# Patient Record
Sex: FEMALE | Race: WHITE | NOT HISPANIC OR LATINO | Employment: OTHER | URBAN - METROPOLITAN AREA
[De-identification: names, ages, dates, MRNs, and addresses within clinical notes are randomized per-mention and may not be internally consistent; named-entity substitution may affect disease eponyms.]

---

## 2023-03-26 ENCOUNTER — APPOINTMENT (OUTPATIENT)
Dept: GENERAL RADIOLOGY | Facility: HOSPITAL | Age: 68
End: 2023-03-26
Payer: MEDICARE

## 2023-03-26 ENCOUNTER — HOSPITAL ENCOUNTER (EMERGENCY)
Facility: HOSPITAL | Age: 68
Discharge: HOME | End: 2023-03-26
Admitting: EMERGENCY MEDICINE
Payer: MEDICARE

## 2023-03-26 VITALS
SYSTOLIC BLOOD PRESSURE: 146 MMHG | TEMPERATURE: 100.6 F | HEART RATE: 76 BPM | BODY MASS INDEX: 32.28 KG/M2 | OXYGEN SATURATION: 97 % | WEIGHT: 200.84 LBS | RESPIRATION RATE: 13 BRPM | DIASTOLIC BLOOD PRESSURE: 62 MMHG | HEIGHT: 66 IN

## 2023-03-26 DIAGNOSIS — M25.471 ANKLE SWELLING, RIGHT: Primary | ICD-10-CM

## 2023-03-26 DIAGNOSIS — M77.31 CALCANEAL SPUR OF FOOT, RIGHT: ICD-10-CM

## 2023-03-26 PROCEDURE — 99283 EMERGENCY DEPT VISIT LOW MDM: CPT

## 2023-03-26 PROCEDURE — 73630 X-RAY EXAM OF FOOT: CPT

## 2023-03-26 RX ORDER — IBUPROFEN 400 MG/1
800 TABLET ORAL ONCE
Status: COMPLETED | OUTPATIENT
Start: 2023-03-26 | End: 2023-03-26

## 2023-03-26 RX ADMIN — IBUPROFEN 800 MG: 400 TABLET ORAL at 17:12

## 2023-03-26 NOTE — ED PROVIDER NOTES
Time: 5:39 PM EDT  Date of encounter:  3/26/2023  Independent Historian/Clinical History and Information was obtained by:   Patient  Chief Complaint   Patient presents with   • Foot Swelling   • Foot Pain       History is limited by: N/A    History of Present Illness:  Patient is a 67 y.o. year old female who presents to the emergency department for evaluation of R foot pain, swelling since Tuesday.  Patient states she was moving and dropped a box on her right foot.  Patient states that she has a cold right now with symptoms of nasal congestion and loss of taste or smell.  She denies recent travel, anticoagulants, cough, shortness of breath or history of DVT.  Denies fever, chills, nausea or vomiting.    HPI    Patient Care Team  Primary Care Provider: Provider, No Known    Past Medical History:     No Known Allergies  Past Medical History:   Diagnosis Date   • Arthritis      Past Surgical History:   Procedure Laterality Date   • HIP BIPOLAR REPLACEMENT       History reviewed. No pertinent family history.    Home Medications:  Prior to Admission medications    Medication Sig Start Date End Date Taking? Authorizing Provider   aspirin 81 MG EC tablet Take 1 tablet by mouth Daily.    Emergency, Nurse Crispin RN   buPROPion XL (WELLBUTRIN XL) 150 MG 24 hr tablet Take 2 tablets by mouth Daily.    Emergency, Nurse Crispin RN   buPROPion XL (WELLBUTRIN XL) 150 MG 24 hr tablet Take 1 tablet by mouth Daily.    Emergency, Nurse Crispin RN   FLUoxetine (PROzac) 20 MG capsule Take 1 capsule by mouth Daily.    Emergency, Nurse MIGUEL Roa        Social History:   Social History     Tobacco Use   • Smoking status: Never     Passive exposure: Never   • Smokeless tobacco: Never   Vaping Use   • Vaping Use: Never used   Substance Use Topics   • Alcohol use: Not Currently   • Drug use: Never         Review of Systems:  Review of Systems   Constitutional: Negative.    HENT: Negative.    Eyes: Negative.    Respiratory: Negative.   "  Cardiovascular: Negative.    Gastrointestinal: Negative.    Endocrine: Negative.    Genitourinary: Negative.    Musculoskeletal: Positive for arthralgias and joint swelling.   Skin: Negative.  Negative for color change and rash.   Allergic/Immunologic: Negative.    Neurological: Negative.    Hematological: Negative.    Psychiatric/Behavioral: Negative.         Physical Exam:  /62 (BP Location: Left arm, Patient Position: Sitting)   Pulse 76   Temp (!) 100.6 °F (38.1 °C) (Oral)   Resp 13   Ht 167.6 cm (66\")   Wt 91.1 kg (200 lb 13.4 oz)   SpO2 97%   BMI 32.42 kg/m²     Physical Exam  Vitals and nursing note reviewed.   Constitutional:       General: She is not in acute distress.     Appearance: Normal appearance. She is normal weight. She is not ill-appearing, toxic-appearing or diaphoretic.   HENT:      Head: Normocephalic and atraumatic.      Nose: Nose normal.      Mouth/Throat:      Mouth: Mucous membranes are moist.   Eyes:      Extraocular Movements: Extraocular movements intact.      Pupils: Pupils are equal, round, and reactive to light.   Cardiovascular:      Rate and Rhythm: Normal rate and regular rhythm.      Heart sounds: Normal heart sounds.   Pulmonary:      Effort: Pulmonary effort is normal.      Breath sounds: Normal breath sounds.   Musculoskeletal:         General: Swelling, tenderness and signs of injury present.      Cervical back: Normal range of motion and neck supple.      Right foot: Decreased range of motion. Normal capillary refill. Swelling and tenderness present. Normal pulse.        Feet:       Comments: Homans sign negative    Skin:     General: Skin is warm and dry.      Capillary Refill: Capillary refill takes 2 to 3 seconds.      Findings: No bruising or erythema.   Neurological:      General: No focal deficit present.      Mental Status: She is alert and oriented to person, place, and time.   Psychiatric:         Mood and Affect: Mood normal.         Behavior: " Behavior normal.                  Procedures:  Procedures      Medical Decision Making:      Comorbidities that affect care:    None    External Notes reviewed:    None      The following orders were placed and all results were independently analyzed by me:  Orders Placed This Encounter   Procedures   • [facility] 08.  CAM Boot   • XR Foot 3+ View Right   • Ace wrap to R foot.  [facility]       Medications Given in the Emergency Department:  Medications   ibuprofen (ADVIL,MOTRIN) tablet 800 mg (800 mg Oral Given 3/26/23 1712)        ED Course:    The patient was initially evaluated in the triage area where orders were placed. The patient was later dispositioned by Jerica Huerta PA-C.      The patient was advised to stay for completion of workup which includes but is not limited to communication of labs and radiological results, reassessment and plan. The patient was advised that leaving prior to disposition by a provider could result in critical findings that are not communicated to the patient.     ED Course as of 03/26/23 1825   Sun Mar 26, 2023   1657 Dropped a box on R foot Tuesday. No bloodthinners, no recent travel, denies SOB/cough [AJ]   1742 Patient dec;line swabs for covid or flu and states she's feeling better  [AJ]   1746 There are degenerative changes involving the head of the 1st metatarsal.  There is no acute   fracture.  There is a plantar calcaneal spur.  There is some spurring along the dorsum of the   midfoot.    [AJ]      ED Course User Index  [AJ] Jerica Huerta PA-C       Labs:    Lab Results (last 24 hours)     ** No results found for the last 24 hours. **           Imaging:    XR Foot 3+ View Right    Result Date: 3/26/2023  PROCEDURE: XR FOOT 3+ VW RIGHT  COMPARISON: None  INDICATIONS: DROPPED BOX OF BOOKS ON RIGHT FOOT Wednesday COMPLAINS OF PAIN  FINDINGS:  There are degenerative changes involving the head of the 1st metatarsal.  There is no acute  fracture.  There is a plantar calcaneal spur.  There is some spurring along the dorsum of the midfoot.        1. Evidence for underlying degenerative change. 2. Plantar calcaneal spur.      ANGELO HERNANDEZ MD       Electronically Signed and Approved By: ANGELO HERNANDEZ MD on 3/26/2023 at 17:18                 Differential Diagnosis and Discussion:      Extremity Pain: Differential diagnosis includes but is not limited to soft tissue sprain, tendonitis, tendon injury, dislocation, fracture, deep vein thrombosis, arterial insufficiency, osteoarthritis, bursitis, and ligamentous damage.  Joint Pain: Differential diagnosis includes but is not limited to polyarticular arthritis, gout, tendinitis, hemarthrosis, septic arthritis, rheumatoid arthritis, bursitis, degenerative joint disease, joint effusion, autoimmune disorder, trauma, and occult neoplasm.    All X-rays were independently reviewed by me.    MDM     Patient Care Considerations:    DUPLEX ULTRASOUND: I considered ordering a duplex ultrasound, however the patient is low risk for dvt and has no signs of arterial occlusion.      Consultants/Shared Management Plan:    None    Social Determinants of Health:    Patient is independent, reliable, and has access to care.       Disposition and Care Coordination:    Discharged: The patient is suitable and stable for discharge with no need for consideration of observation or admission.      I have explained the patient´s condition, diagnoses and treatment plan based on the information available to me at this time. I have answered questions and addressed any concerns. The patient has a good  understanding of the patient´s diagnosis, condition, and treatment plan as can be expected at this point. The vital signs have been stable. The patient´s condition is stable and appropriate for discharge from the emergency department.      The patient will pursue further outpatient evaluation with the primary care physician or other  designated or consulting physician as outlined in the discharge instructions. They are agreeable to this plan of care and follow-up instructions have been explained in detail. The patient has received these instructions in written format and have expressed an understanding of the discharge instructions. The patient is aware that any significant change in condition or worsening of symptoms should prompt an immediate return to this or the closest emergency department or call to 911.  I have explained discharge medications and the need for follow up with the patient/caretakers. This was also printed in the discharge instructions. Patient was discharged with the following medications and follow up:      Medication List      No changes were made to your prescriptions during this visit.      Provider, No Known  [facility] [id number]  [phone]    In 1 week  If symptoms worsen       Final diagnoses:   Ankle swelling, right (lateral malleolus)   Calcaneal spur of foot, right        ED Disposition     ED Disposition   Discharge    Condition   Stable    Comment   --             This medical record created using voice recognition software.           Jerica Huerta PA-C  03/26/23 2120

## 2023-03-26 NOTE — DISCHARGE INSTRUCTIONS
Please wear Ace wrap, ice the right ankle and top of the foot for 15 to 20 minutes multiple times throughout the day and elevate  Please follow up with your PCP  If any worsening swelling, redness or worsening pain please return to the ED

## 2023-05-04 ENCOUNTER — OFFICE VISIT (OUTPATIENT)
Dept: FAMILY MEDICINE CLINIC | Facility: CLINIC | Age: 68
End: 2023-05-04
Payer: MEDICARE

## 2023-05-04 VITALS
DIASTOLIC BLOOD PRESSURE: 68 MMHG | WEIGHT: 203.5 LBS | HEIGHT: 66 IN | SYSTOLIC BLOOD PRESSURE: 139 MMHG | BODY MASS INDEX: 32.71 KG/M2 | TEMPERATURE: 98.4 F | HEART RATE: 97 BPM | OXYGEN SATURATION: 97 %

## 2023-05-04 DIAGNOSIS — S46.012A TRAUMATIC TEAR OF LEFT ROTATOR CUFF, UNSPECIFIED TEAR EXTENT, INITIAL ENCOUNTER: ICD-10-CM

## 2023-05-04 DIAGNOSIS — I25.10 CORONARY ARTERY DISEASE INVOLVING NATIVE CORONARY ARTERY OF NATIVE HEART WITHOUT ANGINA PECTORIS: Primary | Chronic | ICD-10-CM

## 2023-05-04 DIAGNOSIS — M10.9 GOUT, UNSPECIFIED CAUSE, UNSPECIFIED CHRONICITY, UNSPECIFIED SITE: ICD-10-CM

## 2023-05-04 DIAGNOSIS — F33.1 MODERATE EPISODE OF RECURRENT MAJOR DEPRESSIVE DISORDER: Chronic | ICD-10-CM

## 2023-05-04 DIAGNOSIS — Z12.11 ENCOUNTER FOR SCREENING FOR MALIGNANT NEOPLASM OF COLON: ICD-10-CM

## 2023-05-04 DIAGNOSIS — I10 PRIMARY HYPERTENSION: Chronic | ICD-10-CM

## 2023-05-04 DIAGNOSIS — E66.09 CLASS 1 OBESITY DUE TO EXCESS CALORIES WITH SERIOUS COMORBIDITY AND BODY MASS INDEX (BMI) OF 32.0 TO 32.9 IN ADULT: Chronic | ICD-10-CM

## 2023-05-04 DIAGNOSIS — E55.9 VITAMIN D DEFICIENCY: Chronic | ICD-10-CM

## 2023-05-04 DIAGNOSIS — Z00.00 ANNUAL PHYSICAL EXAM: ICD-10-CM

## 2023-05-04 DIAGNOSIS — Z78.0 POSTMENOPAUSE: ICD-10-CM

## 2023-05-04 DIAGNOSIS — E78.5 HYPERLIPIDEMIA, UNSPECIFIED HYPERLIPIDEMIA TYPE: ICD-10-CM

## 2023-05-04 DIAGNOSIS — Z12.31 ENCOUNTER FOR SCREENING MAMMOGRAM FOR MALIGNANT NEOPLASM OF BREAST: ICD-10-CM

## 2023-05-04 PROBLEM — E66.811 CLASS 1 OBESITY DUE TO EXCESS CALORIES WITH SERIOUS COMORBIDITY AND BODY MASS INDEX (BMI) OF 32.0 TO 32.9 IN ADULT: Chronic | Status: ACTIVE | Noted: 2023-05-04

## 2023-05-04 RX ORDER — METOPROLOL TARTRATE 50 MG/1
TABLET, FILM COATED ORAL
COMMUNITY
Start: 2023-04-13

## 2023-05-04 RX ORDER — IBUPROFEN 400 MG/1
400 TABLET ORAL EVERY 6 HOURS PRN
COMMUNITY

## 2023-05-04 RX ORDER — FLUOXETINE HYDROCHLORIDE 40 MG/1
CAPSULE ORAL
COMMUNITY
Start: 2023-04-23

## 2023-05-04 NOTE — ASSESSMENT & PLAN NOTE
Patient's (Body mass index is 32.85 kg/m².) indicates that they are obese (BMI >30) with health conditions that include hypertension . Weight is newly identified. BMI  is above average; BMI management plan is completed. We discussed low calorie, low carb based diet program, portion control and increasing exercise.

## 2023-05-04 NOTE — PROGRESS NOTES
"Chief Complaint  Hypertension, Shoulder Pain (Left ), and referral for cardiologist     Subjective        Elysia Babcock presents to [facility]  History of Present Illness  She is here today to establish relations and new patient.  She is  and lives with her son and daughter-in-law.  She spent quite a bit of her adult life in [place].  She was a  and she is now retired.  She has obesity, coronary artery disease status post stent x1 in 2017, major depression, hypertension, hyperlipidemia and vitamin D deficiency. Her dad had cad.      She is wanting to lose wt and has lost 50 lbs.     The patient has no other complaints today and denies chest pain, shortness of breath, weakness, numbness, nausea, vomiting, diarrhea, dizziness or syncopal event.        Objective   Vital Signs:  /68 (Patient Position: Sitting)   Pulse 97   Temp 98.4 °F (36.9 °C)   Ht 167.6 cm (66\")   Wt 92.3 kg (203 lb 8 oz)   SpO2 97%   BMI 32.85 kg/m²   Estimated body mass index is 32.85 kg/m² as calculated from the following:    Height as of this encounter: 167.6 cm (66\").    Weight as of this encounter: 92.3 kg (203 lb 8 oz).             Physical Exam  Vitals reviewed.   Constitutional:       Appearance: Normal appearance. She is well-developed. She is obese.   HENT:      Head: Normocephalic and atraumatic.      Right Ear: External ear normal.      Left Ear: External ear normal.      Mouth/Throat:      Pharynx: No oropharyngeal exudate.   Eyes:      Conjunctiva/sclera: Conjunctivae normal.      Pupils: Pupils are equal, round, and reactive to light.   Neck:      Vascular: No carotid bruit.   Cardiovascular:      Rate and Rhythm: Normal rate and regular rhythm.      Heart sounds: No murmur heard.    No friction rub. No gallop.   Pulmonary:      Effort: Pulmonary effort is normal.      Breath sounds: Normal breath sounds. No wheezing or rhonchi.   Abdominal:      General: There is no " distension.   Skin:     General: Skin is warm and dry.   Neurological:      Mental Status: She is alert and oriented to person, place, and time.      Cranial Nerves: No cranial nerve deficit.      Motor: No weakness.   Psychiatric:         Mood and Affect: Mood and affect normal.         Behavior: Behavior normal.         Thought Content: Thought content normal.         Judgment: Judgment normal.        Result Review :                                 Assessment and Plan   Diagnoses and all orders for this visit:    1. Coronary artery disease involving native coronary artery of native heart without angina pectoris (Primary)  Comments:  She was given an order for a lipid level and a cardilogy referral.   Orders:  -     Ambulatory Referral to Cardiology    2. Encounter for screening mammogram for malignant neoplasm of breast  -     Mammo Screening Digital Tomosynthesis Bilateral With CAD; Future    3. Postmenopause  -     DEXA Bone Density Axial; Future    4. Encounter for screening for malignant neoplasm of colon  -     Ambulatory Referral For Screening Colonoscopy    5. Class 1 obesity due to excess calories with serious comorbidity and body mass index (BMI) of 32.0 to 32.9 in adult  Assessment & Plan:  Patient's (Body mass index is 32.85 kg/m².) indicates that they are obese (BMI >30) with health conditions that include hypertension . Weight is newly identified. BMI  is above average; BMI management plan is completed. We discussed low calorie, low carb based diet program, portion control and increasing exercise.       6. Vitamin D deficiency  -     Vitamin D 25 hydroxy; Future    7. Moderate episode of recurrent major depressive disorder  Assessment & Plan:  Patient's depression is recurrent and is moderate without psychosis. Their depression is currently active and the condition is improving with treatment. This will be reassessed at the next regular appointment. F/U as described:patient will continue current  medication therapy.      8. Primary hypertension  Assessment & Plan:  Hypertension is improving with treatment.  Continue current treatment regimen.  Dietary sodium restriction.  Weight loss.  Blood pressure will be reassessed at the next regular appointment.    Orders:  -     Ambulatory Referral to Cardiology    9. Annual physical exam  -     Comprehensive Metabolic Panel; Future  -     CBC & Differential; Future  -     TSH+Free T4; Future    10. Hyperlipidemia, unspecified hyperlipidemia type  -     Lipid Panel; Future  -     Ambulatory Referral to Cardiology    11. Gout, unspecified cause, unspecified chronicity, unspecified site  -     Uric acid; Future    12. Traumatic tear of left rotator cuff, unspecified tear extent, initial encounter  -     Ambulatory Referral to Orthopedic Surgery           Follow Up   Return in about 6 months (around 11/4/2023).  Patient was given instructions and counseling regarding her condition or for health maintenance advice. Please see specific information pulled into the AVS if appropriate.

## 2023-05-10 ENCOUNTER — OFFICE VISIT (OUTPATIENT)
Dept: ORTHOPEDIC SURGERY | Facility: CLINIC | Age: 68
End: 2023-05-10
Payer: MEDICARE

## 2023-05-10 VITALS — WEIGHT: 208 LBS | BODY MASS INDEX: 33.43 KG/M2 | HEART RATE: 93 BPM | HEIGHT: 66 IN | OXYGEN SATURATION: 95 %

## 2023-05-10 DIAGNOSIS — M25.512 LEFT SHOULDER PAIN, UNSPECIFIED CHRONICITY: Primary | ICD-10-CM

## 2023-05-10 DIAGNOSIS — M12.812 ROTATOR CUFF ARTHROPATHY, LEFT: ICD-10-CM

## 2023-05-10 RX ORDER — LIDOCAINE HYDROCHLORIDE 10 MG/ML
5 INJECTION, SOLUTION EPIDURAL; INFILTRATION; INTRACAUDAL; PERINEURAL
Status: COMPLETED | OUTPATIENT
Start: 2023-05-10 | End: 2023-05-10

## 2023-05-10 RX ORDER — TRIAMCINOLONE ACETONIDE 40 MG/ML
40 INJECTION, SUSPENSION INTRA-ARTICULAR; INTRAMUSCULAR
Status: COMPLETED | OUTPATIENT
Start: 2023-05-10 | End: 2023-05-10

## 2023-05-10 RX ADMIN — TRIAMCINOLONE ACETONIDE 40 MG: 40 INJECTION, SUSPENSION INTRA-ARTICULAR; INTRAMUSCULAR at 09:44

## 2023-05-10 RX ADMIN — LIDOCAINE HYDROCHLORIDE 5 ML: 10 INJECTION, SOLUTION EPIDURAL; INFILTRATION; INTRACAUDAL; PERINEURAL at 09:44

## 2023-05-10 NOTE — PROGRESS NOTES
"Chief Complaint  Initial Evaluation and Pain of the Left Shoulder    Subjective          Elysia Babcock presents to [facility] for   History of Present Illness    The patient presents here today for evaluation of the left shoulder. She reports left shoulder pain that comes and goes. She has had these symptoms for several years and was told 2 years ago she had a torn rotator cuff. She has had a previous surgery to her shoulder 20 years ago. She has no other complaints.     No Known Allergies     Social History     Socioeconomic History   • Marital status:    Tobacco Use   • Smoking status: Never     Passive exposure: Never   • Smokeless tobacco: Never   • Tobacco comments:     No second hand smoke exposure    Vaping Use   • Vaping Use: Never used   Substance and Sexual Activity   • Alcohol use: Yes     Alcohol/week: 3.0 standard drinks     Types: 3 Glasses of wine per week   • Drug use: Never   • Sexual activity: Not Currently     Partners: Male        I reviewed the patient's chief complaint, history of present illness, review of systems, past medical history, surgical history, family history, social history, medications, and allergy list.     REVIEW OF SYSTEMS    Constitutional: Denies fevers, chills, weight loss  Cardiovascular: Denies chest pain, shortness of breath  Skin: Denies rashes, acute skin changes  Neurologic: Denies headache, loss of consciousness  MSK: Left shoulder pain      Objective   Vital Signs:   Pulse 93   Ht 167.6 cm (66\")   Wt 94.3 kg (208 lb)   SpO2 95%   BMI 33.57 kg/m²     Body mass index is 33.57 kg/m².    Physical Exam    General: Alert. No acute distress.   Left shoulder- Sensation to light touch median, radial, ulnar nerve. Positive AIN, PIN, ulnar nerve motor function. Positive pulses. Tender to the biceps. Forward elevation 180. External Rotation 45. Internal rotation mid lumbar with pain. 3/5 supraspinatus strength with pain. 4/5 infraspinatus, " 5/5 subscapularis. Neurovascularly intact. Positive impingement signs. Pain with speeds.     Large Joint Arthrocentesis  Date/Time: 5/10/2023 9:44 AM  Consent given by: patient  Site marked: site marked  Timeout: Immediately prior to procedure a time out was called to verify the correct patient, procedure, equipment, support staff and site/side marked as required   Supporting Documentation  Indications: pain   Procedure Details  Location: shoulder (left) -   Needle gauge: 21g.  Medications administered: 5 mL lidocaine PF 1% 1 %; 40 mg triamcinolone acetonide 40 MG/ML  Patient tolerance: patient tolerated the procedure well with no immediate complications          Imaging Results (Most Recent)     Procedure Component Value Units Date/Time    XR Shoulder 2+ View Left [[id number]] Resulted: 05/10/23 1057     Updated: 05/10/23 1058    Narrative:      Indications: Left shoulder pain    Views: AP, Grashey, Scap Y, axillary left shoulder    Findings: Glenohumeral joint reduced.  The humeral head appears slightly   high riding relative to the glenoid.  Mild glenohumeral arthritic changes   are seen.  Mild to moderate AC joint arthrosis.  No fractures.    Comparative Data: No comparative data available                     Assessment and Plan        XR Shoulder 2+ View Left    Result Date: 5/10/2023  Narrative: Indications: Left shoulder pain Views: AP, Grashey, Scap Y, axillary left shoulder Findings: Glenohumeral joint reduced.  The humeral head appears slightly high riding relative to the glenoid.  Mild glenohumeral arthritic changes are seen.  Mild to moderate AC joint arthrosis.  No fractures. Comparative Data: No comparative data available        Diagnoses and all orders for this visit:    1. Left shoulder pain, unspecified chronicity (Primary)  -     XR Shoulder 2+ View Left    2. Rotator cuff arthropathy, left    Other orders  -     Large Joint Arthrocentesis        Discussed the treatment plan with the patient.  I  reviewed the x-rays that were obtained today with the patient. Plan for conservative treatment at this time. Home exercises given today. Discussed the risks and benefits of a left shoulder steroid injection. The patient expressed understanding and wished to proceed. She tolerated the injection well.     Call or return if worsening symptoms.    Scribed for Tadeo Tsai MD by Amelia Valenzuela  05/10/2023   09:16 EDT         Follow Up       6 weeks    Patient was given instructions and counseling regarding her condition or for health maintenance advice. Please see specific information pulled into the AVS if appropriate.       I have personally performed the services described in this document as scribed by the above individual and it is both accurate and complete.     Tadeo Tsai MD  05/10/23  16:00 EDT

## 2023-05-30 ENCOUNTER — PATIENT ROUNDING (BHMG ONLY) (OUTPATIENT)
Dept: FAMILY MEDICINE CLINIC | Facility: CLINIC | Age: 68
End: 2023-05-30

## 2023-05-30 NOTE — PROGRESS NOTES
A My-Chart message has been sent to the patient for PATIENT ROUNDING with [facility].

## 2023-06-05 ENCOUNTER — LAB (OUTPATIENT)
Dept: LAB | Facility: HOSPITAL | Age: 68
End: 2023-06-05
Payer: MEDICARE

## 2023-06-05 DIAGNOSIS — E78.5 HYPERLIPIDEMIA, UNSPECIFIED HYPERLIPIDEMIA TYPE: ICD-10-CM

## 2023-06-05 DIAGNOSIS — Z00.00 ANNUAL PHYSICAL EXAM: ICD-10-CM

## 2023-06-05 DIAGNOSIS — M10.9 GOUT, UNSPECIFIED CAUSE, UNSPECIFIED CHRONICITY, UNSPECIFIED SITE: ICD-10-CM

## 2023-06-05 DIAGNOSIS — E55.9 VITAMIN D DEFICIENCY: Chronic | ICD-10-CM

## 2023-06-05 LAB
25(OH)D3 SERPL-MCNC: 70.3 NG/ML (ref 30–100)
ALBUMIN SERPL-MCNC: 4.4 G/DL (ref 3.5–5.2)
ALBUMIN/GLOB SERPL: 1.9 G/DL
ALP SERPL-CCNC: 61 U/L (ref 39–117)
ALT SERPL W P-5'-P-CCNC: 18 U/L (ref 1–33)
ANION GAP SERPL CALCULATED.3IONS-SCNC: 7 MMOL/L (ref 5–15)
AST SERPL-CCNC: 22 U/L (ref 1–32)
BASOPHILS # BLD AUTO: 0.03 10*3/MM3 (ref 0–0.2)
BASOPHILS NFR BLD AUTO: 0.4 % (ref 0–1.5)
BILIRUB SERPL-MCNC: 0.6 MG/DL (ref 0–1.2)
BUN SERPL-MCNC: 22 MG/DL (ref 8–23)
BUN/CREAT SERPL: 24.7 (ref 7–25)
CALCIUM SPEC-SCNC: 9.4 MG/DL (ref 8.6–10.5)
CHLORIDE SERPL-SCNC: 104 MMOL/L (ref 98–107)
CHOLEST SERPL-MCNC: 230 MG/DL (ref 0–200)
CO2 SERPL-SCNC: 28 MMOL/L (ref 22–29)
CREAT SERPL-MCNC: 0.89 MG/DL (ref 0.57–1)
DEPRECATED RDW RBC AUTO: 45.2 FL (ref 37–54)
EGFRCR SERPLBLD CKD-EPI 2021: 70.7 ML/MIN/1.73
EOSINOPHIL # BLD AUTO: 0.14 10*3/MM3 (ref 0–0.4)
EOSINOPHIL NFR BLD AUTO: 1.9 % (ref 0.3–6.2)
ERYTHROCYTE [DISTWIDTH] IN BLOOD BY AUTOMATED COUNT: 13.5 % (ref 12.3–15.4)
GLOBULIN UR ELPH-MCNC: 2.3 GM/DL
GLUCOSE SERPL-MCNC: 95 MG/DL (ref 65–99)
HCT VFR BLD AUTO: 41.6 % (ref 34–46.6)
HDLC SERPL-MCNC: 69 MG/DL (ref 40–60)
HGB BLD-MCNC: 14 G/DL (ref 12–15.9)
IMM GRANULOCYTES # BLD AUTO: 0.02 10*3/MM3 (ref 0–0.05)
IMM GRANULOCYTES NFR BLD AUTO: 0.3 % (ref 0–0.5)
LDLC SERPL CALC-MCNC: 150 MG/DL (ref 0–100)
LDLC/HDLC SERPL: 2.15 {RATIO}
LYMPHOCYTES # BLD AUTO: 1.54 10*3/MM3 (ref 0.7–3.1)
LYMPHOCYTES NFR BLD AUTO: 20.4 % (ref 19.6–45.3)
MCH RBC QN AUTO: 31.4 PG (ref 26.6–33)
MCHC RBC AUTO-ENTMCNC: 33.7 G/DL (ref 31.5–35.7)
MCV RBC AUTO: 93.3 FL (ref 79–97)
MONOCYTES # BLD AUTO: 0.87 10*3/MM3 (ref 0.1–0.9)
MONOCYTES NFR BLD AUTO: 11.5 % (ref 5–12)
NEUTROPHILS NFR BLD AUTO: 4.95 10*3/MM3 (ref 1.7–7)
NEUTROPHILS NFR BLD AUTO: 65.5 % (ref 42.7–76)
NRBC BLD AUTO-RTO: 0 /100 WBC (ref 0–0.2)
PLATELET # BLD AUTO: 156 10*3/MM3 (ref 140–450)
PMV BLD AUTO: 12.1 FL (ref 6–12)
POTASSIUM SERPL-SCNC: 4.7 MMOL/L (ref 3.5–5.2)
PROT SERPL-MCNC: 6.7 G/DL (ref 6–8.5)
RBC # BLD AUTO: 4.46 10*6/MM3 (ref 3.77–5.28)
SODIUM SERPL-SCNC: 139 MMOL/L (ref 136–145)
T4 FREE SERPL-MCNC: 1.18 NG/DL (ref 0.93–1.7)
TRIGL SERPL-MCNC: 62 MG/DL (ref 0–150)
TSH SERPL DL<=0.05 MIU/L-ACNC: 2.25 UIU/ML (ref 0.27–4.2)
URATE SERPL-MCNC: 6.1 MG/DL (ref 2.4–5.7)
VLDLC SERPL-MCNC: 11 MG/DL (ref 5–40)
WBC NRBC COR # BLD: 7.55 10*3/MM3 (ref 3.4–10.8)

## 2023-06-05 PROCEDURE — 36415 COLL VENOUS BLD VENIPUNCTURE: CPT

## 2023-06-05 PROCEDURE — 84550 ASSAY OF BLOOD/URIC ACID: CPT

## 2023-06-05 PROCEDURE — 84439 ASSAY OF FREE THYROXINE: CPT

## 2023-06-05 PROCEDURE — 80053 COMPREHEN METABOLIC PANEL: CPT

## 2023-06-05 PROCEDURE — 85025 COMPLETE CBC W/AUTO DIFF WBC: CPT

## 2023-06-05 PROCEDURE — 82306 VITAMIN D 25 HYDROXY: CPT

## 2023-06-05 PROCEDURE — 80061 LIPID PANEL: CPT

## 2023-06-05 PROCEDURE — 84443 ASSAY THYROID STIM HORMONE: CPT

## 2023-08-07 ENCOUNTER — HOSPITAL ENCOUNTER (OUTPATIENT)
Dept: BONE DENSITY | Facility: HOSPITAL | Age: 68
Discharge: HOME | End: 2023-08-07
Payer: MEDICARE

## 2023-08-07 ENCOUNTER — HOSPITAL ENCOUNTER (OUTPATIENT)
Dept: MAMMOGRAPHY | Facility: HOSPITAL | Age: 68
Discharge: HOME | End: 2023-08-07
Payer: MEDICARE

## 2023-08-07 DIAGNOSIS — Z12.31 ENCOUNTER FOR SCREENING MAMMOGRAM FOR MALIGNANT NEOPLASM OF BREAST: ICD-10-CM

## 2023-08-07 DIAGNOSIS — Z78.0 POSTMENOPAUSE: ICD-10-CM

## 2023-08-07 PROCEDURE — 77080 DXA BONE DENSITY AXIAL: CPT

## 2023-08-07 PROCEDURE — 77063 BREAST TOMOSYNTHESIS BI: CPT

## 2023-08-07 PROCEDURE — 77067 SCR MAMMO BI INCL CAD: CPT

## 2023-08-20 RX ORDER — ALENDRONATE SODIUM 70 MG/1
70 TABLET ORAL
Qty: 12 TABLET | Refills: 1 | Status: SHIPPED | OUTPATIENT
Start: 2023-08-20

## 2023-08-27 PROBLEM — E78.2 MIXED HYPERLIPIDEMIA: Status: ACTIVE | Noted: 2023-05-04

## 2023-08-27 NOTE — PROGRESS NOTES
CARDIOLOGY INITIAL CONSULT       Chief Complaint  Establish Care, Shortness of Breath, and Coronary Artery Disease    Reason for consultation  Coronary artery disease, hypertension, hyperlipidemia    Subjective            Elysia Babcock presents to [facility] CARDIOLOGY  History of Present Illness      Ms Babcock is here to establish cardiac care.  She moved from [place] last year.  She has coronary artery disease and underwent angioplasty and stent placement to LAD artery in 2015.  She had repeat cardiac catheterization done in 2019 which showed patent stents.  She is also being monitored for hypertrophic cardiomyopathy with possible S.A.M. and the minimal gradient in the LV outflow tract.    Today patient denies any new complaints.  She is feeling short of breath for the past few days which is mild.  She had no recent episodes of chest pain, palpitations, dizziness or syncopal episodes.  She ran out of her prescription for simvastatin, hence not taking any statins for the past 2 months.  He was previously following up with Dr. Mark Pereyra at [place].      Past History:    Coronary artery disease : Status post angioplasty and stent placement to mid LAD artery (2.75x26 resolute) on 6/26/2015 at [facility] [place] [facility] in [place].  LCx and RCA had no significant lesions.  Repeat cardiac cath done on 12/3/2019 showed patent stent.  There is 50% stenosis of the ostium of the first diagonal branch.  SPECT study done on 5/17/2021 did not show any ischemia    Hypertrophic cardiomyopathy.  Possible S.A.M. and subaortic obstruction with elevated velocity LVOT per echocardiogram done on 7/21/2021.  12 mm mean gradient    Chronic diastolic heart failure    Essential hypertension  Next hyperlipidemia    Medical History:  Past Medical History:   Diagnosis Date    Allergic     Arthritis     Coronary artery disease 2019    Depression 1990    Heart murmur     Hyperlipidemia     Hypertension     Obesity      Visual impairment        Surgical History: has a past surgical history that includes Hip Bipolar; Cholecystectomy; Coronary stent placement;  section; Joint replacement (); Tonsillectomy (); and Colonoscopy.     Family History: family history includes Asthma in her mother; Depression in her mother; Heart disease in her father; Hyperlipidemia in her mother.     Social History: reports that she has never smoked. She has never been exposed to tobacco smoke. She has never used smokeless tobacco. She reports current alcohol use of about 3.0 standard drinks per week. She reports that she does not use drugs.    Allergies: Patient has no known allergies.    Current Outpatient Medications on File Prior to Visit   Medication Sig    alendronate (Fosamax) 70 MG tablet Take 1 tablet by mouth Every 7 (Seven) Days.    aspirin 81 MG EC tablet Take 1 tablet by mouth Daily.    B Complex Vitamins (VITAMIN B COMPLEX PO) Take  by mouth.    FLUoxetine (PROzac) 40 MG capsule     ibuprofen (ADVIL,MOTRIN) 400 MG tablet Take 1 tablet by mouth Every 6 (Six) Hours As Needed for Mild Pain.    vitamin D3 125 MCG (5000 UT) capsule capsule Take 1 capsule by mouth Daily.    metoprolol tartrate (LOPRESSOR) 50 MG tablet Take 1 tablet by mouth 2 (Two) Times a Day.    buPROPion XL (WELLBUTRIN XL) 150 MG 24 hr tablet Take 2 tablets by mouth Daily. (Patient not taking: Reported on 2023)         Review of Systems   Constitutional:  Negative for fatigue, unexpected weight gain and unexpected weight loss.   Eyes:  Negative for double vision.   Respiratory:  Positive for shortness of breath. Negative for cough and wheezing.    Cardiovascular:  Negative for chest pain, palpitations and leg swelling.   Gastrointestinal:  Negative for abdominal pain, nausea and vomiting.   Endocrine: Negative for cold intolerance, heat intolerance, polydipsia and polyuria.   Musculoskeletal:  Negative for arthralgias and back pain.   Skin:  Negative for  "color change.   Neurological:  Negative for dizziness, syncope, weakness and headache.   Hematological:  Does not bruise/bleed easily.      Objective     /65 (BP Location: Right arm, Patient Position: Sitting, Cuff Size: Large Adult)   Pulse 65   Ht 167.6 cm (66\")   Wt 91.3 kg (201 lb 3.2 oz)   BMI 32.47 kg/mý       Physical Exam  Constitutional:       General: She is awake. She is not in acute distress.     Appearance: Normal appearance.   Eyes:      Extraocular Movements: Extraocular movements intact.      Pupils: Pupils are equal, round, and reactive to light.   Neck:      Thyroid: No thyromegaly.      Vascular: No carotid bruit or JVD.   Cardiovascular:      Rate and Rhythm: Normal rate and regular rhythm.      Chest Wall: PMI is not displaced.      Heart sounds: Normal heart sounds, S1 normal and S2 normal. No murmur heard.    No friction rub. No gallop. No S3 or S4 sounds.      Comments: 3/6 systolic murmur heard at the left upper sternal border.  Pulmonary:      Effort: Pulmonary effort is normal. No respiratory distress.      Breath sounds: Normal breath sounds. No wheezing, rhonchi or rales.   Abdominal:      General: Bowel sounds are normal.      Palpations: Abdomen is soft.      Tenderness: There is no abdominal tenderness.   Musculoskeletal:      Cervical back: Neck supple.      Right lower leg: No edema.      Left lower leg: No edema.   Skin:     Nails: There is no clubbing.   Neurological:      General: No focal deficit present.      Mental Status: She is alert and oriented to person, place, and time.         Result Review :     The following data was reviewed by: Matthew Vazquez MD on 08/28/2023:    CMP          6/5/2023    09:35   CMP   Glucose 95    BUN 22    Creatinine 0.89    EGFR 70.7    Sodium 139    Potassium 4.7    Chloride 104    Calcium 9.4    Total Protein 6.7    Albumin 4.4    Globulin 2.3    Total Bilirubin 0.6    Alkaline Phosphatase 61    AST (SGOT) 22    ALT (SGPT) 18  "   Albumin/Globulin Ratio 1.9    BUN/Creatinine Ratio 24.7    Anion Gap 7.0      CBC          6/5/2023    09:35   CBC   WBC 7.55    RBC 4.46    Hemoglobin 14.0    Hematocrit 41.6    MCV 93.3    MCH 31.4    MCHC 33.7    RDW 13.5    Platelets 156      TSH          6/5/2023    09:35   TSH   TSH 2.250      Lipid Panel          6/5/2023    09:35   Lipid Panel   Total Cholesterol 230    Triglycerides 62    HDL Cholesterol 69    VLDL Cholesterol 11    LDL Cholesterol  150    LDL/HDL Ratio 2.15         Data reviewed: Cardiology studies              ECG 12 Lead    Date/Time: 8/28/2023 4:32 PM  Performed by: Matthew Vazquez MD  Authorized by: Matthew Vazquez MD   Comparison: compared with previous ECG from 12/23/2019  Similar to previous ECG  Rhythm: sinus rhythm  Rate: normal  QRS axis: normal  Other findings: non-specific ST-T wave changes and left ventricular hypertrophy       Echocardiogram done on 7/21/2021 showed    Mild LVH, ejection fraction 55 to 60%.  Mildly enlarged left atrium.  Elevated velocity of LVOT and aorta due to subaortic obstruction.  Possible CARLITOS.  Grade 1 diastolic dysfunction, trivial pericardial effusion       Assessment and Plan        Diagnoses and all orders for this visit:    1. Coronary artery disease involving native coronary artery of native heart without angina pectoris (Primary)  Assessment & Plan:  She is currently stable with no chest pain.  She does report minimal shortness of breath.  We will do an echocardiogram to reevaluate LV function and valvular abnormalities.  Continue aspirin and beta-blockers.  Will reinitiate statins.    Orders:  -     Adult Transthoracic Echo Complete W/ Cont if Necessary Per Protocol; Future  -     ECG 12 Lead    2. Mixed hyperlipidemia  Assessment & Plan:  Labs done in June showed LDL of 150 which is significantly above goal.  She is not on a statin at this time, ran out of prescription for simvastatin 2 months back.  We will initiate atorvastatin 20 mg  nightly.  Repeat lipid panel in 3 months and adjust the dose if needed.    Orders:  -     atorvastatin (LIPITOR) 20 MG tablet; Take 1 tablet by mouth Daily.  Dispense: 90 tablet; Refill: 3  -     Comprehensive Metabolic Panel; Future  -     Lipid Panel; Future    3. Primary hypertension  Assessment & Plan:  Blood pressure is reasonably well controlled in the office today.  We will continue metoprolol tartrate 50 mg twice daily.  Her medication list also includes losartan, however not sure whether she is still taking it or not.  Recent labs showed stable control lites and renal function.    Orders:  -     metoprolol tartrate (LOPRESSOR) 50 MG tablet; Take 1 tablet by mouth 2 (Two) Times a Day.  Dispense: 180 tablet; Refill: 1    4. Hypertrophic cardiomyopathy  Assessment & Plan:  Documented multiple echocardiograms done at previous cardiologist office.  Loud systolic murmur audible.  Echocardiogram will be repeated to evaluate the LVOT gradient.  Continue metoprolol for now.            I spent 37 minutes caring for Elysia on this date of service. This time includes time spent by me in the following activities:reviewing tests, obtaining and/or reviewing a separately obtained history, performing a medically appropriate examination and/or evaluation , ordering medications, tests, or procedures, and documenting information in the medical record    Follow Up     Return in about 6 months (around 2/28/2024) for Next scheduled follow up.    Patient was given instructions and counseling regarding her condition or for health maintenance advice. Please see specific information pulled into the AVS if appropriate.

## 2023-08-27 NOTE — ASSESSMENT & PLAN NOTE
Labs done in June showed LDL of 150 which is significantly above goal.  She is not on a statin at this time, ran out of prescription for simvastatin 2 months back.  We will initiate atorvastatin 20 mg nightly.  Repeat lipid panel in 3 months and adjust the dose if needed.

## 2023-08-28 ENCOUNTER — OFFICE VISIT (OUTPATIENT)
Dept: CARDIOLOGY | Facility: CLINIC | Age: 68
End: 2023-08-28
Payer: MEDICARE

## 2023-08-28 VITALS
WEIGHT: 201.2 LBS | BODY MASS INDEX: 32.33 KG/M2 | HEART RATE: 65 BPM | HEIGHT: 66 IN | DIASTOLIC BLOOD PRESSURE: 65 MMHG | SYSTOLIC BLOOD PRESSURE: 132 MMHG

## 2023-08-28 DIAGNOSIS — E78.2 MIXED HYPERLIPIDEMIA: ICD-10-CM

## 2023-08-28 DIAGNOSIS — I42.2 HYPERTROPHIC CARDIOMYOPATHY: ICD-10-CM

## 2023-08-28 DIAGNOSIS — I25.10 CORONARY ARTERY DISEASE INVOLVING NATIVE CORONARY ARTERY OF NATIVE HEART WITHOUT ANGINA PECTORIS: Primary | Chronic | ICD-10-CM

## 2023-08-28 DIAGNOSIS — I10 PRIMARY HYPERTENSION: Chronic | ICD-10-CM

## 2023-08-28 PROCEDURE — 1160F RVW MEDS BY RX/DR IN RCRD: CPT | Performed by: INTERNAL MEDICINE

## 2023-08-28 PROCEDURE — 3075F SYST BP GE 130 - 139MM HG: CPT | Performed by: INTERNAL MEDICINE

## 2023-08-28 PROCEDURE — 3078F DIAST BP <80 MM HG: CPT | Performed by: INTERNAL MEDICINE

## 2023-08-28 PROCEDURE — 1159F MED LIST DOCD IN RCRD: CPT | Performed by: INTERNAL MEDICINE

## 2023-08-28 PROCEDURE — 93000 ELECTROCARDIOGRAM COMPLETE: CPT | Performed by: INTERNAL MEDICINE

## 2023-08-28 PROCEDURE — 99204 OFFICE O/P NEW MOD 45 MIN: CPT | Performed by: INTERNAL MEDICINE

## 2023-08-28 RX ORDER — METOPROLOL TARTRATE 50 MG/1
50 TABLET, FILM COATED ORAL 2 TIMES DAILY
Qty: 180 TABLET | Refills: 1 | Status: SHIPPED | OUTPATIENT
Start: 2023-08-28

## 2023-08-28 RX ORDER — ATORVASTATIN CALCIUM 20 MG/1
20 TABLET, FILM COATED ORAL DAILY
Qty: 90 TABLET | Refills: 3 | Status: SHIPPED | OUTPATIENT
Start: 2023-08-28

## 2023-08-28 NOTE — ASSESSMENT & PLAN NOTE
She is currently stable with no chest pain.  She does report minimal shortness of breath.  We will do an echocardiogram to reevaluate LV function and valvular abnormalities.  Continue aspirin and beta-blockers.  Will reinitiate statins.

## 2023-08-28 NOTE — ASSESSMENT & PLAN NOTE
Documented multiple echocardiograms done at previous cardiologist office.  Loud systolic murmur audible.  Echocardiogram will be repeated to evaluate the LVOT gradient.  Continue metoprolol for now.

## 2023-08-28 NOTE — ASSESSMENT & PLAN NOTE
Blood pressure is reasonably well controlled in the office today.  We will continue metoprolol tartrate 50 mg twice daily.  Her medication list also includes losartan, however not sure whether she is still taking it or not.  Recent labs showed stable control lites and renal function.

## 2023-09-14 ENCOUNTER — OFFICE VISIT (OUTPATIENT)
Dept: FAMILY MEDICINE CLINIC | Facility: CLINIC | Age: 68
End: 2023-09-14
Payer: MEDICARE

## 2023-09-14 VITALS
DIASTOLIC BLOOD PRESSURE: 73 MMHG | BODY MASS INDEX: 31.31 KG/M2 | WEIGHT: 194.8 LBS | RESPIRATION RATE: 22 BRPM | HEIGHT: 66 IN | HEART RATE: 96 BPM | TEMPERATURE: 98.4 F | SYSTOLIC BLOOD PRESSURE: 118 MMHG | OXYGEN SATURATION: 96 %

## 2023-09-14 DIAGNOSIS — R05.1 ACUTE COUGH: ICD-10-CM

## 2023-09-14 DIAGNOSIS — F33.1 MODERATE EPISODE OF RECURRENT MAJOR DEPRESSIVE DISORDER: Chronic | ICD-10-CM

## 2023-09-14 DIAGNOSIS — J18.1 LOBAR PNEUMONIA: Primary | ICD-10-CM

## 2023-09-14 DIAGNOSIS — E66.09 CLASS 1 OBESITY DUE TO EXCESS CALORIES WITH SERIOUS COMORBIDITY AND BODY MASS INDEX (BMI) OF 31.0 TO 31.9 IN ADULT: ICD-10-CM

## 2023-09-14 DIAGNOSIS — I10 PRIMARY HYPERTENSION: Chronic | ICD-10-CM

## 2023-09-14 PROBLEM — E66.811 CLASS 1 OBESITY DUE TO EXCESS CALORIES WITH SERIOUS COMORBIDITY AND BODY MASS INDEX (BMI) OF 32.0 TO 32.9 IN ADULT: Chronic | Status: RESOLVED | Noted: 2023-05-04 | Resolved: 2023-09-14

## 2023-09-14 PROBLEM — E66.811 CLASS 1 OBESITY DUE TO EXCESS CALORIES WITH SERIOUS COMORBIDITY AND BODY MASS INDEX (BMI) OF 31.0 TO 31.9 IN ADULT: Status: ACTIVE | Noted: 2023-05-04

## 2023-09-14 PROCEDURE — 3078F DIAST BP <80 MM HG: CPT | Performed by: FAMILY MEDICINE

## 2023-09-14 PROCEDURE — 99214 OFFICE O/P EST MOD 30 MIN: CPT | Performed by: FAMILY MEDICINE

## 2023-09-14 PROCEDURE — 3074F SYST BP LT 130 MM HG: CPT | Performed by: FAMILY MEDICINE

## 2023-09-14 RX ORDER — PROMETHAZINE HYDROCHLORIDE AND CODEINE PHOSPHATE 6.25; 1 MG/5ML; MG/5ML
5 SOLUTION ORAL EVERY 4 HOURS PRN
Qty: 180 ML | Refills: 0 | Status: SHIPPED | OUTPATIENT
Start: 2023-09-14 | End: 2023-09-14 | Stop reason: SDUPTHER

## 2023-09-14 RX ORDER — BUPROPION HYDROCHLORIDE 450 MG/1
450 TABLET, FILM COATED, EXTENDED RELEASE ORAL DAILY
Qty: 30 TABLET | Refills: 5 | Status: SHIPPED | OUTPATIENT
Start: 2023-09-14

## 2023-09-14 RX ORDER — PROMETHAZINE HYDROCHLORIDE AND CODEINE PHOSPHATE 6.25; 1 MG/5ML; MG/5ML
5 SOLUTION ORAL EVERY 4 HOURS PRN
Qty: 180 ML | Refills: 0 | Status: SHIPPED | OUTPATIENT
Start: 2023-09-14

## 2023-09-14 NOTE — PROGRESS NOTES
"Chief Complaint  Follow-up (09/06/23 had upper right lobe pneumonia.)    Subjective        Elysia Babcock presents to [facility]  History of Present Illness  She is here today for a follow-up for a recent diagnosis of pneumonia.  She is  and lives with her son and daughter-in-law.  She spent quite a bit of her adult life in [place].  She was a  and she is now retired.  She has obesity, coronary artery disease status post stent x1 in 2017, major depression, hypertension, hyperlipidemia and vitamin D deficiency. Her dad had cad.       She is was in the acute care since her last visit and diagnosed with pneumonia. She is still having a cough. She is mildly SOB but denies fevers.      The patient has no other complaints today and denies chest pain, weakness, numbness, nausea, vomiting, diarrhea, dizziness or syncopal event.      Objective   Vital Signs:  /73 (BP Location: Left arm, Patient Position: Sitting, Cuff Size: Adult)   Pulse 96   Temp 98.4 °F (36.9 °C) (Tympanic)   Resp 22   Ht 167.6 cm (65.98\")   Wt 88.4 kg (194 lb 12.8 oz)   SpO2 96%   BMI 31.46 kg/m²   Estimated body mass index is 31.46 kg/m² as calculated from the following:    Height as of this encounter: 167.6 cm (65.98\").    Weight as of this encounter: 88.4 kg (194 lb 12.8 oz).               Physical Exam  Vitals reviewed.   Constitutional:       Appearance: Normal appearance. She is well-developed.   HENT:      Head: Normocephalic and atraumatic.      Right Ear: External ear normal.      Left Ear: External ear normal.      Mouth/Throat:      Pharynx: No oropharyngeal exudate.   Eyes:      Conjunctiva/sclera: Conjunctivae normal.      Pupils: Pupils are equal, round, and reactive to light.   Neck:      Vascular: No carotid bruit.   Cardiovascular:      Rate and Rhythm: Normal rate and regular rhythm.      Heart sounds: No murmur heard.    No friction rub. No gallop.   Pulmonary:      " Effort: Pulmonary effort is normal.      Breath sounds: Normal breath sounds. No wheezing or rhonchi.   Abdominal:      General: There is no distension.   Skin:     General: Skin is warm and dry.   Neurological:      Mental Status: She is alert and oriented to person, place, and time.      Cranial Nerves: No cranial nerve deficit.      Motor: No weakness.   Psychiatric:         Mood and Affect: Mood and affect normal.         Behavior: Behavior normal.         Thought Content: Thought content normal.         Judgment: Judgment normal.      Result Review :    CMP          6/5/2023    09:35   CMP   Glucose 95    BUN 22    Creatinine 0.89    EGFR 70.7    Sodium 139    Potassium 4.7    Chloride 104    Calcium 9.4    Total Protein 6.7    Albumin 4.4    Globulin 2.3    Total Bilirubin 0.6    Alkaline Phosphatase 61    AST (SGOT) 22    ALT (SGPT) 18    Albumin/Globulin Ratio 1.9    BUN/Creatinine Ratio 24.7    Anion Gap 7.0      CBC          6/5/2023    09:35   CBC   WBC 7.55    RBC 4.46    Hemoglobin 14.0    Hematocrit 41.6    MCV 93.3    MCH 31.4    MCHC 33.7    RDW 13.5    Platelets 156      Lipid Panel          6/5/2023    09:35   Lipid Panel   Total Cholesterol 230    Triglycerides 62    HDL Cholesterol 69    VLDL Cholesterol 11    LDL Cholesterol  150    LDL/HDL Ratio 2.15      TSH          6/5/2023    09:35   TSH   TSH 2.250                   Assessment and Plan   Diagnoses and all orders for this visit:    1. Lobar pneumonia (Primary)  -     promethazine-codeine (PHENERGAN with CODEINE) 6.25-10 MG/5ML solution; Take 5 mL by mouth Every 4 (Four) Hours As Needed for Cough.  Dispense: 180 mL; Refill: 0    2. Primary hypertension  Assessment & Plan:  Hypertension is improving with treatment.  Continue current treatment regimen.  Dietary sodium restriction.  Weight loss.  Blood pressure will be reassessed at the next regular appointment.      3. Class 1 obesity due to excess calories with serious comorbidity and body  mass index (BMI) of 31.0 to 31.9 in adult  Assessment & Plan:  Patient's (Body mass index is 31.46 kg/m².) indicates that they are obese (BMI >30) with health conditions that include hypertension and dyslipidemias . Weight is improving with lifestyle modifications. BMI  is above average; BMI management plan is completed. We discussed low calorie, low carb based diet program, portion control, and increasing exercise.       4. Moderate episode of recurrent major depressive disorder  Assessment & Plan:  Patient's depression is recurrent and is moderate without psychosis. Their depression is currently active and the condition is improving with treatment. This will be reassessed at the next regular appointment. F/U as described:patient will continue current medication therapy.    -     buPROPion XL (FORFIVO XL) 450 MG 24 hr tablet; Take 1 tablet by mouth Daily.  Dispense: 30 tablet; Refill: 5             Follow Up   Return if symptoms worsen or fail to improve.  Patient was given instructions and counseling regarding her condition or for health maintenance advice. Please see specific information pulled into the AVS if appropriate.

## 2023-09-14 NOTE — ASSESSMENT & PLAN NOTE
Patient's (Body mass index is 31.46 kg/m².) indicates that they are obese (BMI >30) with health conditions that include hypertension and dyslipidemias . Weight is improving with lifestyle modifications. BMI  is above average; BMI management plan is completed. We discussed low calorie, low carb based diet program, portion control, and increasing exercise.

## 2023-09-15 ENCOUNTER — TELEPHONE (OUTPATIENT)
Dept: FAMILY MEDICINE CLINIC | Facility: CLINIC | Age: 68
End: 2023-09-15
Payer: MEDICARE

## 2023-09-15 NOTE — TELEPHONE ENCOUNTER
[facility] no longer carries promethazine w codeine syrup.  Do you want to send something else for patient?

## 2023-10-13 ENCOUNTER — TRANSCRIBE ORDERS (OUTPATIENT)
Dept: GENERAL RADIOLOGY | Facility: HOSPITAL | Age: 68
End: 2023-10-13
Payer: MEDICARE

## 2023-10-13 ENCOUNTER — LAB (OUTPATIENT)
Dept: LAB | Facility: HOSPITAL | Age: 68
End: 2023-10-13
Payer: MEDICARE

## 2023-10-13 ENCOUNTER — OFFICE VISIT (OUTPATIENT)
Dept: FAMILY MEDICINE CLINIC | Facility: CLINIC | Age: 68
End: 2023-10-13
Payer: MEDICARE

## 2023-10-13 ENCOUNTER — HOSPITAL ENCOUNTER (OUTPATIENT)
Dept: GENERAL RADIOLOGY | Facility: HOSPITAL | Age: 68
Discharge: HOME | End: 2023-10-13
Payer: MEDICARE

## 2023-10-13 VITALS
HEIGHT: 66 IN | DIASTOLIC BLOOD PRESSURE: 60 MMHG | SYSTOLIC BLOOD PRESSURE: 137 MMHG | HEART RATE: 55 BPM | WEIGHT: 208.5 LBS | TEMPERATURE: 97.5 F | BODY MASS INDEX: 33.51 KG/M2 | OXYGEN SATURATION: 93 %

## 2023-10-13 DIAGNOSIS — I50.20 SYSTOLIC CONGESTIVE HEART FAILURE, UNSPECIFIED HF CHRONICITY: ICD-10-CM

## 2023-10-13 DIAGNOSIS — T80.90XA INJECTION SITE REACTION, INITIAL ENCOUNTER: ICD-10-CM

## 2023-10-13 DIAGNOSIS — R06.02 SHORTNESS OF BREATH: ICD-10-CM

## 2023-10-13 DIAGNOSIS — R05.1 ACUTE COUGH: Primary | ICD-10-CM

## 2023-10-13 DIAGNOSIS — R60.9 FLUID RETENTION: ICD-10-CM

## 2023-10-13 DIAGNOSIS — Z86.79 HISTORY OF HEART FAILURE: ICD-10-CM

## 2023-10-13 DIAGNOSIS — R05.1 ACUTE COUGH: ICD-10-CM

## 2023-10-13 LAB
BASOPHILS # BLD AUTO: 0.03 10*3/MM3 (ref 0–0.2)
BASOPHILS NFR BLD AUTO: 0.4 % (ref 0–1.5)
DEPRECATED RDW RBC AUTO: 45.9 FL (ref 37–54)
EOSINOPHIL # BLD AUTO: 0.29 10*3/MM3 (ref 0–0.4)
EOSINOPHIL NFR BLD AUTO: 4.2 % (ref 0.3–6.2)
ERYTHROCYTE [DISTWIDTH] IN BLOOD BY AUTOMATED COUNT: 13.6 % (ref 12.3–15.4)
HCT VFR BLD AUTO: 34.9 % (ref 34–46.6)
HGB BLD-MCNC: 11.8 G/DL (ref 12–15.9)
IMM GRANULOCYTES # BLD AUTO: 0.02 10*3/MM3 (ref 0–0.05)
IMM GRANULOCYTES NFR BLD AUTO: 0.3 % (ref 0–0.5)
LYMPHOCYTES # BLD AUTO: 1.36 10*3/MM3 (ref 0.7–3.1)
LYMPHOCYTES NFR BLD AUTO: 19.7 % (ref 19.6–45.3)
MCH RBC QN AUTO: 31.8 PG (ref 26.6–33)
MCHC RBC AUTO-ENTMCNC: 33.8 G/DL (ref 31.5–35.7)
MCV RBC AUTO: 94.1 FL (ref 79–97)
MONOCYTES # BLD AUTO: 0.71 10*3/MM3 (ref 0.1–0.9)
MONOCYTES NFR BLD AUTO: 10.3 % (ref 5–12)
NEUTROPHILS NFR BLD AUTO: 4.5 10*3/MM3 (ref 1.7–7)
NEUTROPHILS NFR BLD AUTO: 65.1 % (ref 42.7–76)
NRBC BLD AUTO-RTO: 0 /100 WBC (ref 0–0.2)
NT-PROBNP SERPL-MCNC: 4840 PG/ML (ref 0–900)
PLATELET # BLD AUTO: 117 10*3/MM3 (ref 140–450)
PMV BLD AUTO: 11.8 FL (ref 6–12)
POTASSIUM SERPL-SCNC: 4.1 MMOL/L (ref 3.5–5.2)
RBC # BLD AUTO: 3.71 10*6/MM3 (ref 3.77–5.28)
WBC NRBC COR # BLD: 6.91 10*3/MM3 (ref 3.4–10.8)

## 2023-10-13 PROCEDURE — 36415 COLL VENOUS BLD VENIPUNCTURE: CPT

## 2023-10-13 PROCEDURE — 85025 COMPLETE CBC W/AUTO DIFF WBC: CPT

## 2023-10-13 PROCEDURE — 83880 ASSAY OF NATRIURETIC PEPTIDE: CPT

## 2023-10-13 PROCEDURE — 84132 ASSAY OF SERUM POTASSIUM: CPT

## 2023-10-13 PROCEDURE — 71046 X-RAY EXAM CHEST 2 VIEWS: CPT

## 2023-10-13 RX ORDER — FUROSEMIDE 40 MG/1
40 TABLET ORAL DAILY
Qty: 7 TABLET | Refills: 0 | Status: SHIPPED | OUTPATIENT
Start: 2023-10-13 | End: 2023-10-20

## 2023-10-13 RX ORDER — DEXTROMETHORPHAN HYDROBROMIDE AND PROMETHAZINE HYDROCHLORIDE 15; 6.25 MG/5ML; MG/5ML
5 SYRUP ORAL NIGHTLY PRN
Qty: 120 ML | Refills: 0 | Status: SHIPPED | OUTPATIENT
Start: 2023-10-13

## 2023-10-13 NOTE — PROGRESS NOTES
"Chief Complaint  Shortness of Breath (Having shortness of breath, has had congestive heart failure and was given Furosemide to get fluid off by her cardiologist in [place]. Since she was here on September 14th she has gained 14 pounds. She wasn't able to get nebulizer medication  she was prescribed due to not being covered by insurance. ) and Injections (She got injections on Monday and in right arm she got Flu and Covid, arm is red and warm to the touch. Left arm she got pneumonia and RSV had a red place that is smaller and a little warm to the touch. )    Subjective        Elysia Babcock presents to [facility]  History of Present Illness  Elysia presents to the clinic with complaints of SOA that has been ongoing, she was recently diagnosed with pneumonia and she has finished her treatment for it. States yesterday she was so SOA that she could only walk a couple of steps before having to stop and rest, and that she had bad neck pain and had to use a heating pad but did not get any relief. She has been having chills, and an ongoing cough. Denies any chest pain, fever, body aches, N/V/D. She has a history of CHF and had previously used lasix. She also got 4 vaccines recently, 2 in each arm and is having redness and pain at injection sites.     We will obtain a BNP for her today to check due to CHF history. CXR also ordered. If BNP comes back elevated will order echo.       Objective   Vital Signs:  /60 (BP Location: Left arm, Patient Position: Sitting)   Pulse 55   Temp 97.5 °F (36.4 °C) (Infrared)   Ht 167.6 cm (65.98\")   Wt 94.6 kg (208 lb 8 oz)   SpO2 93%   BMI 33.67 kg/m²   Estimated body mass index is 33.67 kg/m² as calculated from the following:    Height as of this encounter: 167.6 cm (65.98\").    Weight as of this encounter: 94.6 kg (208 lb 8 oz).               Physical Exam  Constitutional:       Appearance: Normal appearance.   HENT:      Nose: Nose normal.      " Mouth/Throat:      Mouth: Mucous membranes are moist.   Cardiovascular:      Rate and Rhythm: Normal rate and regular rhythm.      Pulses: Normal pulses.      Heart sounds: Normal heart sounds.   Pulmonary:      Effort: Pulmonary effort is normal.      Breath sounds: Rhonchi present.   Skin:     General: Skin is warm and dry.   Neurological:      General: No focal deficit present.      Mental Status: She is alert and oriented to person, place, and time.   Psychiatric:         Mood and Affect: Mood normal.         Behavior: Behavior normal.        Result Review :                   Assessment and Plan   Diagnoses and all orders for this visit:    1. Acute cough (Primary)  -     promethazine-dextromethorphan (PROMETHAZINE-DM) 6.25-15 MG/5ML syrup; Take 5 mL by mouth At Night As Needed for Cough.  Dispense: 120 mL; Refill: 0  -     Cancel: XR Chest 1 View; Future  -     CBC w AUTO Differential; Future  -     XR chest 2 vw; Future    2. Shortness of breath  -     triamcinolone acetonide (KENALOG) injection 60 mg  -     Cancel: XR Chest 1 View; Future  -     CBC w AUTO Differential; Future  -     XR chest 2 vw; Future    3. History of heart failure  -     proBNP; Future  -     furosemide (Lasix) 40 MG tablet; Take 1 tablet by mouth Daily for 7 days.  Dispense: 7 tablet; Refill: 0  -     Potassium; Future    4. Systolic congestive heart failure, unspecified HF chronicity  -     proBNP; Future    5. Injection site reaction, initial encounter  Comments:  Ice pack to site. Ibu/Tyl as needed for pain.    6. Fluid retention  -     furosemide (Lasix) 40 MG tablet; Take 1 tablet by mouth Daily for 7 days.  Dispense: 7 tablet; Refill: 0  -     Potassium; Future             Follow Up   Return if symptoms worsen or fail to improve.  Patient was given instructions and counseling regarding her condition or for health maintenance advice. Please see specific information pulled into the AVS if appropriate.

## 2023-10-16 DIAGNOSIS — R06.02 SHORTNESS OF BREATH: ICD-10-CM

## 2023-10-16 DIAGNOSIS — R79.89 ELEVATED BRAIN NATRIURETIC PEPTIDE (BNP) LEVEL: Primary | ICD-10-CM

## 2023-10-16 NOTE — PROGRESS NOTES
BNP is elevatd at 4,840.  Platelets 117. Please check on her and see how she is doing since starting lasix. Echo ordered by Dr Vazquez already. Due to have done on 10/20. Advise to follow up with him after

## 2023-10-20 ENCOUNTER — TELEPHONE (OUTPATIENT)
Dept: CARDIOLOGY | Facility: CLINIC | Age: 68
End: 2023-10-20
Payer: MEDICARE

## 2023-10-20 NOTE — TELEPHONE ENCOUNTER
----- Message from Maddie Velez RN sent at 10/20/2023  3:39 PM EDT -----    ----- Message -----  From: Matthew Vazquez MD  Sent: 10/20/2023   3:36 PM EDT  To: Maddie Velez RN    Echocardiogram showed normal heart function.  There are no major valvular problems.  The findings are unchanged from previous studies done with previous cardiology office.    At this time, recommend to continue all the current medications.  Lipid panel ordered for late November, encouraged to complete the study.  Further recommendations based on lab results.    Follow-up as scheduled earlier.      Electronically signed by Matthew Vazquez MD, 10/20/23, 3:36 PM EDT.

## 2023-11-07 ENCOUNTER — OFFICE VISIT (OUTPATIENT)
Dept: FAMILY MEDICINE CLINIC | Facility: CLINIC | Age: 68
End: 2023-11-07
Payer: MEDICARE

## 2023-11-07 VITALS
SYSTOLIC BLOOD PRESSURE: 111 MMHG | HEIGHT: 66 IN | TEMPERATURE: 97.4 F | OXYGEN SATURATION: 100 % | DIASTOLIC BLOOD PRESSURE: 94 MMHG | HEART RATE: 101 BPM | BODY MASS INDEX: 33.52 KG/M2 | WEIGHT: 208.6 LBS | RESPIRATION RATE: 18 BRPM

## 2023-11-07 DIAGNOSIS — E78.2 MIXED HYPERLIPIDEMIA: ICD-10-CM

## 2023-11-07 DIAGNOSIS — R41.840 ATTENTION DEFICIT: ICD-10-CM

## 2023-11-07 DIAGNOSIS — M85.89 OSTEOPENIA OF MULTIPLE SITES: ICD-10-CM

## 2023-11-07 DIAGNOSIS — I10 PRIMARY HYPERTENSION: Primary | Chronic | ICD-10-CM

## 2023-11-07 DIAGNOSIS — E66.09 CLASS 1 OBESITY DUE TO EXCESS CALORIES WITH SERIOUS COMORBIDITY AND BODY MASS INDEX (BMI) OF 33.0 TO 33.9 IN ADULT: ICD-10-CM

## 2023-11-07 PROBLEM — E66.811 CLASS 1 OBESITY DUE TO EXCESS CALORIES WITH SERIOUS COMORBIDITY AND BODY MASS INDEX (BMI) OF 31.0 TO 31.9 IN ADULT: Chronic | Status: RESOLVED | Noted: 2023-05-04 | Resolved: 2023-11-07

## 2023-11-07 PROCEDURE — 3080F DIAST BP >= 90 MM HG: CPT | Performed by: FAMILY MEDICINE

## 2023-11-07 PROCEDURE — 3074F SYST BP LT 130 MM HG: CPT | Performed by: FAMILY MEDICINE

## 2023-11-07 PROCEDURE — 99214 OFFICE O/P EST MOD 30 MIN: CPT | Performed by: FAMILY MEDICINE

## 2023-11-07 RX ORDER — BUPROPION HYDROCHLORIDE 150 MG/1
150 TABLET ORAL DAILY
Start: 2023-11-07

## 2023-11-07 RX ORDER — IPRATROPIUM BROMIDE AND ALBUTEROL SULFATE 2.5; .5 MG/3ML; MG/3ML
SOLUTION RESPIRATORY (INHALATION)
COMMUNITY
Start: 2023-10-16

## 2023-11-07 RX ORDER — FUROSEMIDE 40 MG/1
40 TABLET ORAL DAILY
Qty: 7 TABLET | Refills: 0 | Status: CANCELLED | OUTPATIENT
Start: 2023-11-07 | End: 2023-11-14

## 2023-11-07 RX ORDER — ALENDRONATE SODIUM 70 MG/1
70 TABLET ORAL
Start: 2023-11-07

## 2023-11-07 NOTE — ASSESSMENT & PLAN NOTE
Patient's (Body mass index is 33.69 kg/m².) indicates that they are obese (BMI >30) with health conditions that include hypertension and dyslipidemias . Weight is worsening. BMI  is above average; BMI management plan is completed. We discussed low calorie, low carb based diet program, portion control, and increasing exercise.

## 2023-11-07 NOTE — ASSESSMENT & PLAN NOTE
Lipid abnormalities are improving with lifestyle modifications.  Nutritional counseling was provided.  Lipids will be reassessed in 6 months.    The 10-year ASCVD risk score (Reagan BHATTI, et al., 2019) is: 8.6%    Values used to calculate the score:      Age: 68 years      Sex: Female      Is Non- : No      Diabetic: No      Tobacco smoker: No      Systolic Blood Pressure: 111 mmHg      Is BP treated: Yes      HDL Cholesterol: 69 mg/dL      Total Cholesterol: 230 mg/dL

## 2024-01-25 ENCOUNTER — HOSPITAL ENCOUNTER (EMERGENCY)
Facility: HOSPITAL | Age: 69
Discharge: HOME | End: 2024-01-25
Attending: EMERGENCY MEDICINE
Payer: MEDICARE

## 2024-01-25 ENCOUNTER — APPOINTMENT (OUTPATIENT)
Dept: CT IMAGING | Facility: HOSPITAL | Age: 69
End: 2024-01-25
Payer: MEDICARE

## 2024-01-25 ENCOUNTER — APPOINTMENT (OUTPATIENT)
Dept: GENERAL RADIOLOGY | Facility: HOSPITAL | Age: 69
End: 2024-01-25
Payer: MEDICARE

## 2024-01-25 VITALS
HEIGHT: 66 IN | RESPIRATION RATE: 18 BRPM | WEIGHT: 216.49 LBS | SYSTOLIC BLOOD PRESSURE: 154 MMHG | DIASTOLIC BLOOD PRESSURE: 82 MMHG | OXYGEN SATURATION: 95 % | HEART RATE: 66 BPM | BODY MASS INDEX: 34.79 KG/M2 | TEMPERATURE: 97.5 F

## 2024-01-25 DIAGNOSIS — I31.39 PERICARDIAL EFFUSION: Primary | ICD-10-CM

## 2024-01-25 LAB
ALBUMIN SERPL-MCNC: 4.1 G/DL (ref 3.5–5.2)
ALBUMIN/GLOB SERPL: 1.5 G/DL
ALP SERPL-CCNC: 70 U/L (ref 39–117)
ALT SERPL W P-5'-P-CCNC: 9 U/L (ref 1–33)
ANION GAP SERPL CALCULATED.3IONS-SCNC: 10.9 MMOL/L (ref 5–15)
AST SERPL-CCNC: 12 U/L (ref 1–32)
BASOPHILS # BLD AUTO: 0.04 10*3/MM3 (ref 0–0.2)
BASOPHILS NFR BLD AUTO: 0.7 % (ref 0–1.5)
BILIRUB SERPL-MCNC: 0.8 MG/DL (ref 0–1.2)
BUN SERPL-MCNC: 18 MG/DL (ref 8–23)
BUN/CREAT SERPL: 21.7 (ref 7–25)
CALCIUM SPEC-SCNC: 9.5 MG/DL (ref 8.6–10.5)
CHLORIDE SERPL-SCNC: 103 MMOL/L (ref 98–107)
CO2 SERPL-SCNC: 24.1 MMOL/L (ref 22–29)
CREAT SERPL-MCNC: 0.83 MG/DL (ref 0.57–1)
DEPRECATED RDW RBC AUTO: 55.2 FL (ref 37–54)
EGFRCR SERPLBLD CKD-EPI 2021: 76.4 ML/MIN/1.73
EOSINOPHIL # BLD AUTO: 0.08 10*3/MM3 (ref 0–0.4)
EOSINOPHIL NFR BLD AUTO: 1.4 % (ref 0.3–6.2)
ERYTHROCYTE [DISTWIDTH] IN BLOOD BY AUTOMATED COUNT: 14.8 % (ref 12.3–15.4)
FLUAV SUBTYP SPEC NAA+PROBE: NOT DETECTED
FLUBV RNA ISLT QL NAA+PROBE: NOT DETECTED
GLOBULIN UR ELPH-MCNC: 2.8 GM/DL
GLUCOSE SERPL-MCNC: 104 MG/DL (ref 65–99)
HCT VFR BLD AUTO: 38.6 % (ref 34–46.6)
HGB BLD-MCNC: 12.7 G/DL (ref 12–15.9)
HOLD SPECIMEN: NORMAL
HOLD SPECIMEN: NORMAL
IMM GRANULOCYTES # BLD AUTO: 0.03 10*3/MM3 (ref 0–0.05)
IMM GRANULOCYTES NFR BLD AUTO: 0.5 % (ref 0–0.5)
LYMPHOCYTES # BLD AUTO: 1.22 10*3/MM3 (ref 0.7–3.1)
LYMPHOCYTES NFR BLD AUTO: 20.8 % (ref 19.6–45.3)
MCH RBC QN AUTO: 33.2 PG (ref 26.6–33)
MCHC RBC AUTO-ENTMCNC: 32.9 G/DL (ref 31.5–35.7)
MCV RBC AUTO: 101 FL (ref 79–97)
MONOCYTES # BLD AUTO: 0.66 10*3/MM3 (ref 0.1–0.9)
MONOCYTES NFR BLD AUTO: 11.3 % (ref 5–12)
NEUTROPHILS NFR BLD AUTO: 3.83 10*3/MM3 (ref 1.7–7)
NEUTROPHILS NFR BLD AUTO: 65.3 % (ref 42.7–76)
NRBC BLD AUTO-RTO: 0 /100 WBC (ref 0–0.2)
NT-PROBNP SERPL-MCNC: 749.6 PG/ML (ref 0–900)
PLATELET # BLD AUTO: 161 10*3/MM3 (ref 140–450)
PMV BLD AUTO: 11.1 FL (ref 6–12)
POTASSIUM SERPL-SCNC: 4.4 MMOL/L (ref 3.5–5.2)
PROT SERPL-MCNC: 6.9 G/DL (ref 6–8.5)
RBC # BLD AUTO: 3.82 10*6/MM3 (ref 3.77–5.28)
RSV RNA NPH QL NAA+NON-PROBE: NOT DETECTED
SARS-COV-2 RNA RESP QL NAA+PROBE: NOT DETECTED
SODIUM SERPL-SCNC: 138 MMOL/L (ref 136–145)
TROPONIN T SERPL HS-MCNC: 9 NG/L
WBC NRBC COR # BLD AUTO: 5.86 10*3/MM3 (ref 3.4–10.8)
WHOLE BLOOD HOLD COAG: NORMAL
WHOLE BLOOD HOLD SPECIMEN: NORMAL

## 2024-01-25 PROCEDURE — 85025 COMPLETE CBC W/AUTO DIFF WBC: CPT | Performed by: EMERGENCY MEDICINE

## 2024-01-25 PROCEDURE — 36415 COLL VENOUS BLD VENIPUNCTURE: CPT

## 2024-01-25 PROCEDURE — 87637 SARSCOV2&INF A&B&RSV AMP PRB: CPT

## 2024-01-25 PROCEDURE — 74177 CT ABD & PELVIS W/CONTRAST: CPT

## 2024-01-25 PROCEDURE — 80053 COMPREHEN METABOLIC PANEL: CPT | Performed by: EMERGENCY MEDICINE

## 2024-01-25 PROCEDURE — 25510000001 IOPAMIDOL PER 1 ML: Performed by: EMERGENCY MEDICINE

## 2024-01-25 PROCEDURE — 71260 CT THORAX DX C+: CPT

## 2024-01-25 PROCEDURE — 93005 ELECTROCARDIOGRAM TRACING: CPT

## 2024-01-25 PROCEDURE — 93010 ELECTROCARDIOGRAM REPORT: CPT | Performed by: INTERNAL MEDICINE

## 2024-01-25 PROCEDURE — 99285 EMERGENCY DEPT VISIT HI MDM: CPT

## 2024-01-25 PROCEDURE — 71045 X-RAY EXAM CHEST 1 VIEW: CPT

## 2024-01-25 PROCEDURE — 84484 ASSAY OF TROPONIN QUANT: CPT | Performed by: EMERGENCY MEDICINE

## 2024-01-25 PROCEDURE — 83880 ASSAY OF NATRIURETIC PEPTIDE: CPT | Performed by: EMERGENCY MEDICINE

## 2024-01-25 RX ORDER — SODIUM CHLORIDE 0.9 % (FLUSH) 0.9 %
10 SYRINGE (ML) INJECTION AS NEEDED
Status: DISCONTINUED | OUTPATIENT
Start: 2024-01-25 | End: 2024-01-25 | Stop reason: HOSPADM

## 2024-01-25 RX ADMIN — IOPAMIDOL 100 ML: 755 INJECTION, SOLUTION INTRAVENOUS at 14:03

## 2024-01-25 NOTE — ED PROVIDER NOTES
"SHARED VISIT NOTE:    Patient is 68 y.o. year old female that presents to the ED for evaluation of abdominal pain.     Physical Exam    ED Course:    /69   Pulse 65   Temp 97.5 °F (36.4 °C) (Oral)   Resp 18   Ht 167.6 cm (66\")   Wt 98.2 kg (216 lb 7.9 oz)   LMP  (LMP Unknown)   SpO2 95%   Breastfeeding No   BMI 34.94 kg/m²   Results for orders placed or performed during the hospital encounter of 01/25/24   COVID-19, FLU A/B, RSV PCR 1 HR TAT - Swab, Nasopharynx    Specimen: Nasopharynx; Swab   Result Value Ref Range    COVID19 Not Detected Not Detected - Ref. Range    Influenza A PCR Not Detected Not Detected    Influenza B PCR Not Detected Not Detected    RSV, PCR Not Detected Not Detected   Comprehensive Metabolic Panel    Specimen: Arm, Right; Blood   Result Value Ref Range    Glucose 104 (H) 65 - 99 mg/dL    BUN 18 8 - 23 mg/dL    Creatinine 0.83 0.57 - 1.00 mg/dL    Sodium 138 136 - 145 mmol/L    Potassium 4.4 3.5 - 5.2 mmol/L    Chloride 103 98 - 107 mmol/L    CO2 24.1 22.0 - 29.0 mmol/L    Calcium 9.5 8.6 - 10.5 mg/dL    Total Protein 6.9 6.0 - 8.5 g/dL    Albumin 4.1 3.5 - 5.2 g/dL    ALT (SGPT) 9 1 - 33 U/L    AST (SGOT) 12 1 - 32 U/L    Alkaline Phosphatase 70 39 - 117 U/L    Total Bilirubin 0.8 0.0 - 1.2 mg/dL    Globulin 2.8 gm/dL    A/G Ratio 1.5 g/dL    BUN/Creatinine Ratio 21.7 7.0 - 25.0    Anion Gap 10.9 5.0 - 15.0 mmol/L    eGFR 76.4 >60.0 mL/min/1.73   BNP    Specimen: Arm, Right; Blood   Result Value Ref Range    proBNP 749.6 0.0 - 900.0 pg/mL   Single High Sensitivity Troponin T    Specimen: Arm, Right; Blood   Result Value Ref Range    HS Troponin T 9 <14 ng/L   CBC Auto Differential    Specimen: Arm, Right; Blood   Result Value Ref Range    WBC 5.86 3.40 - 10.80 10*3/mm3    RBC 3.82 3.77 - 5.28 10*6/mm3    Hemoglobin 12.7 12.0 - 15.9 g/dL    Hematocrit 38.6 34.0 - 46.6 %    .0 (H) 79.0 - 97.0 fL    MCH 33.2 (H) 26.6 - 33.0 pg    MCHC 32.9 31.5 - 35.7 g/dL    RDW 14.8 12.3 " - 15.4 %    RDW-SD 55.2 (H) 37.0 - 54.0 fl    MPV 11.1 6.0 - 12.0 fL    Platelets 161 140 - 450 10*3/mm3    Neutrophil % 65.3 42.7 - 76.0 %    Lymphocyte % 20.8 19.6 - 45.3 %    Monocyte % 11.3 5.0 - 12.0 %    Eosinophil % 1.4 0.3 - 6.2 %    Basophil % 0.7 0.0 - 1.5 %    Immature Grans % 0.5 0.0 - 0.5 %    Neutrophils, Absolute 3.83 1.70 - 7.00 10*3/mm3    Lymphocytes, Absolute 1.22 0.70 - 3.10 10*3/mm3    Monocytes, Absolute 0.66 0.10 - 0.90 10*3/mm3    Eosinophils, Absolute 0.08 0.00 - 0.40 10*3/mm3    Basophils, Absolute 0.04 0.00 - 0.20 10*3/mm3    Immature Grans, Absolute 0.03 0.00 - 0.05 10*3/mm3    nRBC 0.0 0.0 - 0.2 /100 WBC   ECG 12 Lead Other; periardial effusion   Result Value Ref Range    QT Interval 407 ms    QTC Interval 439 ms   Green Top (Gel)   Result Value Ref Range    Extra Tube Hold for add-ons.    Lavender Top   Result Value Ref Range    Extra Tube hold for add-on    Gold Top - SST   Result Value Ref Range    Extra Tube Hold for add-ons.    Light Blue Top   Result Value Ref Range    Extra Tube Hold for add-ons.      Medications   sodium chloride 0.9 % flush 10 mL (has no administration in time range)   iopamidol (ISOVUE-370) 76 % injection 100 mL (100 mL Intravenous Given 1/25/24 1403)     CT Abdomen Pelvis With Contrast    Result Date: 1/25/2024  Narrative: PROCEDURE: CT CHEST W CONTRAST DIAGNOSTIC, 1/25/2024, 13:53 CT ABDOMEN PELVIS W CONTRAST, 1/25/2024, 13:53  COMPARISON:  None INDICATIONS: right sided cp wtih sob  TECHNIQUE: After obtaining the patient's consent, CT images were obtained with non-ionic intravenous contrast material.   PROTOCOL:   Pulmonary embolism imaging protocol performed    RADIATION:   DLP: 686.7mGy*cm   Automated exposure control was utilized to minimize radiation dose. CONTRAST: 100cc Isovue 370 I.V.  FINDINGS:  Chest:  There is mild emphysematous lung disease.  There is bronchiectasis in the lung bases and mild basilar scarring.  No significant pulmonary nodules,  masses or acute infiltrates are present.  There is no significant hilar mediastinal lymphadenopathy.  There is enlargement of the central pulmonary arteries.  There are no pulmonary artery filling defects to suggest pulmonary embolism.  There is a moderate pericardial effusion present.  There is no pleural fluid present.  There are degenerative changes in the thoracic spine with mild scoliosis.  No destructive bone lesions are identified.  There is atherosclerotic disease with coronary artery calcification.  Abdomen and pelvis:  The liver and spleen appear unremarkable.  The gallbladder is surgically absent.  There is fatty replacement of the pancreas.  The adrenal glands appear normal.  The left kidney is normal.  There is renal cortical thinning in the upper pole the right kidney.  No stones are identified and there is no hydronephrosis or renal masses.  The appendix is normal.  There are no dilated or thickened loops of bowel.  There is extensive sigmoid diverticulosis with no evidence of diverticulitis.  The uterus is relatively atrophic.  No pelvic masses or fluid collections are identified.  The bladder is decompressed but appears normal.  There are postoperative changes of left total hip arthroplasty.  There is degenerative disc disease in the lumbar spine primarily at L5-S1.      Impression:   1. Mild emphysematous lung disease with bronchiectasis in the lung bases. 2. Moderate pericardial effusion. 3. Mild enlargement the central pulmonary arteries which may reflect some degree of pulmonary hypertension.  There is no evidence of pulmonary embolism. 4. Coronary atherosclerotic disease 5. Cholecystectomy 6. Colonic diverticulosis without diverticulitis 7. Status post left total hip arthroplasty     Laron Camejo MD       Electronically Signed and Approved By: Laron Camejo MD on 1/25/2024 at 15:02             CT Chest With Contrast Diagnostic    Result Date: 1/25/2024  Narrative: PROCEDURE: CT CHEST W  CONTRAST DIAGNOSTIC, 1/25/2024, 13:53 CT ABDOMEN PELVIS W CONTRAST, 1/25/2024, 13:53  COMPARISON:  None INDICATIONS: right sided cp wtih sob  TECHNIQUE: After obtaining the patient's consent, CT images were obtained with non-ionic intravenous contrast material.   PROTOCOL:   Pulmonary embolism imaging protocol performed    RADIATION:   DLP: 686.7mGy*cm   Automated exposure control was utilized to minimize radiation dose. CONTRAST: 100cc Isovue 370 I.V.  FINDINGS:  Chest:  There is mild emphysematous lung disease.  There is bronchiectasis in the lung bases and mild basilar scarring.  No significant pulmonary nodules, masses or acute infiltrates are present.  There is no significant hilar mediastinal lymphadenopathy.  There is enlargement of the central pulmonary arteries.  There are no pulmonary artery filling defects to suggest pulmonary embolism.  There is a moderate pericardial effusion present.  There is no pleural fluid present.  There are degenerative changes in the thoracic spine with mild scoliosis.  No destructive bone lesions are identified.  There is atherosclerotic disease with coronary artery calcification.  Abdomen and pelvis:  The liver and spleen appear unremarkable.  The gallbladder is surgically absent.  There is fatty replacement of the pancreas.  The adrenal glands appear normal.  The left kidney is normal.  There is renal cortical thinning in the upper pole the right kidney.  No stones are identified and there is no hydronephrosis or renal masses.  The appendix is normal.  There are no dilated or thickened loops of bowel.  There is extensive sigmoid diverticulosis with no evidence of diverticulitis.  The uterus is relatively atrophic.  No pelvic masses or fluid collections are identified.  The bladder is decompressed but appears normal.  There are postoperative changes of left total hip arthroplasty.  There is degenerative disc disease in the lumbar spine primarily at L5-S1.      Impression:   1.  Mild emphysematous lung disease with bronchiectasis in the lung bases. 2. Moderate pericardial effusion. 3. Mild enlargement the central pulmonary arteries which may reflect some degree of pulmonary hypertension.  There is no evidence of pulmonary embolism. 4. Coronary atherosclerotic disease 5. Cholecystectomy 6. Colonic diverticulosis without diverticulitis 7. Status post left total hip arthroplasty     Laron Camejo MD       Electronically Signed and Approved By: Laron Camejo MD on 1/25/2024 at 15:02             XR Chest 1 View    Result Date: 1/25/2024  Narrative: PROCEDURE: XR CHEST 1 VW  COMPARISON: [facility], CR, XR CHEST 2 VW, 8/26/2022, 17:56.  [facility], CR, XR CHEST 2 VW, 9/06/2023, 11:50.  [facility], CR, XR CHEST 2 VW, 10/13/2023, 17:02.  INDICATIONS: SOA Triage Protocol  FINDINGS:  Heart size appears within normal limits.  Mediastinal contour appears grossly normal.  There is atherosclerosis of the aortic arch.  No definite new focal or diffuse pulmonary infiltrate is identified.  Previously seen right upper lobe opacities appear improved.  There is some mild left basilar opacities which appear chronic and may represent scarring.  No definite pneumothorax or effusion.      Impression:   No definite radiographic findings of acute cardiopulmonary abnormality.       PRIMO SERRANO MD       Electronically Signed and Approved By: PRIMO SERRANO MD on 1/25/2024 at 11:42              MDM:    Procedures          SHARED VISIT ATTESTATION:    This visit was performed by both myself and an APC.  The substantive portion of the medical decision making was performed by this me and I made or approved the management plan and take responsibility for patient management.  All study impressions documented in this and in the APC note (if performed) were independently interpreted by me.      Kendall Ruelas DO  15:40 EST  01/25/24         Kendall Ruelas DO  01/25/24  0124

## 2024-01-25 NOTE — DISCHARGE INSTRUCTIONS
All your blood work, cardiac markers and EKG and chest x-ray within normal  CT of the chest shows that you have a pericardial effusion  I have discussed this with your cardiologist who will see him in office next week for an echocardiogram.  Please call their office tomorrow to set up your appointment.

## 2024-01-25 NOTE — ED PROVIDER NOTES
Time: 12:48 PM EST  Date of encounter:  2024  Independent Historian/Clinical History and Information was obtained by:   Patient    History is limited by: N/A    Chief Complaint   Patient presents with    Pain With Breathing     Patient states that when she has RUQ pain on inspiration. This has been going on for a week or so.         History of Present Illness:  Patient is a 68 y.o. year old female who presents to the emergency department for evaluation of right upper quadrant abdominal pain worse with inspiration that started Monday.  Patient has had a prior cholecystectomy.  She denies cough, nausea, vomiting, diarrhea, dysuria and hematuria.  Patient denies fall or injury.    Patient Care Team  Primary Care Provider: Devora Johnson DO    Past Medical History:     No Known Allergies  Past Medical History:   Diagnosis Date    Allergic     Arthritis     Asthma     CHF (congestive heart failure)     Coronary artery disease     Depression     Heart murmur     Hyperlipidemia     Hypertension     Obesity     Pneumonia 2023    Visual impairment      Past Surgical History:   Procedure Laterality Date     SECTION      CHOLECYSTECTOMY      COLONOSCOPY      CORONARY STENT PLACEMENT      HIP BIPOLAR REPLACEMENT      JOINT REPLACEMENT      TONSILLECTOMY       Family History   Problem Relation Age of Onset    Asthma Mother     Depression Mother     Hyperlipidemia Mother     Heart disease Father        Home Medications:  Prior to Admission medications    Medication Sig Start Date End Date Taking? Authorizing Provider   albuterol sulfate  (90 Base) MCG/ACT inhaler Inhale 2 puffs Every 4 (Four) Hours As Needed for Wheezing. 23   Jamila Burrows APRN   alendronate (Fosamax) 70 MG tablet Take 1 tablet by mouth Every 7 (Seven) Days. 23   Devora Johnson DO   aspirin 81 MG EC tablet Take 1 tablet by mouth Daily.    Emergency, Nurse Epic, RN   B Complex Vitamins (VITAMIN B COMPLEX PO)  "Take  by mouth.    Carin Turner MD   buPROPion XL (Wellbutrin XL) 150 MG 24 hr tablet Take 1 tablet by mouth Daily. 11/7/23   Devora Johnson DO   FLUoxetine (PROzac) 40 MG capsule  4/23/23   Carin Turner MD   ibuprofen (ADVIL,MOTRIN) 400 MG tablet Take 1 tablet by mouth Every 6 (Six) Hours As Needed for Mild Pain.    Carin Turner MD   ipratropium-albuterol (DUO-NEB) 0.5-2.5 mg/3 ml nebulizer USE 3 ML VIA NEBULIZER EVERY 4 HOURS AS NEEDED FOR WHEEZING 10/16/23   Carin Turner MD   metoprolol tartrate (LOPRESSOR) 50 MG tablet Take 1 tablet by mouth 2 (Two) Times a Day. 8/28/23   Matthew Vazquez MD   vitamin D3 125 MCG (5000 UT) capsule capsule Take 1 capsule by mouth Daily.    Carin Turner MD        Social History:   Social History     Tobacco Use    Smoking status: Never     Passive exposure: Never    Smokeless tobacco: Never    Tobacco comments:     No second hand smoke exposure    Vaping Use    Vaping Use: Never used   Substance Use Topics    Alcohol use: Yes     Alcohol/week: 6.0 standard drinks of alcohol     Types: 3 Glasses of wine, 3 Drinks containing 0.5 oz of alcohol per week     Comment: I enjoy happy hour.    Drug use: Never         Review of Systems:  Review of Systems   Constitutional: Negative.    HENT: Negative.     Eyes: Negative.    Respiratory: Negative.  Negative for cough.    Cardiovascular: Negative.    Gastrointestinal:  Positive for abdominal pain. Negative for diarrhea, nausea and vomiting.   Endocrine: Negative.    Genitourinary: Negative.    Musculoskeletal: Negative.    Skin: Negative.    Allergic/Immunologic: Negative.    Neurological: Negative.    Hematological: Negative.    Psychiatric/Behavioral: Negative.          Physical Exam:  /82   Pulse 66   Temp 97.5 °F (36.4 °C) (Oral)   Resp 18   Ht 167.6 cm (66\")   Wt 98.2 kg (216 lb 7.9 oz)   LMP  (LMP Unknown)   SpO2 95%   Breastfeeding No   BMI 34.94 kg/m²         Physical " Exam  Vitals and nursing note reviewed.   Constitutional:       Appearance: Normal appearance. She is normal weight.   HENT:      Head: Normocephalic and atraumatic.      Nose: Nose normal.      Mouth/Throat:      Mouth: Mucous membranes are moist.   Eyes:      Extraocular Movements: Extraocular movements intact.      Conjunctiva/sclera: Conjunctivae normal.      Pupils: Pupils are equal, round, and reactive to light.   Cardiovascular:      Rate and Rhythm: Normal rate and regular rhythm.      Heart sounds: Normal heart sounds.   Pulmonary:      Effort: Pulmonary effort is normal.      Breath sounds: Normal breath sounds. No wheezing or rhonchi.   Chest:      Chest wall: Tenderness present.       Abdominal:      General: Abdomen is flat. Bowel sounds are normal.      Palpations: Abdomen is soft.      Tenderness: There is generalized abdominal tenderness and tenderness in the right upper quadrant. There is right CVA tenderness and left CVA tenderness. There is no guarding or rebound.   Musculoskeletal:         General: Normal range of motion.      Cervical back: Normal range of motion and neck supple.   Skin:     General: Skin is warm and dry.   Neurological:      General: No focal deficit present.      Mental Status: She is alert and oriented to person, place, and time.   Psychiatric:         Mood and Affect: Mood normal.         Behavior: Behavior normal.                 Procedures:  Procedures      Medical Decision Making:      Comorbidities that affect care:    Asthma, Congestive Heart Failure, Coronary Artery Disease, Hypertension, Obesity    External Notes reviewed:    Previous Clinic Note: Office visit 11/7/2023 for PCP      The following orders were placed and all results were independently analyzed by me:  Orders Placed This Encounter   Procedures    COVID-19, FLU A/B, RSV PCR 1 HR TAT - Swab, Nasopharynx    XR Chest 1 View    CT Abdomen Pelvis With Contrast    CT Chest With Contrast Diagnostic    [place]  Draw    Comprehensive Metabolic Panel    BNP    Single High Sensitivity Troponin T    CBC Auto Differential    NPO Diet NPO Type: Strict NPO    Undress & Gown    Continuous Pulse Oximetry    Vital Signs    Inpatient Cardiology Consult    Oxygen Therapy- Nasal Cannula; Titrate 1-6 LPM Per SpO2; 90 - 95%    ECG 12 Lead Other; periardial effusion    Insert Peripheral IV    CBC & Differential    Green Top (Gel)    Lavender Top    Gold Top - SST    Light Blue Top       Medications Given in the Emergency Department:  Medications   sodium chloride 0.9 % flush 10 mL (has no administration in time range)   iopamidol (ISOVUE-370) 76 % injection 100 mL (100 mL Intravenous Given 1/25/24 1403)        ED Course:    The patient was initially evaluated in the triage area where orders were placed. The patient was later dispositioned by Jerica Huerta PA-C.      The patient was advised to stay for completion of workup which includes but is not limited to communication of labs and radiological results, reassessment and plan. The patient was advised that leaving prior to disposition by a provider could result in critical findings that are not communicated to the patient.     ED Course as of 01/25/24 1554   Thu Jan 25, 2024   1553 Discussed this with the patient that she has a pericardial effusion.  She states that she has had this in the past.  Dr. Driscoll states that patient had a echo in October and history of pericardial effusion.  He will see her in office next week for an echocardiogram [AJ]      ED Course User Index  [AJ] Jerica Huerta PA-C       Labs:    Lab Results (last 24 hours)       Procedure Component Value Units Date/Time    CBC & Differential [[id number]]  (Abnormal) Collected: 01/25/24 1112    Specimen: Blood from Arm, Right Updated: 01/25/24 1203    Narrative:      The following orders were created for panel order CBC & Differential.  Procedure                               Abnormality         Status                      ---------                               -----------         ------                     CBC Auto Differential[[id number]]        Abnormal            Final result                 Please view results for these tests on the individual orders.    Comprehensive Metabolic Panel [[id number]]  (Abnormal) Collected: 01/25/24 1112    Specimen: Blood from Arm, Right Updated: 01/25/24 1227     Glucose 104 mg/dL      BUN 18 mg/dL      Creatinine 0.83 mg/dL      Sodium 138 mmol/L      Potassium 4.4 mmol/L      Chloride 103 mmol/L      CO2 24.1 mmol/L      Calcium 9.5 mg/dL      Total Protein 6.9 g/dL      Albumin 4.1 g/dL      ALT (SGPT) 9 U/L      AST (SGOT) 12 U/L      Alkaline Phosphatase 70 U/L      Total Bilirubin 0.8 mg/dL      Globulin 2.8 gm/dL      A/G Ratio 1.5 g/dL      BUN/Creatinine Ratio 21.7     Anion Gap 10.9 mmol/L      eGFR 76.4 mL/min/1.73     Narrative:      GFR Normal >60  Chronic Kidney Disease <60  Kidney Failure <15      BNP [[id number]]  (Normal) Collected: 01/25/24 1112    Specimen: Blood from Arm, Right Updated: 01/25/24 1221     proBNP 749.6 pg/mL     Narrative:      This assay is used as an aid in the diagnosis of individuals suspected of having heart failure. It can be used as an aid in the diagnosis of acute decompensated heart failure (ADHF) in patients presenting with signs and symptoms of ADHF to the emergency department (ED). In addition, NT-proBNP of <300 pg/mL indicates ADHF is not likely.    Age Range Result Interpretation  NT-proBNP Concentration (pg/mL:      <50             Positive            >450                   Gray                 300-450                    Negative             <300    50-75           Positive            >900                  Gray                300-900                  Negative            <300      >75             Positive            >1800                  Gray                300-1800                  Negative            <300    Single High Sensitivity  Troponin T [[id number]]  (Normal) Collected: 01/25/24 1112    Specimen: Blood from Arm, Right Updated: 01/25/24 1227     HS Troponin T 9 ng/L     Narrative:      High Sensitive Troponin T Reference Range:  <14.0 ng/L- Negative Female for AMI  <22.0 ng/L- Negative Male for AMI  >=14 - Abnormal Female indicating possible myocardial injury.  >=22 - Abnormal Male indicating possible myocardial injury.   Clinicians would have to utilize clinical acumen, EKG, Troponin, and serial changes to determine if it is an Acute Myocardial Infarction or myocardial injury due to an underlying chronic condition.         CBC Auto Differential [[id number]]  (Abnormal) Collected: 01/25/24 1112    Specimen: Blood from Arm, Right Updated: 01/25/24 1203     WBC 5.86 10*3/mm3      RBC 3.82 10*6/mm3      Hemoglobin 12.7 g/dL      Hematocrit 38.6 %      .0 fL      MCH 33.2 pg      MCHC 32.9 g/dL      RDW 14.8 %      RDW-SD 55.2 fl      MPV 11.1 fL      Platelets 161 10*3/mm3      Neutrophil % 65.3 %      Lymphocyte % 20.8 %      Monocyte % 11.3 %      Eosinophil % 1.4 %      Basophil % 0.7 %      Immature Grans % 0.5 %      Neutrophils, Absolute 3.83 10*3/mm3      Lymphocytes, Absolute 1.22 10*3/mm3      Monocytes, Absolute 0.66 10*3/mm3      Eosinophils, Absolute 0.08 10*3/mm3      Basophils, Absolute 0.04 10*3/mm3      Immature Grans, Absolute 0.03 10*3/mm3      nRBC 0.0 /100 WBC     COVID-19, FLU A/B, RSV PCR 1 HR TAT - Swab, Nasopharynx [[id number]]  (Normal) Collected: 01/25/24 1238    Specimen: Swab from Nasopharynx Updated: 01/25/24 1343     COVID19 Not Detected     Influenza A PCR Not Detected     Influenza B PCR Not Detected     RSV, PCR Not Detected    Narrative:      Fact sheet for providers: https://www.fda.gov/media/925084/download    Fact sheet for patients: https://www.fda.gov/media/096990/download    Test performed by PCR.             Imaging:    CT Abdomen Pelvis With Contrast    Result Date: 1/25/2024  PROCEDURE: CT CHEST  W CONTRAST DIAGNOSTIC, 1/25/2024, 13:53 CT ABDOMEN PELVIS W CONTRAST, 1/25/2024, 13:53  COMPARISON:  None INDICATIONS: right sided cp wtih sob  TECHNIQUE: After obtaining the patient's consent, CT images were obtained with non-ionic intravenous contrast material.   PROTOCOL:   Pulmonary embolism imaging protocol performed    RADIATION:   DLP: 686.7mGy*cm   Automated exposure control was utilized to minimize radiation dose. CONTRAST: 100cc Isovue 370 I.V.  FINDINGS:  Chest:  There is mild emphysematous lung disease.  There is bronchiectasis in the lung bases and mild basilar scarring.  No significant pulmonary nodules, masses or acute infiltrates are present.  There is no significant hilar mediastinal lymphadenopathy.  There is enlargement of the central pulmonary arteries.  There are no pulmonary artery filling defects to suggest pulmonary embolism.  There is a moderate pericardial effusion present.  There is no pleural fluid present.  There are degenerative changes in the thoracic spine with mild scoliosis.  No destructive bone lesions are identified.  There is atherosclerotic disease with coronary artery calcification.  Abdomen and pelvis:  The liver and spleen appear unremarkable.  The gallbladder is surgically absent.  There is fatty replacement of the pancreas.  The adrenal glands appear normal.  The left kidney is normal.  There is renal cortical thinning in the upper pole the right kidney.  No stones are identified and there is no hydronephrosis or renal masses.  The appendix is normal.  There are no dilated or thickened loops of bowel.  There is extensive sigmoid diverticulosis with no evidence of diverticulitis.  The uterus is relatively atrophic.  No pelvic masses or fluid collections are identified.  The bladder is decompressed but appears normal.  There are postoperative changes of left total hip arthroplasty.  There is degenerative disc disease in the lumbar spine primarily at L5-S1.        1. Mild  emphysematous lung disease with bronchiectasis in the lung bases. 2. Moderate pericardial effusion. 3. Mild enlargement the central pulmonary arteries which may reflect some degree of pulmonary hypertension.  There is no evidence of pulmonary embolism. 4. Coronary atherosclerotic disease 5. Cholecystectomy 6. Colonic diverticulosis without diverticulitis 7. Status post left total hip arthroplasty     Laron Camejo MD       Electronically Signed and Approved By: Laron Camejo MD on 1/25/2024 at 15:02             CT Chest With Contrast Diagnostic    Result Date: 1/25/2024  PROCEDURE: CT CHEST W CONTRAST DIAGNOSTIC, 1/25/2024, 13:53 CT ABDOMEN PELVIS W CONTRAST, 1/25/2024, 13:53  COMPARISON:  None INDICATIONS: right sided cp wtih sob  TECHNIQUE: After obtaining the patient's consent, CT images were obtained with non-ionic intravenous contrast material.   PROTOCOL:   Pulmonary embolism imaging protocol performed    RADIATION:   DLP: 686.7mGy*cm   Automated exposure control was utilized to minimize radiation dose. CONTRAST: 100cc Isovue 370 I.V.  FINDINGS:  Chest:  There is mild emphysematous lung disease.  There is bronchiectasis in the lung bases and mild basilar scarring.  No significant pulmonary nodules, masses or acute infiltrates are present.  There is no significant hilar mediastinal lymphadenopathy.  There is enlargement of the central pulmonary arteries.  There are no pulmonary artery filling defects to suggest pulmonary embolism.  There is a moderate pericardial effusion present.  There is no pleural fluid present.  There are degenerative changes in the thoracic spine with mild scoliosis.  No destructive bone lesions are identified.  There is atherosclerotic disease with coronary artery calcification.  Abdomen and pelvis:  The liver and spleen appear unremarkable.  The gallbladder is surgically absent.  There is fatty replacement of the pancreas.  The adrenal glands appear normal.  The left kidney is normal.   There is renal cortical thinning in the upper pole the right kidney.  No stones are identified and there is no hydronephrosis or renal masses.  The appendix is normal.  There are no dilated or thickened loops of bowel.  There is extensive sigmoid diverticulosis with no evidence of diverticulitis.  The uterus is relatively atrophic.  No pelvic masses or fluid collections are identified.  The bladder is decompressed but appears normal.  There are postoperative changes of left total hip arthroplasty.  There is degenerative disc disease in the lumbar spine primarily at L5-S1.        1. Mild emphysematous lung disease with bronchiectasis in the lung bases. 2. Moderate pericardial effusion. 3. Mild enlargement the central pulmonary arteries which may reflect some degree of pulmonary hypertension.  There is no evidence of pulmonary embolism. 4. Coronary atherosclerotic disease 5. Cholecystectomy 6. Colonic diverticulosis without diverticulitis 7. Status post left total hip arthroplasty     Laron Camejo MD       Electronically Signed and Approved By: Laron Camejo MD on 1/25/2024 at 15:02             XR Chest 1 View    Result Date: 1/25/2024  PROCEDURE: XR CHEST 1 VW  COMPARISON: [facility], CR, XR CHEST 2 VW, 8/26/2022, 17:56.  [facility], CR, XR CHEST 2 VW, 9/06/2023, 11:50.  [facility], CR, XR CHEST 2 VW, 10/13/2023, 17:02.  INDICATIONS: SOA Triage Protocol  FINDINGS:  Heart size appears within normal limits.  Mediastinal contour appears grossly normal.  There is atherosclerosis of the aortic arch.  No definite new focal or diffuse pulmonary infiltrate is identified.  Previously seen right upper lobe opacities appear improved.  There is some mild left basilar opacities which appear chronic and may represent scarring.  No definite pneumothorax or effusion.        No definite radiographic findings of acute cardiopulmonary abnormality.       PRIMO SERRANO MD        Electronically Signed and Approved By: PRIMO SERRANO MD on 1/25/2024 at 11:42                Differential Diagnosis and Discussion:      Abdominal Pain: Based on the patient's signs and symptoms, I considered abdominal aortic aneurysm, small bowel obstruction, pancreatitis, acute cholecystitis, acute appendecitis, peptic ulcer disease, gastritis, colitis, endocrine disorders, irritable bowel syndrome and other differential diagnosis an etiology of the patient's abdominal pain.  Dyspnea: Differential diagnosis includes but is not limited to metabolic acidosis, neurological disorders, psychogenic, asthma, pneumothorax, upper airway obstruction, COPD, pneumonia, noncardiogenic pulmonary edema, interstitial lung disease, anemia, congestive heart failure, and pulmonary embolism    All labs were reviewed and interpreted by me.  All X-rays impressions were independently interpreted by me.  EKG was interpreted by me.  CT scan radiology impression was interpreted by me.    MDM     Amount and/or Complexity of Data Reviewed  Clinical lab tests: reviewed  Tests in the radiology section of CPT®: reviewed  Tests in the medicine section of CPT®: reviewed                 Patient Care Considerations:    ANTIBIOTICS: I considered prescribing antibiotics as an outpatient however no bacterial focus of infection was found.      Consultants/Shared Management Plan:    SHARED VISIT: I have discussed the case with my supervising physician, Dr. Ruelas who statesconsult with cardiology. The substantive portion of the medical decision was made by the attesting physician who made or approve the management plan and will take responsibility for the patient.  Clinical findings were discussed and ultimate disposition was made in consult with supervising physician.  Consultant: I have discussed the case with Dr. Vazquez, cardiologist who states patient can be discharged home and follow-up in office next week for echo cardiogram  I discussed this  with the patient  Social Determinants of Health:    Patient is independent, reliable, and has access to care.       Disposition and Care Coordination:    Discharged: The patient is suitable and stable for discharge with no need for consideration of admission.    I have explained the patient´s condition, diagnoses and treatment plan based on the information available to me at this time. I have answered questions and addressed any concerns. The patient has a good  understanding of the patient´s diagnosis, condition, and treatment plan as can be expected at this point. The vital signs have been stable. The patient´s condition is stable and appropriate for discharge from the emergency department.      The patient will pursue further outpatient evaluation with the primary care physician or other designated or consulting physician as outlined in the discharge instructions. They are agreeable to this plan of care and follow-up instructions have been explained in detail. The patient has received these instructions in written format and have expressed an understanding of the discharge instructions. The patient is aware that any significant change in condition or worsening of symptoms should prompt an immediate return to this or the closest emergency department or call to 911.  I have explained discharge medications and the need for follow up with the patient/caretakers. This was also printed in the discharge instructions. Patient was discharged with the following medications and follow up:      Medication List      No changes were made to your prescriptions during this visit.      Devora Johnson  [address]  [place] [id number] [phone]             Final diagnoses:   Pericardial effusion        ED Disposition       ED Disposition   Discharge    Condition   Stable    Comment   --               This medical record created using voice recognition software.             Jerica Huerta PA-C  01/25/24  [id number]

## 2024-01-26 ENCOUNTER — TELEPHONE (OUTPATIENT)
Dept: FAMILY MEDICINE CLINIC | Facility: CLINIC | Age: 69
End: 2024-01-26

## 2024-01-26 NOTE — TELEPHONE ENCOUNTER
Caller: Elysia Babcock    Relationship: Self    Best call back number: [phone]    Caller requesting test results: PATIENT     What test was performed: PHOEBE ONTIVEROS    When was the test performed: 01/25/2024    Where was the test performed:     Additional notes: PATIENT IS REQUESTING DR. LEE TO LOOK AT THE RESULTS AND CALL HER AND GO OVER THEM

## 2024-01-27 LAB
QT INTERVAL: 407 MS
QTC INTERVAL: 439 MS

## 2024-01-29 ENCOUNTER — PATIENT OUTREACH (OUTPATIENT)
Dept: CASE MANAGEMENT | Facility: OTHER | Age: 69
End: 2024-01-29
Payer: MEDICARE

## 2024-01-29 ENCOUNTER — TELEPHONE (OUTPATIENT)
Dept: CASE MANAGEMENT | Facility: OTHER | Age: 69
End: 2024-01-29
Payer: MEDICARE

## 2024-01-29 NOTE — TELEPHONE ENCOUNTER
I reviewed all labs and CT reports with her on case management call and she just wanted to be sure you had reviewed everything from her ER visit and they had not missed anything and reassure her of results.     She had some colonic diverticulosis not diverticulitis on abdominal CT and does see for colonoscopy in April already setup, she has her ECHO on 2/2/24 and cardiology follow up on 2/26/24 I am sure she just wanted reassurance.     Her pain stopped yesterday. I reviewed all labs and discussed no s/s of heart attack troponin was normal, no elevated BNP (she said had previously done Furosemide in past) so explained why that was not warranted.     I said I would review with you and if you needed anything else I would call her back.

## 2024-01-29 NOTE — OUTREACH NOTE
AMBULATORY CASE MANAGEMENT NOTE    Name and Relationship of Patient/Support Person: Elysia Babcock M - Self    Patient Outreach    Patient returned my call for Ccm and is not interested and well versed inh er chronic conditions. We discussed in detail all her ER testing from 1/25/24 and results see telephone note answered to PCP from today and I informed her if PCP had anything else to add to her and my discussion I would call her back. She was appreciative of the support and review of testing and why she was not put on any medications. Placed in [facility] program for nursing support at this time.     Education Documentation  No documentation found.        Genny BOOTHE  Ambulatory Case Management    1/29/2024, 16:35 EST

## 2024-01-29 NOTE — TELEPHONE ENCOUNTER
Attempted to reach for CCM services for ER visit 1/26/24 for ER/Admit risk 67%, left detailed message for call back from patient if interested in nurse . First attempt to reach.

## 2024-01-30 ENCOUNTER — TELEPHONE (OUTPATIENT)
Dept: CASE MANAGEMENT | Facility: OTHER | Age: 69
End: 2024-01-30
Payer: MEDICARE

## 2024-01-30 NOTE — TELEPHONE ENCOUNTER
Noted telephone note to PCP about ER testing, etc not looked at so I rerouted to clinical pool to review.

## 2024-02-02 ENCOUNTER — TELEPHONE (OUTPATIENT)
Dept: CASE MANAGEMENT | Facility: OTHER | Age: 69
End: 2024-02-02
Payer: MEDICARE

## 2024-02-02 ENCOUNTER — HOSPITAL ENCOUNTER (OUTPATIENT)
Dept: CARDIOLOGY | Facility: HOSPITAL | Age: 69
Discharge: HOME | End: 2024-02-02
Payer: MEDICARE

## 2024-02-02 DIAGNOSIS — I31.39 PERICARDIAL EFFUSION: ICD-10-CM

## 2024-02-02 LAB
ASCENDING AORTA: 3.2 CM
BH CV ECHO MEAS - AO MAX PG: 12 MMHG
BH CV ECHO MEAS - AO MEAN PG: 8 MMHG
BH CV ECHO MEAS - AO ROOT DIAM: 3.2 CM
BH CV ECHO MEAS - AO V2 MAX: 175 CM/SEC
BH CV ECHO MEAS - AO V2 VTI: 49.7 CM
BH CV ECHO MEAS - AVA(I,D): 2.32 CM2
BH CV ECHO MEAS - EDV(CUBED): 46.7 ML
BH CV ECHO MEAS - EDV(MOD-SP2): 90.3 ML
BH CV ECHO MEAS - EDV(MOD-SP4): 86 ML
BH CV ECHO MEAS - EF(MOD-BP): 70.6 %
BH CV ECHO MEAS - EF(MOD-SP2): 68.4 %
BH CV ECHO MEAS - EF(MOD-SP4): 69.7 %
BH CV ECHO MEAS - ESV(CUBED): 4.9 ML
BH CV ECHO MEAS - ESV(MOD-SP2): 28.5 ML
BH CV ECHO MEAS - ESV(MOD-SP4): 26.1 ML
BH CV ECHO MEAS - FS: 52.8 %
BH CV ECHO MEAS - IVS/LVPW: 1.25 CM
BH CV ECHO MEAS - IVSD: 1.5 CM
BH CV ECHO MEAS - LA DIMENSION: 4.1 CM
BH CV ECHO MEAS - LAT PEAK E' VEL: 5.9 CM/SEC
BH CV ECHO MEAS - LV DIASTOLIC VOL/BSA (35-75): 42.2 CM2
BH CV ECHO MEAS - LV MASS(C)D: 169.8 GRAMS
BH CV ECHO MEAS - LV MAX PG: 7.6 MMHG
BH CV ECHO MEAS - LV MEAN PG: 4 MMHG
BH CV ECHO MEAS - LV SYSTOLIC VOL/BSA (12-30): 12.8 CM2
BH CV ECHO MEAS - LV V1 MAX: 138 CM/SEC
BH CV ECHO MEAS - LV V1 VTI: 36.7 CM
BH CV ECHO MEAS - LVIDD: 3.6 CM
BH CV ECHO MEAS - LVIDS: 1.7 CM
BH CV ECHO MEAS - LVOT AREA: 3.1 CM2
BH CV ECHO MEAS - LVOT DIAM: 2 CM
BH CV ECHO MEAS - LVPWD: 1.2 CM
BH CV ECHO MEAS - MED PEAK E' VEL: 6.2 CM/SEC
BH CV ECHO MEAS - MV A MAX VEL: 111 CM/SEC
BH CV ECHO MEAS - MV DEC SLOPE: 413 CM/SEC2
BH CV ECHO MEAS - MV DEC TIME: 0.34 SEC
BH CV ECHO MEAS - MV E MAX VEL: 146 CM/SEC
BH CV ECHO MEAS - MV E/A: 1.32
BH CV ECHO MEAS - MV MAX PG: 8.3 MMHG
BH CV ECHO MEAS - MV MEAN PG: 3 MMHG
BH CV ECHO MEAS - MV P1/2T: 105.7 MSEC
BH CV ECHO MEAS - MV V2 VTI: 69 CM
BH CV ECHO MEAS - MVA(P1/2T): 2.08 CM2
BH CV ECHO MEAS - MVA(VTI): 1.67 CM2
BH CV ECHO MEAS - RVDD: 2.3 CM
BH CV ECHO MEAS - SI(MOD-SP2): 30.3 ML/M2
BH CV ECHO MEAS - SI(MOD-SP4): 29.4 ML/M2
BH CV ECHO MEAS - SV(LVOT): 115.3 ML
BH CV ECHO MEAS - SV(MOD-SP2): 61.8 ML
BH CV ECHO MEAS - SV(MOD-SP4): 59.9 ML
BH CV ECHO MEASUREMENTS AVERAGE E/E' RATIO: 24.13
LEFT ATRIUM VOLUME INDEX: 44.9 ML/M2

## 2024-02-02 PROCEDURE — 93306 TTE W/DOPPLER COMPLETE: CPT

## 2024-02-02 NOTE — PROGRESS NOTES
Echocardiogram showed a tiny amount of fluid around the heart, which is unchanged from previous studies.  It is not causing compression of heart muscles.  Other findings are unchanged from previous study.    We will discuss in detail during follow-up visit later this month.      Electronically signed by Matthew Vazquez MD, 02/02/24, 6:42 PM EST.

## 2024-02-02 NOTE — TELEPHONE ENCOUNTER
Outreached to PCP about CT results and asked to review and let me know if I need to address further with her. Waiting response from PCP.

## 2024-02-05 ENCOUNTER — TELEPHONE (OUTPATIENT)
Dept: CASE MANAGEMENT | Facility: OTHER | Age: 69
End: 2024-02-05
Payer: MEDICARE

## 2024-02-05 ENCOUNTER — PATIENT MESSAGE (OUTPATIENT)
Dept: CARDIOLOGY | Facility: CLINIC | Age: 69
End: 2024-02-05
Payer: MEDICARE

## 2024-02-05 NOTE — TELEPHONE ENCOUNTER
Provider has reviewed and responded to ER reports and office staff has called patient back. No other needs.

## 2024-02-05 NOTE — TELEPHONE ENCOUNTER
I reviewed labs and CT scans. I did not see anything overly concerning. I do encourage that she follow-up with cardiology later this month.

## 2024-02-26 ENCOUNTER — OFFICE VISIT (OUTPATIENT)
Dept: CARDIOLOGY | Facility: CLINIC | Age: 69
End: 2024-02-26
Payer: MEDICARE

## 2024-02-26 VITALS
WEIGHT: 220 LBS | DIASTOLIC BLOOD PRESSURE: 64 MMHG | HEIGHT: 66 IN | SYSTOLIC BLOOD PRESSURE: 148 MMHG | BODY MASS INDEX: 35.36 KG/M2 | HEART RATE: 48 BPM

## 2024-02-26 DIAGNOSIS — I10 PRIMARY HYPERTENSION: Chronic | ICD-10-CM

## 2024-02-26 DIAGNOSIS — I42.2 HYPERTROPHIC CARDIOMYOPATHY: Primary | ICD-10-CM

## 2024-02-26 DIAGNOSIS — I25.10 CORONARY ARTERY DISEASE INVOLVING NATIVE CORONARY ARTERY OF NATIVE HEART WITHOUT ANGINA PECTORIS: ICD-10-CM

## 2024-02-26 PROBLEM — I31.39 PERICARDIAL EFFUSION: Status: ACTIVE | Noted: 2024-02-26

## 2024-02-26 PROCEDURE — 99214 OFFICE O/P EST MOD 30 MIN: CPT | Performed by: FAMILY MEDICINE

## 2024-02-26 PROCEDURE — 1160F RVW MEDS BY RX/DR IN RCRD: CPT | Performed by: FAMILY MEDICINE

## 2024-02-26 PROCEDURE — 3078F DIAST BP <80 MM HG: CPT | Performed by: FAMILY MEDICINE

## 2024-02-26 PROCEDURE — 1159F MED LIST DOCD IN RCRD: CPT | Performed by: FAMILY MEDICINE

## 2024-02-26 PROCEDURE — 3077F SYST BP >= 140 MM HG: CPT | Performed by: FAMILY MEDICINE

## 2024-02-26 RX ORDER — AMOXICILLIN 875 MG/1
875 TABLET, COATED ORAL EVERY 12 HOURS SCHEDULED
COMMUNITY
Start: 2024-02-20

## 2024-02-26 RX ORDER — METOPROLOL TARTRATE 50 MG/1
50 TABLET, FILM COATED ORAL 2 TIMES DAILY
Qty: 180 TABLET | Refills: 3 | Status: SHIPPED | OUTPATIENT
Start: 2024-02-26

## 2024-02-26 NOTE — ASSESSMENT & PLAN NOTE
Recent echocardiogram  consistent with nonobstructive hypertrophic cardiomyopathy.  She does have audible systolic murmur.  Recent echocardiogram also had findings trivial pericardial effusion without any evidence of tamponade.  Will continue current dose of metoprolol, refill sent.

## 2024-02-26 NOTE — PROGRESS NOTES
Chief Complaint  Follow-up, Coronary Artery Disease, Hypertension, and Hyperlipidemia    Subjective        History of Present Illness  Elysia Babcock presents to [facility]   Ms. Babcock is a 68-year-old female patient coming in for routine cardiac follow-up.  She had an ER evaluation on  with complaints of pain with inspiration and abdominal pain.  CT was negative for PE, cardiac markers were negative, however there was evidence of pericardial effusion on CT, and she underwent echocardiogram which shows very trivial pericardial effusion.  She reports within a few days of being in the ER her symptoms of pain with deep breathing resolved, she has not had any further recurrence.  Denies any episodes of chest pains, palpitations, lightheadedness or dizziness.    Past History:     Coronary artery disease : Status post angioplasty and stent placement to mid LAD artery (2.75x26 resolute) on 2015 at [facility] in [place].  LCx and RCA had no significant lesions.  Repeat cardiac cath done on 12/3/2019 showed patent stent.  There is 50% stenosis of the ostium of the first diagonal branch.  SPECT study done on 2021 did not show any ischemia     Hypertrophic cardiomyopathy.  Possible S.A.M. and subaortic obstruction with elevated velocity LVOT per echocardiogram done on 2021.  12 mm mean gradient     Chronic diastolic heart failure     Essential hypertension  Next hyperlipidemia    Past Medical History:   Diagnosis Date    Allergic     Arthritis     Asthma     CHF (congestive heart failure)     Clotting disorder     Congenital heart disease     Coronary artery disease     Depression     Heart murmur     Hyperlipidemia     Hypertension     Obesity     Pneumonia 2023    Visual impairment        No Known Allergies     Past Surgical History:   Procedure Laterality Date     SECTION      CHOLECYSTECTOMY      COLONOSCOPY      CORONARY STENT  PLACEMENT      HIP BIPOLAR REPLACEMENT      JOINT REPLACEMENT  2021    TONSILLECTOMY  1961        Social History  She  reports that she has never smoked. She has never been exposed to tobacco smoke. She has never used smokeless tobacco. She reports current alcohol use of about 6.0 standard drinks of alcohol per week. She reports that she does not use drugs.    Family History  Her family history includes Asthma in her mother; Depression in her mother; Heart disease in her father; Hyperlipidemia in her mother.       Current Outpatient Medications on File Prior to Visit   Medication Sig    albuterol sulfate  (90 Base) MCG/ACT inhaler Inhale 2 puffs Every 4 (Four) Hours As Needed for Wheezing.    alendronate (Fosamax) 70 MG tablet Take 1 tablet by mouth Every 7 (Seven) Days.    amoxicillin (AMOXIL) 875 MG tablet Take 1 tablet by mouth Every 12 (Twelve) Hours.    aspirin 81 MG EC tablet Take 1 tablet by mouth Daily.    B Complex Vitamins (VITAMIN B COMPLEX PO) Take  by mouth.    FLUoxetine (PROzac) 40 MG capsule     ibuprofen (ADVIL,MOTRIN) 400 MG tablet Take 1 tablet by mouth Every 6 (Six) Hours As Needed for Mild Pain.    ipratropium-albuterol (DUO-NEB) 0.5-2.5 mg/3 ml nebulizer USE 3 ML VIA NEBULIZER EVERY 4 HOURS AS NEEDED FOR WHEEZING    vitamin D3 125 MCG (5000 UT) capsule capsule Take 1 capsule by mouth Daily.    [DISCONTINUED] metoprolol tartrate (LOPRESSOR) 50 MG tablet Take 1 tablet by mouth 2 (Two) Times a Day.    [DISCONTINUED] buPROPion XL (Wellbutrin XL) 150 MG 24 hr tablet Take 1 tablet by mouth Daily. (Patient not taking: Reported on 2/26/2024)     No current facility-administered medications on file prior to visit.         Review of Systems   Constitutional:  Negative for fatigue.   Respiratory:  Negative for cough, chest tightness and shortness of breath.    Cardiovascular:  Negative for chest pain, palpitations and leg swelling.   Gastrointestinal:  Negative for nausea and vomiting.  "  Neurological:  Negative for dizziness and syncope.        Objective   Vitals:    02/26/24 1309 02/26/24 1313 02/26/24 1330   BP: 173/54 166/62 148/64   Pulse: (!) 48     Weight: 99.8 kg (220 lb)     Height: 167.6 cm (66\")           Physical Exam  General : Alert, awake, no acute distress  Neck : Supple, no carotid bruit, no jugular venous distention  CVS : Regular rate and rhythm,2/6 systolic murmur  Lungs: Clear to auscultation bilaterally, no crackles or rhonchi  Abdomen: Soft, nontender, bowel sounds active  Extremities: Warm, well-perfused, no pedal edema      Result Review     The following data was reviewed by JERSON Zhang  proBNP   Date Value Ref Range Status   01/25/2024 749.6 0.0 - 900.0 pg/mL Final     CMP          6/5/2023    09:35 10/13/2023    16:34 1/25/2024    11:12   CMP   Glucose 95   104    BUN 22   18    Creatinine 0.89   0.83    EGFR 70.7   76.4    Sodium 139   138    Potassium 4.7  4.1  4.4    Chloride 104   103    Calcium 9.4   9.5    Total Protein 6.7   6.9    Albumin 4.4   4.1    Globulin 2.3   2.8    Total Bilirubin 0.6   0.8    Alkaline Phosphatase 61   70    AST (SGOT) 22   12    ALT (SGPT) 18   9    Albumin/Globulin Ratio 1.9   1.5    BUN/Creatinine Ratio 24.7   21.7    Anion Gap 7.0   10.9      CBC w/diff          6/5/2023    09:35 10/13/2023    16:34 1/25/2024    11:12   CBC w/Diff   WBC 7.55  6.91  5.86    RBC 4.46  3.71  3.82    Hemoglobin 14.0  11.8  12.7    Hematocrit 41.6  34.9  38.6    MCV 93.3  94.1  101.0    MCH 31.4  31.8  33.2    MCHC 33.7  33.8  32.9    RDW 13.5  13.6  14.8    Platelets 156  117  161    Neutrophil Rel % 65.5  65.1  65.3    Immature Granulocyte Rel % 0.3  0.3  0.5    Lymphocyte Rel % 20.4  19.7  20.8    Monocyte Rel % 11.5  10.3  11.3    Eosinophil Rel % 1.9  4.2  1.4    Basophil Rel % 0.4  0.4  0.7       Lab Results   Component Value Date    TSH 2.250 06/05/2023      Lab Results   Component Value Date    FREET4 1.18 06/05/2023        Lab Results "   Component Value Date    TROPONINT 9 01/25/2024           Lipid Panel          6/5/2023    09:35   Lipid Panel   Total Cholesterol 230    Triglycerides 62    HDL Cholesterol 69    VLDL Cholesterol 11    LDL Cholesterol  150    LDL/HDL Ratio 2.15        Results for orders placed during the hospital encounter of 02/02/24    Adult Transthoracic Echo Complete W/ Cont if Necessary Per Protocol    Interpretation Summary    Left ventricular systolic function is normal. Left ventricular ejection fraction appears to be 66 - 70%.    There is moderate asymmetric basilar septal hypertrophy the left ventricle with turbulence of flow.  The peak LVOT gradient with Valsalva is 72 mmHg.The findings are consistent with non-obstructive, hypertrophic cardiomyopathy.    There are no significant valvular abnormalities.    There is a trivial pericardial effusion, unchanged from previous echocardiogram.  There is no evidence of cardiac tamponade.             Assessment and Plan   Diagnoses and all orders for this visit:    1. Hypertrophic cardiomyopathy (Primary)  Assessment & Plan:  Recent echocardiogram  consistent with nonobstructive hypertrophic cardiomyopathy.  She does have audible systolic murmur.  Recent echocardiogram also had findings trivial pericardial effusion without any evidence of tamponade.  Will continue current dose of metoprolol, refill sent.          2. Primary hypertension  Assessment & Plan:  Blood pressure is reasonably well-controlled, continue current dose metoprolol.  Chemistry panel shows normal electrolyte and renal function.    Orders:  -     metoprolol tartrate (LOPRESSOR) 50 MG tablet; Take 1 tablet by mouth 2 (Two) Times a Day.  Dispense: 180 tablet; Refill: 3    3. Coronary artery disease involving native coronary artery of native heart without angina pectoris  Assessment & Plan:  She is stable without symptoms of angina.  Continue daily aspirin and beta-blockers.  Unclear if she is actually taking statin  therapy at this time.              Follow Up   Return in about 6 months (around 8/26/2024) for with Dr. Vazquez.    Patient was given instructions and counseling regarding her condition or for health maintenance advice. Please see specific information pulled into the AVS if appropriate.     Signed,  Deena Casarez, APRN  02/26/2024     Dictated Utilizing Dragon Dictation: Please note that portions of this note were completed with a voice recognition program.  Part of this note may be an electronic transcription/translation of spoken language to printed text using the Dragon Dictation System.

## 2024-02-26 NOTE — ASSESSMENT & PLAN NOTE
Blood pressure is reasonably well-controlled, continue current dose metoprolol.  Chemistry panel shows normal electrolyte and renal function.

## 2024-02-26 NOTE — ASSESSMENT & PLAN NOTE
She is stable without symptoms of angina.  Continue daily aspirin and beta-blockers.  Unclear if she is actually taking statin therapy at this time.

## 2024-03-05 ENCOUNTER — TELEPHONE (OUTPATIENT)
Dept: CASE MANAGEMENT | Facility: OTHER | Age: 69
End: 2024-03-05
Payer: MEDICARE

## 2024-03-05 NOTE — TELEPHONE ENCOUNTER
Per chart review, since my last outreach she has had some further testing and follow up with cardiology and doing well with no other c/o pain and all reports on testing are good. No need for follow up call and closing HRCM for goals met.

## 2024-04-16 ENCOUNTER — OFFICE VISIT (OUTPATIENT)
Dept: SURGERY | Facility: CLINIC | Age: 69
End: 2024-04-16
Payer: MEDICARE

## 2024-04-16 ENCOUNTER — PREP FOR SURGERY (OUTPATIENT)
Dept: OTHER | Facility: HOSPITAL | Age: 69
End: 2024-04-16
Payer: MEDICARE

## 2024-04-16 VITALS
BODY MASS INDEX: 35.42 KG/M2 | SYSTOLIC BLOOD PRESSURE: 150 MMHG | WEIGHT: 220.4 LBS | HEIGHT: 66 IN | DIASTOLIC BLOOD PRESSURE: 86 MMHG | HEART RATE: 63 BPM

## 2024-04-16 DIAGNOSIS — Z86.010 HISTORY OF COLONIC POLYPS: ICD-10-CM

## 2024-04-16 DIAGNOSIS — Z12.11 SCREENING FOR MALIGNANT NEOPLASM OF COLON: Primary | ICD-10-CM

## 2024-04-16 PROCEDURE — 3077F SYST BP >= 140 MM HG: CPT | Performed by: NURSE PRACTITIONER

## 2024-04-16 PROCEDURE — 1159F MED LIST DOCD IN RCRD: CPT | Performed by: NURSE PRACTITIONER

## 2024-04-16 PROCEDURE — S0260 H&P FOR SURGERY: HCPCS | Performed by: NURSE PRACTITIONER

## 2024-04-16 PROCEDURE — 3079F DIAST BP 80-89 MM HG: CPT | Performed by: NURSE PRACTITIONER

## 2024-04-16 PROCEDURE — 1160F RVW MEDS BY RX/DR IN RCRD: CPT | Performed by: NURSE PRACTITIONER

## 2024-04-16 RX ORDER — SODIUM CHLORIDE 0.9 % (FLUSH) 0.9 %
10 SYRINGE (ML) INJECTION AS NEEDED
OUTPATIENT
Start: 2024-04-16

## 2024-04-16 RX ORDER — SODIUM CHLORIDE 0.9 % (FLUSH) 0.9 %
3 SYRINGE (ML) INJECTION EVERY 12 HOURS SCHEDULED
OUTPATIENT
Start: 2024-04-16

## 2024-04-16 RX ORDER — SODIUM CHLORIDE 9 MG/ML
40 INJECTION, SOLUTION INTRAVENOUS AS NEEDED
OUTPATIENT
Start: 2024-04-16

## 2024-04-16 RX ORDER — FUROSEMIDE 20 MG/1
20 TABLET ORAL DAILY
COMMUNITY
End: 2024-04-17

## 2024-04-16 RX ORDER — SODIUM, POTASSIUM,MAG SULFATES 17.5-3.13G
SOLUTION, RECONSTITUTED, ORAL ORAL
Qty: 354 ML | Refills: 0 | Status: SHIPPED | OUTPATIENT
Start: 2024-04-16

## 2024-04-16 NOTE — PROGRESS NOTES
Chief Complaint: Colonoscopy (NO COMPLAINTS.)    Subjective      Colonoscopy consultation       History of Present Illness  Elysia Babcock is a 68 y.o. female presents to [facility] for colonoscopy consultation.     Patient presents today on referral from Dr. Devora Johnson for colonoscopy consultation.  Patient denies any abdominal pain, change in bowel habit, or rectal bleeding.  Denies any family history of colorectal cancer.  Patient reports last colonoscopy was in her 90s and had some polyps removed.    Patient denies ALFONZO.  Denies any cardiac issues.  Denies taking a GLP-1 receptors      Objective     Past Medical History:   Diagnosis Date    Allergic     Arthritis     Asthma     CHF (congestive heart failure)     Clotting disorder     Congenital heart disease     Coronary artery disease     Depression     Heart murmur     Hyperlipidemia     Hypertension     Obesity     Pneumonia 2023    Visual impairment        Past Surgical History:   Procedure Laterality Date     SECTION      CHOLECYSTECTOMY      COLONOSCOPY      CORONARY STENT PLACEMENT      HIP BIPOLAR REPLACEMENT      JOINT REPLACEMENT      TONSILLECTOMY         Outpatient Medications Marked as Taking for the 24 encounter (Office Visit) with Greg    Medication Sig Dispense Refill    albuterol sulfate  (90 Base) MCG/ACT inhaler Inhale 2 puffs Every 4 (Four) Hours As Needed for Wheezing. 18 g 0    alendronate (Fosamax) 70 MG tablet Take 1 tablet by mouth Every 7 (Seven) Days.      aspirin 81 MG EC tablet Take 1 tablet by mouth Daily.      FLUoxetine (PROzac) 40 MG capsule       furosemide (LASIX) 20 MG tablet Take 1 tablet by mouth Daily.      ibuprofen (ADVIL,MOTRIN) 400 MG tablet Take 1 tablet by mouth Every 6 (Six) Hours As Needed for Mild Pain.      ipratropium-albuterol (DUO-NEB) 0.5-2.5 mg/3 ml nebulizer USE 3 ML VIA NEBULIZER EVERY 4 HOURS AS NEEDED FOR WHEEZING    "   metoprolol tartrate (LOPRESSOR) 50 MG tablet Take 1 tablet by mouth 2 (Two) Times a Day. 180 tablet 3    vitamin D3 125 MCG (5000 UT) capsule capsule Take 1 capsule by mouth Daily.         No Known Allergies     Family History   Problem Relation Age of Onset    Asthma Mother     Depression Mother     Hyperlipidemia Mother     Heart disease Father        Social History     Socioeconomic History    Marital status:    Tobacco Use    Smoking status: Never     Passive exposure: Never    Smokeless tobacco: Never    Tobacco comments:     No second hand smoke exposure    Vaping Use    Vaping status: Never Used   Substance and Sexual Activity    Alcohol use: Yes     Alcohol/week: 6.0 standard drinks of alcohol     Types: 3 Glasses of wine, 3 Drinks containing 0.5 oz of alcohol per week     Comment: I enjoy happy hour.    Drug use: Never    Sexual activity: Not Currently     Partners: Male     Birth control/protection: None     Comment: I have been off the pill for 25 years.       Review of Systems   Constitutional:  Negative for chills and fever.   Gastrointestinal:  Negative for abdominal distention, abdominal pain, anal bleeding, blood in stool, constipation, diarrhea and rectal pain.        Vital Signs:   /86 (BP Location: Left arm, Patient Position: Sitting, Cuff Size: Adult)   Pulse 63   Ht 167.6 cm (66\")   Wt 100 kg (220 lb 6.4 oz)   BMI 35.57 kg/m²      Physical Exam  Vitals and nursing note reviewed.   Constitutional:       General: She is not in acute distress.     Appearance: Normal appearance.   HENT:      Head: Normocephalic.   Cardiovascular:      Rate and Rhythm: Normal rate.   Pulmonary:      Effort: Pulmonary effort is normal.      Breath sounds: No stridor.   Abdominal:      General: Abdomen is flat.      Palpations: Abdomen is soft.      Tenderness: There is no guarding.   Musculoskeletal:         General: No deformity. Normal range of motion.   Skin:     General: Skin is warm and dry. "      Coloration: Skin is not jaundiced.   Neurological:      General: No focal deficit present.      Mental Status: She is alert and oriented to person, place, and time.   Psychiatric:         Mood and Affect: Mood normal.         Thought Content: Thought content normal.          Result Review :          []  Laboratory  []  Radiology  []  Pathology  []  Microbiology  []  EKG/Telemetry   []  Cardiology/Vascular   []  Old records  I spent 15 minutes caring for Elysia on this date of service. This time includes time spent by me in the following activities: reviewing tests, obtaining and/or reviewing a separately obtained history, performing a medically appropriate examination and/or evaluation, ordering medications, tests, or procedures, and documenting information in the medical record.     Assessment and Plan    Diagnoses and all orders for this visit:    1. Screening for malignant neoplasm of colon (Primary)    2. History of colonic polyps    Other orders  -     sodium-potassium-magnesium sulfates (Suprep Bowel Prep Kit) 17.5-3.13-1.6 GM/177ML solution oral solution; Take as directed.  Instructions given in office.  Dispense: 2 bottles  Dispense: 354 mL; Refill: 0        Follow Up   Return for Schedule colonoscopy with Dr. Mcknight on 6/6/2024 [facility], [place], operated by [facility].    Hospital arrival time:0700    Possible risks/complications, benefits, and alternatives to surgical or invasive procedures have been explained to patient and/or legal guardian.    Patient has been evaluated and can tolerate anesthesia and/or sedation. Risks, benefits, and alternatives to anesthesia and sedation have been explained to the patient and/or legal guardian. Patient verbalizes understanding and is willing to proceed with the above plan.     Patient was given instructions and counseling regarding her condition or for health maintenance advice. Please see specific information pulled into the AVS if appropriate.

## 2024-04-17 ENCOUNTER — HOSPITAL ENCOUNTER (OUTPATIENT)
Dept: GENERAL RADIOLOGY | Facility: HOSPITAL | Age: 69
Discharge: HOME | End: 2024-04-17
Payer: MEDICARE

## 2024-04-17 ENCOUNTER — OFFICE VISIT (OUTPATIENT)
Dept: FAMILY MEDICINE CLINIC | Facility: CLINIC | Age: 69
End: 2024-04-17
Payer: MEDICARE

## 2024-04-17 VITALS
DIASTOLIC BLOOD PRESSURE: 65 MMHG | HEIGHT: 66 IN | HEART RATE: 74 BPM | OXYGEN SATURATION: 94 % | SYSTOLIC BLOOD PRESSURE: 128 MMHG | TEMPERATURE: 98 F | BODY MASS INDEX: 36.03 KG/M2 | WEIGHT: 224.2 LBS

## 2024-04-17 DIAGNOSIS — J18.9 PNEUMONIA OF RIGHT LOWER LOBE DUE TO INFECTIOUS ORGANISM: ICD-10-CM

## 2024-04-17 DIAGNOSIS — R06.89 TROUBLE BREATHING: Primary | ICD-10-CM

## 2024-04-17 DIAGNOSIS — I42.2 HYPERTROPHIC CARDIOMYOPATHY: ICD-10-CM

## 2024-04-17 DIAGNOSIS — R07.89 OTHER CHEST PAIN: ICD-10-CM

## 2024-04-17 DIAGNOSIS — R01.1 SYSTOLIC MURMUR: ICD-10-CM

## 2024-04-17 DIAGNOSIS — R04.2 HEMOPTYSIS: ICD-10-CM

## 2024-04-17 DIAGNOSIS — R06.89 TROUBLE BREATHING: ICD-10-CM

## 2024-04-17 PROCEDURE — 3074F SYST BP LT 130 MM HG: CPT

## 2024-04-17 PROCEDURE — 71046 X-RAY EXAM CHEST 2 VIEWS: CPT

## 2024-04-17 PROCEDURE — 99214 OFFICE O/P EST MOD 30 MIN: CPT

## 2024-04-17 PROCEDURE — 3078F DIAST BP <80 MM HG: CPT

## 2024-04-17 RX ORDER — FUROSEMIDE 40 MG/1
40 TABLET ORAL DAILY
Qty: 7 TABLET | Refills: 0 | Status: SHIPPED | OUTPATIENT
Start: 2024-04-17 | End: 2024-04-24

## 2024-04-17 RX ORDER — AZITHROMYCIN 250 MG/1
TABLET, FILM COATED ORAL
Qty: 6 TABLET | Refills: 0 | Status: SHIPPED | OUTPATIENT
Start: 2024-04-17

## 2024-04-17 NOTE — PROGRESS NOTES
"Chief Complaint  Shortness of Breath (X1wk, taking furosemide the last 5days), Cough (Last night was coughing up a little bit of blood, and has a lot of phlegm in throat ), and Back Pain (Upper back between shoulder blades )    Subjective        Elysia Babcock presents to [facility]  History of Present Illness    Objective   Vital Signs:  There were no vitals taken for this visit.  Estimated body mass index is 35.57 kg/m² as calculated from the following:    Height as of 4/16/24: 167.6 cm (66\").    Weight as of 4/16/24: 100 kg (220 lb 6.4 oz).               Physical Exam   Result Review :                     Assessment and Plan     Diagnoses and all orders for this visit:    1. Trouble breathing (Primary)  -     XR Chest 2 View; Future             Follow Up     No follow-ups on file.  Patient was given instructions and counseling regarding her condition or for health maintenance advice. Please see specific information pulled into the AVS if appropriate.         "

## 2024-04-17 NOTE — PROGRESS NOTES
Chief Complaint   Patient presents with    Shortness of Breath     X1wk, taking furosemide the last 5days    Cough     Last night was coughing up a little bit of blood, and has a lot of phlegm in throat     Back Pain     Upper back between shoulder blades        Subjective          Elysia Babcock presents to [facility]    History of Present Illness  Elysia is here to be seen for shortness of breath for 1 week. She had taken furosemide for the last 5 days and has not improved. She has had a cough and has been coughing up a little bit of blood. She has a lot of phlegm in her throat. She also complaints of upper back pain between her shoulder blades.       Past History:  Medical History: has a past medical history of Allergic, Arthritis, Asthma, CHF (congestive heart failure), Clotting disorder, Congenital heart disease, Coronary artery disease (), Depression (), Heart murmur, Hyperlipidemia, Hypertension, Obesity, Pneumonia (2023), and Visual impairment.   Surgical History: has a past surgical history that includes Hip Bipolar; Cholecystectomy; Coronary stent placement;  section; Joint replacement (); Tonsillectomy (); and Colonoscopy.   Family History: family history includes Asthma in her mother; Depression in her mother; Heart disease in her father; Hyperlipidemia in her mother.   Social History: reports that she has never smoked. She has never been exposed to tobacco smoke. She has never used smokeless tobacco. She reports current alcohol use of about 6.0 standard drinks of alcohol per week. She reports that she does not use drugs.  Allergies: Patient has no known allergies.  (Not in a hospital admission)       Social History     Socioeconomic History    Marital status:    Tobacco Use    Smoking status: Never     Passive exposure: Never    Smokeless tobacco: Never    Tobacco comments:     No second hand smoke exposure    Vaping Use    Vaping  "status: Never Used   Substance and Sexual Activity    Alcohol use: Yes     Alcohol/week: 6.0 standard drinks of alcohol     Types: 3 Glasses of wine, 3 Drinks containing 0.5 oz of alcohol per week     Comment: I enjoy happy hour.    Drug use: Never    Sexual activity: Not Currently     Partners: Male     Birth control/protection: None     Comment: I have been off the pill for 25 years.       Health Maintenance Due   Topic Date Due    ANNUAL WELLNESS VISIT  Never done       Objective     Vital Signs:   /65 (BP Location: Left arm, Patient Position: Sitting, Cuff Size: Adult)   Pulse 74   Temp 98 °F (36.7 °C)   Ht 167.6 cm (66\")   Wt 102 kg (224 lb 3.2 oz)   SpO2 94%   BMI 36.19 kg/m²       Physical Exam  Constitutional:       Appearance: Normal appearance.   Cardiovascular:      Rate and Rhythm: Normal rate and regular rhythm.      Pulses: Normal pulses.      Heart sounds: Normal heart sounds.   Pulmonary:      Effort: Pulmonary effort is normal.      Breath sounds: Normal breath sounds.   Neurological:      General: No focal deficit present.      Mental Status: She is alert and oriented to person, place, and time.   Psychiatric:         Mood and Affect: Mood normal.         Behavior: Behavior normal.          Review of Systems   Respiratory:  Positive for cough, chest tightness and shortness of breath.    All other systems reviewed and are negative.       Result Review :                 Assessment and Plan    Diagnoses and all orders for this visit:    1. Trouble breathing (Primary)  -     XR Chest 2 View; Future  -     CT Chest With Contrast; Future    2. Hemoptysis  -     CT Chest With Contrast; Future    3. Systolic murmur  -     CT Chest With Contrast; Future    4. Other chest pain  -     CT Chest With Contrast; Future    5. Hypertrophic cardiomyopathy    6. Pneumonia of right lower lobe due to infectious organism  -     cefdinir (OMNICEF) 300 MG capsule; Take 1 capsule by mouth 2 (Two) Times a Day.  " Dispense: 14 capsule; Refill: 0    Other orders  -     furosemide (Lasix) 40 MG tablet; Take 1 tablet by mouth Daily for 7 days.  Dispense: 7 tablet; Refill: 0  -     azithromycin (Zithromax Z-Lasha) 250 MG tablet; Take 2 tablets by mouth on day 1, then 1 tablet daily on days 2-5  Dispense: 6 tablet; Refill: 0          Pt thought to be clinically stable at this time.    Follow Up   Return if symptoms worsen or fail to improve.  Patient was given instructions and counseling regarding her condition or for health maintenance advice. Please see specific information pulled into the AVS if appropriate.

## 2024-04-18 RX ORDER — CEFDINIR 300 MG/1
300 CAPSULE ORAL 2 TIMES DAILY
Qty: 14 CAPSULE | Refills: 0 | Status: SHIPPED | OUTPATIENT
Start: 2024-04-18

## 2024-04-18 NOTE — PROGRESS NOTES
Right lower lobe pneumonia. Already started on antibiotics but going to send in another antibiotic to give her dual therapy.

## 2024-04-19 ENCOUNTER — HOSPITAL ENCOUNTER (OUTPATIENT)
Dept: CT IMAGING | Facility: HOSPITAL | Age: 69
Discharge: HOME | End: 2024-04-19
Payer: MEDICARE

## 2024-04-19 DIAGNOSIS — R06.89 TROUBLE BREATHING: ICD-10-CM

## 2024-04-19 DIAGNOSIS — R07.89 OTHER CHEST PAIN: ICD-10-CM

## 2024-04-19 DIAGNOSIS — R01.1 SYSTOLIC MURMUR: ICD-10-CM

## 2024-04-19 DIAGNOSIS — R04.2 HEMOPTYSIS: ICD-10-CM

## 2024-04-19 LAB
CREAT BLDA-MCNC: 0.9 MG/DL (ref 0.6–1.3)
EGFRCR SERPLBLD CKD-EPI 2021: 69.3 ML/MIN/1.73

## 2024-04-19 PROCEDURE — 82565 ASSAY OF CREATININE: CPT

## 2024-04-19 PROCEDURE — 71260 CT THORAX DX C+: CPT

## 2024-04-19 PROCEDURE — 25510000001 IOPAMIDOL PER 1 ML

## 2024-04-19 RX ADMIN — IOPAMIDOL 100 ML: 755 INJECTION, SOLUTION INTRAVENOUS at 09:13

## 2024-04-19 NOTE — PROGRESS NOTES
I'm mainly trying to decide what to do with this possible pulmonary arterial hypertension. Do you think this is something she needs to see pulmonology or cardiology for?

## 2024-04-22 DIAGNOSIS — I27.21 PULMONARY ARTERY HYPERTENSION: Primary | ICD-10-CM

## 2024-05-09 ENCOUNTER — TELEPHONE (OUTPATIENT)
Dept: FAMILY MEDICINE CLINIC | Facility: CLINIC | Age: 69
End: 2024-05-09
Payer: MEDICARE

## 2024-05-09 DIAGNOSIS — R06.02 SOB (SHORTNESS OF BREATH): Primary | ICD-10-CM

## 2024-05-09 NOTE — TELEPHONE ENCOUNTER
Caller: Elysia Babcock    Relationship: Self    Best call back number: [phone]   What was the call regarding:       THE PATIENT SAID SHE WILL NOT BE ABLE TO SEE THE PULMONOLOGIST UNTIL AUGUST. SHE IS WANTING TO BE REFERRED TO ANOTHER PROVIDER.        THE PATIENT SAID SHE WAS ONLY PRESCRIBED A WEEKS WORTH OF THE FUROSEMIDE. SHE SAID IT DID NOT HELP AND IS WANTING TO KNOW IF THE DOSAGE NEEDS TO BE INCREASED

## 2024-05-13 DIAGNOSIS — M85.89 OSTEOPENIA OF MULTIPLE SITES: ICD-10-CM

## 2024-05-13 RX ORDER — FUROSEMIDE 40 MG/1
40 TABLET ORAL DAILY
Qty: 7 TABLET | Refills: 0 | Status: SHIPPED | OUTPATIENT
Start: 2024-05-13 | End: 2024-05-17

## 2024-05-13 RX ORDER — ALENDRONATE SODIUM 70 MG/1
70 TABLET ORAL
Qty: 12 TABLET | Refills: 0 | Status: SHIPPED | OUTPATIENT
Start: 2024-05-13

## 2024-05-17 ENCOUNTER — APPOINTMENT (OUTPATIENT)
Dept: GENERAL RADIOLOGY | Facility: HOSPITAL | Age: 69
End: 2024-05-17
Payer: MEDICARE

## 2024-05-17 ENCOUNTER — APPOINTMENT (OUTPATIENT)
Dept: CT IMAGING | Facility: HOSPITAL | Age: 69
End: 2024-05-17
Payer: MEDICARE

## 2024-05-17 ENCOUNTER — HOSPITAL ENCOUNTER (EMERGENCY)
Facility: HOSPITAL | Age: 69
Discharge: HOME | End: 2024-05-17
Attending: EMERGENCY MEDICINE
Payer: MEDICARE

## 2024-05-17 VITALS
SYSTOLIC BLOOD PRESSURE: 130 MMHG | WEIGHT: 225.97 LBS | TEMPERATURE: 97.8 F | RESPIRATION RATE: 20 BRPM | BODY MASS INDEX: 36.32 KG/M2 | HEIGHT: 66 IN | HEART RATE: 55 BPM | OXYGEN SATURATION: 95 % | DIASTOLIC BLOOD PRESSURE: 68 MMHG

## 2024-05-17 DIAGNOSIS — I50.9 ACUTE ON CHRONIC CONGESTIVE HEART FAILURE, UNSPECIFIED HEART FAILURE TYPE: Primary | ICD-10-CM

## 2024-05-17 DIAGNOSIS — I27.20 PULMONARY HYPERTENSION: ICD-10-CM

## 2024-05-17 LAB
ALBUMIN SERPL-MCNC: 3.9 G/DL (ref 3.5–5.2)
ALBUMIN/GLOB SERPL: 1.4 G/DL
ALP SERPL-CCNC: 60 U/L (ref 39–117)
ALT SERPL W P-5'-P-CCNC: 13 U/L (ref 1–33)
ANION GAP SERPL CALCULATED.3IONS-SCNC: 11.8 MMOL/L (ref 5–15)
AST SERPL-CCNC: 17 U/L (ref 1–32)
BASOPHILS # BLD AUTO: 0.05 10*3/MM3 (ref 0–0.2)
BASOPHILS NFR BLD AUTO: 0.8 % (ref 0–1.5)
BILIRUB SERPL-MCNC: 1 MG/DL (ref 0–1.2)
BUN SERPL-MCNC: 17 MG/DL (ref 8–23)
BUN/CREAT SERPL: 20.5 (ref 7–25)
CALCIUM SPEC-SCNC: 8.9 MG/DL (ref 8.6–10.5)
CHLORIDE SERPL-SCNC: 103 MMOL/L (ref 98–107)
CO2 SERPL-SCNC: 22.2 MMOL/L (ref 22–29)
CREAT SERPL-MCNC: 0.83 MG/DL (ref 0.57–1)
DEPRECATED RDW RBC AUTO: 60.1 FL (ref 37–54)
EGFRCR SERPLBLD CKD-EPI 2021: 76.4 ML/MIN/1.73
EOSINOPHIL # BLD AUTO: 0.22 10*3/MM3 (ref 0–0.4)
EOSINOPHIL NFR BLD AUTO: 3.3 % (ref 0.3–6.2)
ERYTHROCYTE [DISTWIDTH] IN BLOOD BY AUTOMATED COUNT: 15.8 % (ref 12.3–15.4)
GLOBULIN UR ELPH-MCNC: 2.8 GM/DL
GLUCOSE SERPL-MCNC: 117 MG/DL (ref 65–99)
HCT VFR BLD AUTO: 37.4 % (ref 34–46.6)
HGB BLD-MCNC: 12.1 G/DL (ref 12–15.9)
HOLD SPECIMEN: NORMAL
HOLD SPECIMEN: NORMAL
IMM GRANULOCYTES # BLD AUTO: 0.03 10*3/MM3 (ref 0–0.05)
IMM GRANULOCYTES NFR BLD AUTO: 0.5 % (ref 0–0.5)
LYMPHOCYTES # BLD AUTO: 1.17 10*3/MM3 (ref 0.7–3.1)
LYMPHOCYTES NFR BLD AUTO: 17.8 % (ref 19.6–45.3)
MCH RBC QN AUTO: 33.4 PG (ref 26.6–33)
MCHC RBC AUTO-ENTMCNC: 32.4 G/DL (ref 31.5–35.7)
MCV RBC AUTO: 103.3 FL (ref 79–97)
MONOCYTES # BLD AUTO: 0.52 10*3/MM3 (ref 0.1–0.9)
MONOCYTES NFR BLD AUTO: 7.9 % (ref 5–12)
NEUTROPHILS NFR BLD AUTO: 4.58 10*3/MM3 (ref 1.7–7)
NEUTROPHILS NFR BLD AUTO: 69.7 % (ref 42.7–76)
NRBC BLD AUTO-RTO: 0 /100 WBC (ref 0–0.2)
NT-PROBNP SERPL-MCNC: 2714 PG/ML (ref 0–900)
PLATELET # BLD AUTO: 181 10*3/MM3 (ref 140–450)
PMV BLD AUTO: 10.5 FL (ref 6–12)
POTASSIUM SERPL-SCNC: 4 MMOL/L (ref 3.5–5.2)
PROT SERPL-MCNC: 6.7 G/DL (ref 6–8.5)
RBC # BLD AUTO: 3.62 10*6/MM3 (ref 3.77–5.28)
SODIUM SERPL-SCNC: 137 MMOL/L (ref 136–145)
TROPONIN T SERPL HS-MCNC: 16 NG/L
WBC NRBC COR # BLD AUTO: 6.57 10*3/MM3 (ref 3.4–10.8)
WHOLE BLOOD HOLD COAG: NORMAL
WHOLE BLOOD HOLD SPECIMEN: NORMAL

## 2024-05-17 PROCEDURE — 99285 EMERGENCY DEPT VISIT HI MDM: CPT

## 2024-05-17 PROCEDURE — 25010000002 FUROSEMIDE PER 20 MG: Performed by: EMERGENCY MEDICINE

## 2024-05-17 PROCEDURE — 71260 CT THORAX DX C+: CPT

## 2024-05-17 PROCEDURE — 93005 ELECTROCARDIOGRAM TRACING: CPT | Performed by: EMERGENCY MEDICINE

## 2024-05-17 PROCEDURE — 71045 X-RAY EXAM CHEST 1 VIEW: CPT

## 2024-05-17 PROCEDURE — 93005 ELECTROCARDIOGRAM TRACING: CPT

## 2024-05-17 PROCEDURE — 25510000001 IOPAMIDOL PER 1 ML: Performed by: EMERGENCY MEDICINE

## 2024-05-17 PROCEDURE — 96374 THER/PROPH/DIAG INJ IV PUSH: CPT

## 2024-05-17 PROCEDURE — 80053 COMPREHEN METABOLIC PANEL: CPT | Performed by: EMERGENCY MEDICINE

## 2024-05-17 PROCEDURE — 85025 COMPLETE CBC W/AUTO DIFF WBC: CPT

## 2024-05-17 PROCEDURE — 83880 ASSAY OF NATRIURETIC PEPTIDE: CPT | Performed by: EMERGENCY MEDICINE

## 2024-05-17 PROCEDURE — 84484 ASSAY OF TROPONIN QUANT: CPT | Performed by: EMERGENCY MEDICINE

## 2024-05-17 RX ORDER — FUROSEMIDE 20 MG/1
60 TABLET ORAL DAILY
Qty: 21 TABLET | Refills: 0 | Status: SHIPPED | OUTPATIENT
Start: 2024-05-18 | End: 2024-05-25

## 2024-05-17 RX ORDER — FUROSEMIDE 10 MG/ML
60 INJECTION INTRAMUSCULAR; INTRAVENOUS ONCE
Status: COMPLETED | OUTPATIENT
Start: 2024-05-17 | End: 2024-05-17

## 2024-05-17 RX ORDER — SODIUM CHLORIDE 0.9 % (FLUSH) 0.9 %
10 SYRINGE (ML) INJECTION AS NEEDED
Status: DISCONTINUED | OUTPATIENT
Start: 2024-05-17 | End: 2024-05-17 | Stop reason: HOSPADM

## 2024-05-17 RX ADMIN — FUROSEMIDE 60 MG: 10 INJECTION, SOLUTION INTRAMUSCULAR; INTRAVENOUS at 14:27

## 2024-05-17 RX ADMIN — IOPAMIDOL 100 ML: 755 INJECTION, SOLUTION INTRAVENOUS at 12:29

## 2024-05-17 NOTE — ED PROVIDER NOTES
Time: 2:23 PM EDT  Date of encounter:  2024  Independent Historian/Clinical History and Information was obtained by:   Patient  Chief Complaint: Difficulty breathing    History is limited by: N/A    History of Present Illness:  Patient is a 68 y.o. year old female who presents to the emergency department for evaluation of difficulty breathing.  Patient states that this has been ongoing for at least 6 weeks.  Patient states that her breathing is getting worse.  Patient states that she can only walk few feet without getting short of breath and having to take a break.  She also states that she has to sleep sitting up.  She denies any fevers.  She does admit to a cough with clear mucus.  Patient is on Lasix.  Patient reports after last ER visit she follow-up with Dr. Vazquez who put her on the Lasix.  Patient says she still not improving.  She then follow-up with her PCP who has referred her to pulmonary but she cannot get until .  Patient reports that there is no way she can wait that long and thus presents to the ER for ongoing symptoms.    HPI    Patient Care Team  Primary Care Provider: Devora Johnson DO    Past Medical History:     No Known Allergies  Past Medical History:   Diagnosis Date    Allergic     Arthritis     Asthma     CHF (congestive heart failure)     Clotting disorder     Congenital heart disease     Coronary artery disease     Depression     Heart murmur     Hyperlipidemia     Hypertension     Obesity     Pneumonia 2023    Visual impairment      Past Surgical History:   Procedure Laterality Date     SECTION      CHOLECYSTECTOMY      COLONOSCOPY      CORONARY STENT PLACEMENT      HIP BIPOLAR REPLACEMENT      JOINT REPLACEMENT      TONSILLECTOMY       Family History   Problem Relation Age of Onset    Asthma Mother     Depression Mother     Hyperlipidemia Mother     Heart disease Father        Home Medications:  Prior to Admission medications    Medication Sig  Start Date End Date Taking? Authorizing Provider   albuterol sulfate  (90 Base) MCG/ACT inhaler Inhale 2 puffs Every 4 (Four) Hours As Needed for Wheezing. 9/6/23   Jamila Burrows APRN   alendronate (FOSAMAX) 70 MG tablet TAKE 1 TABLET BY MOUTH EVERY 7 DAYS 5/13/24   Devora Johnson DO   aspirin 81 MG EC tablet Take 1 tablet by mouth Daily.    Emergency, Nurse Crispin, RN   azithromycin (Zithromax Z-Lasha) 250 MG tablet Take 2 tablets by mouth on day 1, then 1 tablet daily on days 2-5 4/17/24   Alicia Murphy APRN   cefdinir (OMNICEF) 300 MG capsule Take 1 capsule by mouth 2 (Two) Times a Day. 4/18/24   Alicia Murphy APRN   FLUoxetine (PROzac) 40 MG capsule  4/23/23   ProviderCarin MD   furosemide (Lasix) 40 MG tablet Take 1 tablet by mouth Daily for 7 days. 5/13/24 5/20/24  Alicia Murphy APRN   ibuprofen (ADVIL,MOTRIN) 400 MG tablet Take 1 tablet by mouth Every 6 (Six) Hours As Needed for Mild Pain.    ProviderCarin MD   ipratropium-albuterol (DUO-NEB) 0.5-2.5 mg/3 ml nebulizer USE 3 ML VIA NEBULIZER EVERY 4 HOURS AS NEEDED FOR WHEEZING 10/16/23   Carin Turner MD   metoprolol tartrate (LOPRESSOR) 50 MG tablet Take 1 tablet by mouth 2 (Two) Times a Day. 2/26/24   Deena Casarez APRN   sodium-potassium-magnesium sulfates (Suprep Bowel Prep Kit) 17.5-3.13-1.6 GM/177ML solution oral solution Take as directed.  Instructions given in office.  Dispense: 2 bottles  Patient not taking: Reported on 4/17/2024 4/16/24   Ale Garza, APRCINDY   vitamin D3 125 MCG (5000 UT) capsule capsule Take 1 capsule by mouth Daily.    ProviderCarin MD        Social History:   Social History     Tobacco Use    Smoking status: Never     Passive exposure: Never    Smokeless tobacco: Never    Tobacco comments:     No second hand smoke exposure    Vaping Use    Vaping status: Never Used   Substance Use Topics    Alcohol use: Yes     Alcohol/week: 6.0 standard drinks of alcohol     Types: 3  "Glasses of wine, 3 Drinks containing 0.5 oz of alcohol per week     Comment: I enjoy happy hour.    Drug use: Never         Review of Systems:  Review of Systems   Constitutional:  Negative for chills and fever.   HENT:  Negative for congestion, ear pain and sore throat.    Eyes:  Negative for pain.   Respiratory:  Positive for shortness of breath. Negative for cough and chest tightness.         Dyspnea with exertion   Cardiovascular:  Positive for leg swelling. Negative for chest pain.        Orthopnea   Gastrointestinal:  Negative for abdominal pain, diarrhea, nausea and vomiting.   Genitourinary:  Negative for flank pain and hematuria.   Musculoskeletal:  Negative for joint swelling.   Skin:  Negative for pallor.   Neurological:  Negative for seizures and headaches.   All other systems reviewed and are negative.       Physical Exam:  /57 (Patient Position: Sitting)   Pulse 53   Temp 97.9 °F (36.6 °C) (Oral)   Resp 22   Ht 167.6 cm (66\")   Wt 102 kg (225 lb 15.5 oz)   LMP  (LMP Unknown)   SpO2 90%   BMI 36.47 kg/m²     Physical Exam    Vital signs were reviewed under triage note.  General appearance - Patient appears well-developed and well-nourished.  Patient is in no acute distress.  Head - Normocephalic, atraumatic.  Pupils - Equal, round, reactive to light.  Extraocular muscles are intact.  Conjunctiva is clear.  Nasal - Normal inspection.  No evidence of trauma or epistaxis.  Tympanic membranes - Gray, intact without erythema or retractions.  Oral mucosa - Pink and moist without lesions or erythema.  Uvula is midline.  Chest wall - Atraumatic.  Chest wall is nontender.  There are no vesicular rashes noted.  Neck - Supple.  Trachea was midline.  There is no palpable lymphadenopathy or thyromegaly.  There are no meningeal signs  Lungs - Auscultation reveals some mild bibasilar crackles.  Heart - Regular rate and rhythm without any murmurs, clicks, or gallops.  Abdomen - Soft.  Bowel sounds are " present.  There is no palpable tenderness.  There is no rebound, guarding, or rigidity.  There are no palpable masses.  There are no pulsatile masses.  Back - Spine is straight and midline.  There is no CVA tenderness.  Extremities - Intact x4 with full range of motion.  There is 1+ palpable edema.  Pulses are intact x4 and equal.  Neurologic - Patient is awake, alert, and oriented x3.  Cranial nerves II through XII are grossly intact.  Motor and sensory functions grossly intact.  Cerebellar function was normal.  Integument - There are no rashes.  There are no petechia or purpura lesions noted.  There are no vesicular lesions noted.           Procedures:  Procedures      Medical Decision Making:      Comorbidities that affect care:    Arthritis, coronary artery disease, depression, hypertension, hyperlipidemia, asthma, CHF    External Notes reviewed:    Previous Clinic Note: Office visit with JERSON Cortes from 4/17/2024 was reviewed by me.      The following orders were placed and all results were independently analyzed by me:  Orders Placed This Encounter   Procedures    XR Chest 1 View    CT Chest With Contrast Diagnostic    [place] Draw    Comprehensive Metabolic Panel    BNP    Single High Sensitivity Troponin T    CBC Auto Differential    NPO Diet NPO Type: Strict NPO    Undress & Gown    Continuous Pulse Oximetry    Vital Signs    Pulmonology (on-call MD unless specified)    Oxygen Therapy- Nasal Cannula; Titrate 1-6 LPM Per SpO2; 90 - 95%    ECG 12 Lead ED Triage Standing Order; SOA    Insert Peripheral IV    CBC & Differential    Green Top (Gel)    Lavender Top    Gold Top - SST    Light Blue Top       Medications Given in the Emergency Department:  Medications   sodium chloride 0.9 % flush 10 mL (has no administration in time range)   iopamidol (ISOVUE-370) 76 % injection 100 mL (100 mL Intravenous Given 5/17/24 1229)   furosemide (LASIX) injection 60 mg (60 mg Intravenous Given 5/17/24 1427)         ED Course:    ED Course as of 05/17/24 1942   Fri May 17, 2024   1939 EKG performed at 1107 was interpreted by me shows sinus bradycardia with a ventricular rate of 54 bpm.  The [place] interval is 169 ms.  P waves are normal.  QRS interval is normal.  Axis was at 39 degrees.  There is no acute ischemic ST or T wave change identified.  Patient does have voltage criteria suggesting LVH.  QT corrected is borderline prolonged at 497 ms. [TB]      ED Course User Index  [TB] Westley Xiong DO   The patient was seen and evaluated in the ED by me.  The above history and physical examination was performed as documented.  Diagnostic data was obtained.  Results reviewed.  Findings were discussed with the patient.  Subsequently, I consulted Dr. Pyle.  Dr. Pyle will get her in to be seen next week in the pulmonary clinic by a provider.  In the interim I did give the patient 60 mg of Lasix here and will send her home with a prescription to increase her Lasix to 60 mg daily until she is seen.  I feel she may have a combination process of both congestive heart failure but also pulmonary hypertension.  Patient is aware and agreeable to this treatment plan.    Labs:    Lab Results (last 24 hours)       Procedure Component Value Units Date/Time    CBC & Differential [[id number]]  (Abnormal) Collected: 05/17/24 1053    Specimen: Blood Updated: 05/17/24 1107    Narrative:      The following orders were created for panel order CBC & Differential.  Procedure                               Abnormality         Status                     ---------                               -----------         ------                     CBC Auto Differential[[id number]]        Abnormal            Final result                 Please view results for these tests on the individual orders.    Comprehensive Metabolic Panel [[id number]]  (Abnormal) Collected: 05/17/24 1053    Specimen: Blood Updated: 05/17/24 1121     Glucose 117 mg/dL      BUN 17 mg/dL       Creatinine 0.83 mg/dL      Sodium 137 mmol/L      Potassium 4.0 mmol/L      Chloride 103 mmol/L      CO2 22.2 mmol/L      Calcium 8.9 mg/dL      Total Protein 6.7 g/dL      Albumin 3.9 g/dL      ALT (SGPT) 13 U/L      AST (SGOT) 17 U/L      Alkaline Phosphatase 60 U/L      Total Bilirubin 1.0 mg/dL      Globulin 2.8 gm/dL      A/G Ratio 1.4 g/dL      BUN/Creatinine Ratio 20.5     Anion Gap 11.8 mmol/L      eGFR 76.4 mL/min/1.73     Narrative:      GFR Normal >60  Chronic Kidney Disease <60  Kidney Failure <15      BNP [[id number]]  (Abnormal) Collected: 05/17/24 1053    Specimen: Blood Updated: 05/17/24 1124     proBNP 2,714.0 pg/mL     Narrative:      This assay is used as an aid in the diagnosis of individuals suspected of having heart failure. It can be used as an aid in the diagnosis of acute decompensated heart failure (ADHF) in patients presenting with signs and symptoms of ADHF to the emergency department (ED). In addition, NT-proBNP of <300 pg/mL indicates ADHF is not likely.    Age Range Result Interpretation  NT-proBNP Concentration (pg/mL:      <50             Positive            >450                   Gray                 300-450                    Negative             <300    50-75           Positive            >900                  Gray                300-900                  Negative            <300      >75             Positive            >1800                  Gray                300-1800                  Negative            <300    Single High Sensitivity Troponin T [[id number]]  (Abnormal) Collected: 05/17/24 1053    Specimen: Blood Updated: 05/17/24 1124     HS Troponin T 16 ng/L     Narrative:      High Sensitive Troponin T Reference Range:  <14.0 ng/L- Negative Female for AMI  <22.0 ng/L- Negative Male for AMI  >=14 - Abnormal Female indicating possible myocardial injury.  >=22 - Abnormal Male indicating possible myocardial injury.   Clinicians would have to utilize clinical acumen, EKG,  Troponin, and serial changes to determine if it is an Acute Myocardial Infarction or myocardial injury due to an underlying chronic condition.         CBC Auto Differential [[id number]]  (Abnormal) Collected: 05/17/24 1053    Specimen: Blood Updated: 05/17/24 1107     WBC 6.57 10*3/mm3      RBC 3.62 10*6/mm3      Hemoglobin 12.1 g/dL      Hematocrit 37.4 %      .3 fL      MCH 33.4 pg      MCHC 32.4 g/dL      RDW 15.8 %      RDW-SD 60.1 fl      MPV 10.5 fL      Platelets 181 10*3/mm3      Neutrophil % 69.7 %      Lymphocyte % 17.8 %      Monocyte % 7.9 %      Eosinophil % 3.3 %      Basophil % 0.8 %      Immature Grans % 0.5 %      Neutrophils, Absolute 4.58 10*3/mm3      Lymphocytes, Absolute 1.17 10*3/mm3      Monocytes, Absolute 0.52 10*3/mm3      Eosinophils, Absolute 0.22 10*3/mm3      Basophils, Absolute 0.05 10*3/mm3      Immature Grans, Absolute 0.03 10*3/mm3      nRBC 0.0 /100 WBC              Imaging:    CT Chest With Contrast Diagnostic    Result Date: 5/17/2024  CT CHEST W CONTRAST DIAGNOSTIC-  Date of Exam: 5/17/2024 12:22 PM  Indication: Shortness of breath, orthopnea, history of pericardial effusion.  Comparison: CT chest with contrast 4/19/2024  Technique: Axial CT images were obtained of the chest after the uneventful intravenous administration of 100 mL Isovue-370. Reconstructed coronal and sagittal images were also obtained. Automated exposure control and iterative construction methods were used.  Findings: Soft tissue of the lower neck are without acute abnormality. There is marked cardiomegaly. Extensive coronary artery calcifications most pronounced in the LAD. Small pericardial effusion.  Main pulmonary artery is dilated up to 4.3 cm similar to prior study which can be seen in the setting of pulmonary hypertension. Negative for pulmonary embolus. Scattered mildly enlarged low paratracheal and subcarinal lymph nodes are unchanged which may relate to chronic reactive adenopathy.  Trachea  and mainstem bronchi are patent. Septal thickening and groundglass opacities throughout the lungs most compatible pulmonary edema. No pneumothorax. No focal consolidation. Small bilateral pleural effusions which are increased from prior study.  Upper abdomen demonstrates normal visualized portions of the liver, spleen, adrenal glands. Fatty replacement of the pancreas. The gallbladder is absent. No free fluid in the upper abdomen. No aggressive osseous lesion or acute fracture.      Impression: 1. Negative for pulmonary embolus. 2. Cardiomegaly and pulmonary edema with small bilateral pleural effusions. 3. Dilated main pulmonary artery similar to prior study which may relate to pulmonary hypertension. 4. Coronary artery calcifications and additional chronic findings above.    Electronically Signed By-Jonathan Ricks MD On:5/17/2024 12:45 PM      XR Chest 1 View    Result Date: 5/17/2024  XR CHEST 1 VW-  Date of Exam: 5/17/2024 11:10 AM  Indication: SOA Triage Protocol  Comparison: 4/17/2024  Findings: Cardiomegaly. Pulmonary vasculature does not appear especially prominent. No definite intralobular septal thickening. Right lung clear. Airspace disease in the left base obscuring the diaphragm      Impression:  1. Cardiomegaly with no gross findings of CHF 2. Left basilar atelectasis or pneumonia   Electronically Signed By-Prieto Castelan On:5/17/2024 11:35 AM         Differential Diagnosis and Discussion:    Dyspnea: Differential diagnosis includes but is not limited to metabolic acidosis, neurological disorders, psychogenic, asthma, pneumothorax, upper airway obstruction, COPD, pneumonia, noncardiogenic pulmonary edema, interstitial lung disease, anemia, congestive heart failure, and pulmonary embolism    All labs were reviewed and interpreted by me.  All X-rays impressions were independently interpreted by me.  EKG was interpreted by me.  CT scan radiology impression was interpreted by me.    MDM     Amount and/or  Complexity of Data Reviewed  Clinical lab tests: reviewed  Tests in the radiology section of CPT®: reviewed  Tests in the medicine section of CPT®: reviewed  Decide to obtain previous medical records or to obtain history from someone other than the patient: yes             Patient Care Considerations:    ANTIBIOTICS: I considered prescribing antibiotics as an outpatient however no bacterial focus of infection was found.      Consultants/Shared Management Plan:    I have discussed the case with Dr. Pyle who states that the patient can be safely discharged with close follow up.    Social Determinants of Health:    Patient is independent, reliable, and has access to care.       Disposition and Care Coordination:    Discharged: I considered escalation of care by admitting this patient to the hospital, however after obtaining the ED workup and discussed case with Dr. Gonzalez we will discharge patient home on increased diuretics and follow-up in pulmonary clinic next week.    I have explained the patient´s condition, diagnoses and treatment plan based on the information available to me at this time. I have answered questions and addressed any concerns. The patient has a good  understanding of the patient´s diagnosis, condition, and treatment plan as can be expected at this point. The vital signs have been stable. The patient´s condition is stable and appropriate for discharge from the emergency department.      The patient will pursue further outpatient evaluation with the primary care physician or other designated or consulting physician as outlined in the discharge instructions. They are agreeable to this plan of care and follow-up instructions have been explained in detail. The patient has received these instructions in written format and has expressed an understanding of the discharge instructions. The patient is aware that any significant change in condition or worsening of symptoms should prompt an immediate return  to this or the closest emergency department or call to 911.  I have explained discharge medications and the need for follow up with the patient/caretakers. This was also printed in the discharge instructions. Patient was discharged with the following medications and follow up:      Medication List        Changed      furosemide 20 MG tablet  Commonly known as: Lasix  Take 3 tablets by mouth Daily for 7 days.  Start taking on: May 18, 2024  What changed:   medication strength  how much to take               Where to Get Your Medications        These medications were sent to [id number] - [place], [place] - [address] [place] [address] AT [facility] [place] - [phone] [facility] [phone] [address] [address]      Phone: [phone]   furosemide 20 MG tablet      Duncan Pyle MD  [address] [facility] [facility]  [address]  [facility] [address] [phone]      Pulmonary clinic office will call you next week with an appointment to be seen next week.      Final diagnoses:   Acute on chronic congestive heart failure, unspecified heart failure type   Pulmonary hypertension        ED Disposition       ED Disposition   Discharge    Condition   Stable    Comment   --               This medical record created using voice recognition software.             Westley Xiong DO  05/17/24 1942

## 2024-05-17 NOTE — TELEPHONE ENCOUNTER
Please tell her I am running behind on calls and I am sorry for the tardy response. She has a history of suspected cardiomyopathy on echo. I recommend she call her cardiologist and get back with him ASAP. Also, please call pulmonology and get her in quicker. BNP was normal in January. We could repeat some labs to check for new worsening heart failure and check her kidney function. I will order them. Also, put her on my schedule for next week if she wants.

## 2024-05-17 NOTE — DISCHARGE INSTRUCTIONS
Eat a low-salt diet.  Stop your current Lasix dose.  I wrote you a new prescription to increase your dose to 60 mg daily for 1 week or until you are seen in the office.  Continue the other home medications as prescribed.  Avoid any strenuous activities.  Dr. Pyle's office will call you with an appointment to be seen this coming week.  If you do not receive a phone call by Tuesday at noon please call back to the ER and talk to the  to see if we can help get that set up.  Return to the ER in the interim for respiratory distress, fever greater than 101, or any other concerns issues that may arise.

## 2024-05-17 NOTE — TELEPHONE ENCOUNTER
Patient is currently being seen and evaluated at ED for Shortness of breath. Let referral coordinator know about pulmonology referral to see if he could get her seen sooner. Labs, chest CT and Xray completed    Ziggy spoke with ER nurse Eileen to see if doc could put in consult for pulmonology while she is there

## 2024-05-18 DIAGNOSIS — I10 PRIMARY HYPERTENSION: Chronic | ICD-10-CM

## 2024-05-18 LAB
QT INTERVAL: 522 MS
QTC INTERVAL: 497 MS

## 2024-05-20 RX ORDER — FLUOXETINE HYDROCHLORIDE 40 MG/1
40 CAPSULE ORAL DAILY
Qty: 30 CAPSULE | Refills: 3 | Status: SHIPPED | OUTPATIENT
Start: 2024-05-20

## 2024-05-20 RX ORDER — METOPROLOL TARTRATE 50 MG/1
50 TABLET, FILM COATED ORAL 2 TIMES DAILY
Qty: 180 TABLET | Refills: 3 | OUTPATIENT
Start: 2024-05-20

## 2024-05-21 ENCOUNTER — TELEPHONE (OUTPATIENT)
Dept: PULMONOLOGY | Facility: CLINIC | Age: 69
End: 2024-05-21
Payer: MEDICARE

## 2024-05-21 NOTE — TELEPHONE ENCOUNTER
Notified patient of appointment with Dr. Medina.  Patient verbalized understanding and is agreeable.

## 2024-05-28 ENCOUNTER — OFFICE VISIT (OUTPATIENT)
Dept: PULMONOLOGY | Facility: CLINIC | Age: 69
End: 2024-05-28
Payer: MEDICARE

## 2024-05-28 VITALS
BODY MASS INDEX: 36.58 KG/M2 | RESPIRATION RATE: 18 BRPM | HEIGHT: 66 IN | WEIGHT: 227.6 LBS | SYSTOLIC BLOOD PRESSURE: 128 MMHG | HEART RATE: 64 BPM | DIASTOLIC BLOOD PRESSURE: 77 MMHG | OXYGEN SATURATION: 96 %

## 2024-05-28 DIAGNOSIS — I42.2 HYPERTROPHIC CARDIOMYOPATHY: Primary | ICD-10-CM

## 2024-05-28 DIAGNOSIS — I27.20 PULMONARY HYPERTENSION: ICD-10-CM

## 2024-05-28 DIAGNOSIS — I50.32 CHRONIC DIASTOLIC CONGESTIVE HEART FAILURE: ICD-10-CM

## 2024-05-28 DIAGNOSIS — I10 PRIMARY HYPERTENSION: Chronic | ICD-10-CM

## 2024-05-28 PROCEDURE — 1159F MED LIST DOCD IN RCRD: CPT | Performed by: INTERNAL MEDICINE

## 2024-05-28 PROCEDURE — 1160F RVW MEDS BY RX/DR IN RCRD: CPT | Performed by: INTERNAL MEDICINE

## 2024-05-28 PROCEDURE — 3074F SYST BP LT 130 MM HG: CPT | Performed by: INTERNAL MEDICINE

## 2024-05-28 PROCEDURE — 3078F DIAST BP <80 MM HG: CPT | Performed by: INTERNAL MEDICINE

## 2024-05-28 PROCEDURE — 99203 OFFICE O/P NEW LOW 30 MIN: CPT | Performed by: INTERNAL MEDICINE

## 2024-05-31 NOTE — PRE-PROCEDURE INSTRUCTIONS
"Instructed on date and arrival time of 0700. Come to entrance \"C\". Must have  over age 18 to drive home.  May have two visitors; however, children under 12 must stay in waiting room.  Discussed clear liquid diet (no red or purple), bowel prep, and NPO.  May take medications as usual except for blood thinners, diabetic medications, and weight loss medications.  Verbalized understanding of instructions given.  Instructed to call for questions or concerns.  "

## 2024-06-03 ENCOUNTER — PATIENT ROUNDING (BHMG ONLY) (OUTPATIENT)
Dept: PULMONOLOGY | Facility: CLINIC | Age: 69
End: 2024-06-03
Payer: MEDICARE

## 2024-06-03 NOTE — PROGRESS NOTES
Jayda 3, 2024    Hello, may I speak with Elysia Babcock?    My name is Orly      I am  with [facility]  [address]  [address]  [facility] [address] [phone].    Before we get started may I verify your date of birth? 1955    I am calling to officially welcome you to our practice and ask about your recent visit. Is this a good time to talk? My chart message sent for patient rounding.

## 2024-06-04 ENCOUNTER — TELEPHONE (OUTPATIENT)
Dept: SURGERY | Facility: CLINIC | Age: 69
End: 2024-06-04

## 2024-06-04 DIAGNOSIS — I10 PRIMARY HYPERTENSION: Chronic | ICD-10-CM

## 2024-06-04 RX ORDER — METOPROLOL TARTRATE 50 MG/1
50 TABLET, FILM COATED ORAL 2 TIMES DAILY
Qty: 180 TABLET | Refills: 3 | Status: CANCELLED | OUTPATIENT
Start: 2024-06-04

## 2024-06-04 NOTE — TELEPHONE ENCOUNTER
Spoke with patient and she states she is having a breathing test done sometime next month. Patient is seeing someone for pulmonary issues.     I have canceled her off the schedule for 6-6-.     \Will follow back up with patient.     Will patient need clearance from pulmonology when she gets rescheduled?

## 2024-06-04 NOTE — TELEPHONE ENCOUNTER
Hub staff attempted to follow warm transfer process and was unsuccessful     Caller: Elysia Babcock    Relationship to patient: Self    Best call back number: [phone]    Patient is needing: CANCEL C-SCOPE 6.6.24; IM EXPERIENCING PULMONARY HYPERTENSION AND NEED TO POSTPONE THIS PROCEDURE.    PT MENTIONS TROUBLE BREATHING.

## 2024-06-06 RX ORDER — FUROSEMIDE 20 MG/1
60 TABLET ORAL DAILY
Qty: 21 TABLET | Refills: 0 | Status: SHIPPED | OUTPATIENT
Start: 2024-06-06 | End: 2024-06-13

## 2024-06-06 NOTE — TELEPHONE ENCOUNTER
Caller: Sadiq Elysia M    Relationship: Self    Best call back number: [phone]    Requested Prescriptions:   Requested Prescriptions     Pending Prescriptions Disp Refills    furosemide (Lasix) 20 MG tablet 21 tablet 0     Sig: Take 3 tablets by mouth Daily for 7 days.        Pharmacy where request should be sent: [id number] - [address]  [phone] [facility] [phone]      Last office visit with prescribing clinician: 11/7/2023   Last telemedicine visit with prescribing clinician: Visit date not found   Next office visit with prescribing clinician: Visit date not found     Additional details provided by patient:      THE PATIENT SAID HER  PULMONOLOGISTS TOLD HER TO HAVE PCP ROSA CALL IN THIS MEDICATION            Would you like a call back once the refill request has been completed: [] Yes [x] No    If the office needs to give you a call back, can they leave a voicemail: [] Yes [x] No    Lei Awad   06/06/24 15:56 EDT

## 2024-06-11 ENCOUNTER — TELEPHONE (OUTPATIENT)
Dept: CARDIOLOGY | Facility: CLINIC | Age: 69
End: 2024-06-11
Payer: MEDICARE

## 2024-06-11 ENCOUNTER — TELEPHONE (OUTPATIENT)
Dept: CARDIOLOGY | Facility: CLINIC | Age: 69
End: 2024-06-11

## 2024-06-11 NOTE — TELEPHONE ENCOUNTER
Caller: Elysia Babcock    Relationship: Self    Best call back number: [phone]     Who is your current provider:     Is your current provider offboarding? NO    Who would you like your new provider to be:     What are your reasons for transferring care: PATIENT HAS CHF AND HAS HAD ISSUES BREATHING FOR OVER 1 MONTH. THE PATIENT DOES NOT FEEL LIKE  IS ASSISTING IN THE WAY SHE NEEDS.  WAS RECOMMENDED BY HER FAMILY MEMBER.

## 2024-07-16 ENCOUNTER — HOSPITAL ENCOUNTER (OUTPATIENT)
Dept: RESPIRATORY THERAPY | Facility: HOSPITAL | Age: 69
Discharge: HOME | End: 2024-07-16
Payer: MEDICARE

## 2024-07-16 DIAGNOSIS — I50.32 CHRONIC DIASTOLIC CONGESTIVE HEART FAILURE: ICD-10-CM

## 2024-07-16 DIAGNOSIS — I27.20 PULMONARY HYPERTENSION: ICD-10-CM

## 2024-07-16 DIAGNOSIS — I42.2 HYPERTROPHIC CARDIOMYOPATHY: ICD-10-CM

## 2024-07-16 PROCEDURE — 94726 PLETHYSMOGRAPHY LUNG VOLUMES: CPT

## 2024-07-16 PROCEDURE — 94060 EVALUATION OF WHEEZING: CPT

## 2024-07-16 PROCEDURE — 94729 DIFFUSING CAPACITY: CPT

## 2024-07-16 RX ORDER — ALBUTEROL SULFATE 2.5 MG/3ML
2.5 SOLUTION RESPIRATORY (INHALATION) ONCE
Status: COMPLETED | OUTPATIENT
Start: 2024-07-16 | End: 2024-07-16

## 2024-07-16 RX ADMIN — ALBUTEROL SULFATE 2.5 MG: 2.5 SOLUTION RESPIRATORY (INHALATION) at 09:18

## 2024-07-24 ENCOUNTER — TELEPHONE (OUTPATIENT)
Dept: SURGERY | Facility: CLINIC | Age: 69
End: 2024-07-24
Payer: MEDICARE

## 2024-07-24 NOTE — TELEPHONE ENCOUNTER
----- Message from Victorina BOOTHE sent at 7/23/2024  1:46 PM EDT -----  PULMONARY CLEARANCE HARVEY SCHEDULED COLONOSCOPY FOR 08-27-24

## 2024-07-24 NOTE — TELEPHONE ENCOUNTER
PATIENT IS SCHEDULED FOR COLONOSCOPY ON 8-27-24 WITH DR. GUDINO. CAN WE GET PULMONARY CLEARANCE ON PATIENT?

## 2024-07-26 ENCOUNTER — OFFICE VISIT (OUTPATIENT)
Dept: CARDIOLOGY | Facility: CLINIC | Age: 69
End: 2024-07-26
Payer: MEDICARE

## 2024-07-26 VITALS
SYSTOLIC BLOOD PRESSURE: 148 MMHG | BODY MASS INDEX: 37.28 KG/M2 | HEIGHT: 66 IN | DIASTOLIC BLOOD PRESSURE: 83 MMHG | WEIGHT: 232 LBS | HEART RATE: 48 BPM

## 2024-07-26 DIAGNOSIS — I42.2 HYPERTROPHIC CARDIOMYOPATHY: Primary | ICD-10-CM

## 2024-07-26 DIAGNOSIS — R06.09 DYSPNEA ON EXERTION: ICD-10-CM

## 2024-07-26 DIAGNOSIS — I10 HYPERTENSION, ESSENTIAL: ICD-10-CM

## 2024-07-26 PROCEDURE — 1159F MED LIST DOCD IN RCRD: CPT | Performed by: INTERNAL MEDICINE

## 2024-07-26 PROCEDURE — 3079F DIAST BP 80-89 MM HG: CPT | Performed by: INTERNAL MEDICINE

## 2024-07-26 PROCEDURE — 3077F SYST BP >= 140 MM HG: CPT | Performed by: INTERNAL MEDICINE

## 2024-07-26 PROCEDURE — 99214 OFFICE O/P EST MOD 30 MIN: CPT | Performed by: INTERNAL MEDICINE

## 2024-07-26 PROCEDURE — 1160F RVW MEDS BY RX/DR IN RCRD: CPT | Performed by: INTERNAL MEDICINE

## 2024-07-26 RX ORDER — SPIRONOLACTONE 25 MG/1
25 TABLET ORAL DAILY
Qty: 90 TABLET | Refills: 3 | Status: SHIPPED | OUTPATIENT
Start: 2024-07-26

## 2024-07-26 RX ORDER — FUROSEMIDE 40 MG/1
40 TABLET ORAL DAILY
Qty: 30 TABLET | Refills: 3 | Status: SHIPPED | OUTPATIENT
Start: 2024-07-26

## 2024-07-26 NOTE — PROGRESS NOTES
Chief Complaint  Coronary Artery Disease, Follow-up, and Hypertension    Subjective            Elysia Babcock presents to [facility] CARDIOLOGY  Coronary Artery Disease  Symptoms include shortness of breath. Pertinent negatives include no chest pain.   Follow-upCurrent symptoms: shortness of breath. Current symptoms include no chest pain.   Hypertension  Associated symptoms include shortness of breath. Pertinent negatives include no chest pain.       Ms. Babcock is here for follow-up evaluation management of hypertrophic nonobstructive cardiomyopathy, essential hypertension.  She was previously following with a different cardiologist and has switched providers.  She has intermittent episodes of shortness of breath and fluid retention but this comes and goes.  Her most recent echo in February showed normal biventricular function with moderate LVH and no significant intracavitary gradient.  She is compliant with her medical therapy.    [facility]  Past Medical History:   Diagnosis Date    Allergic     Arthritis     Asthma     CHF (congestive heart failure)     Clotting disorder     Congenital heart disease     Coronary artery disease     Depression     Heart murmur     Hyperlipidemia     Hypertension     Obesity     Pleural effusion     Pneumonia 2023    Pulmonary arterial hypertension 2024    Rheumatoid arthritis     Visual impairment          SURGICALHX  Past Surgical History:   Procedure Laterality Date     SECTION      CHOLECYSTECTOMY      COLONOSCOPY      CORONARY STENT PLACEMENT      HIP BIPOLAR REPLACEMENT      JOINT REPLACEMENT      TONSILLECTOMY          SOC  Social History     Socioeconomic History    Marital status:    Tobacco Use    Smoking status: Never     Passive exposure: Never    Smokeless tobacco: Never    Tobacco comments:     No second hand smoke exposure    Vaping Use    Vaping status: Never Used   Substance and Sexual Activity    Alcohol use: Yes      Alcohol/week: 6.0 standard drinks of alcohol     Types: 3 Glasses of wine, 3 Drinks containing 0.5 oz of alcohol per week     Comment: I enjoy happy hour.    Drug use: Never    Sexual activity: Not Currently     Partners: Male     Birth control/protection: None     Comment: I have been off the pill for 25 years.         FAMHX  Family History   Problem Relation Age of Onset    Asthma Mother     Depression Mother     Hyperlipidemia Mother     Heart disease Father     Heart failure Father           ALLERGY  No Known Allergies     MEDSCURRENT    Current Outpatient Medications:     albuterol sulfate  (90 Base) MCG/ACT inhaler, Inhale 2 puffs Every 4 (Four) Hours As Needed for Wheezing., Disp: 18 g, Rfl: 0    alendronate (FOSAMAX) 70 MG tablet, TAKE 1 TABLET BY MOUTH EVERY 7 DAYS, Disp: 12 tablet, Rfl: 0    aspirin 81 MG EC tablet, Take 1 tablet by mouth Daily., Disp: , Rfl:     FLUoxetine (PROzac) 40 MG capsule, Take 1 capsule by mouth Daily., Disp: 30 capsule, Rfl: 3    furosemide (Lasix) 40 MG tablet, Take 1 tablet by mouth Daily., Disp: 30 tablet, Rfl: 3    ibuprofen (ADVIL,MOTRIN) 400 MG tablet, Take 1 tablet by mouth Every 6 (Six) Hours As Needed for Mild Pain., Disp: , Rfl:     metoprolol tartrate (LOPRESSOR) 50 MG tablet, Take 1 tablet by mouth 2 (Two) Times a Day., Disp: 180 tablet, Rfl: 3    vitamin D3 125 MCG (5000 UT) capsule capsule, Take 1 capsule by mouth Daily., Disp: , Rfl:     cefdinir (OMNICEF) 300 MG capsule, Take 1 capsule by mouth 2 (Two) Times a Day., Disp: 14 capsule, Rfl: 0    ipratropium-albuterol (DUO-NEB) 0.5-2.5 mg/3 ml nebulizer, USE 3 ML VIA NEBULIZER EVERY 4 HOURS AS NEEDED FOR WHEEZING (Patient not taking: Reported on 5/28/2024), Disp: , Rfl:     sodium-potassium-magnesium sulfates (Suprep Bowel Prep Kit) 17.5-3.13-1.6 GM/177ML solution oral solution, Take as directed.  Instructions given in office.  Dispense: 2 bottles (Patient not taking: Reported on 4/17/2024), Disp: 354 mL,  "Rfl: 0    spironolactone (ALDACTONE) 25 MG tablet, Take 1 tablet by mouth Daily., Disp: 90 tablet, Rfl: 3      Review of Systems   Cardiovascular:  Positive for dyspnea on exertion. Negative for chest pain.   Respiratory:  Positive for shortness of breath.         Objective     /83 (BP Location: Left arm)   Pulse (!) 48   Ht 167.6 cm (66\")   Wt 105 kg (232 lb)   BMI 37.45 kg/m²       General Appearance:   well developed  well nourished  HENT:   oropharynx moist  lips not cyanotic  Neck:  thyroid not enlarged  supple  Respiratory:  no respiratory distress  normal breath sounds  no rales  Cardiovascular:  no jugular venous distention  regular rhythm  apical impulse normal  S1 normal, S2 normal  no S3, no S4   Soft basal systolic murmur  no rub, no thrill  carotid pulses normal; no bruit  pedal pulses normal  lower extremity edema: none    Musculoskeletal:  no clubbing of fingers.   normocephalic, head atraumatic  Skin:   warm, dry  Psychiatric:  judgement and insight appropriate  normal mood and affect      Result Review :     The following data was reviewed by: Armin Gan MD on 07/26/2024:    CMP          1/25/2024    11:12 4/19/2024    09:02 5/17/2024    10:53   CMP   Glucose 104   117    BUN 18   17    Creatinine 0.83  0.90  0.83    EGFR 76.4  69.3  76.4    Sodium 138   137    Potassium 4.4   4.0    Chloride 103   103    Calcium 9.5   8.9    Total Protein 6.9   6.7    Albumin 4.1   3.9    Globulin 2.8   2.8    Total Bilirubin 0.8   1.0    Alkaline Phosphatase 70   60    AST (SGOT) 12   17    ALT (SGPT) 9   13    Albumin/Globulin Ratio 1.5   1.4    BUN/Creatinine Ratio 21.7   20.5    Anion Gap 10.9   11.8      CBC          10/13/2023    16:34 1/25/2024    11:12 5/17/2024    10:53   CBC   WBC 6.91  5.86  6.57    RBC 3.71  3.82  3.62    Hemoglobin 11.8  12.7  12.1    Hematocrit 34.9  38.6  37.4    MCV 94.1  101.0  103.3    MCH 31.8  33.2  33.4    MCHC 33.8  32.9  32.4    RDW 13.6  14.8  15.8 "    Platelets 117  161  181            Data reviewed : Primary care records reviewed, previous cardiology records reviewed, EKG from May showed sinus rhythm rate 54 with LVH      Procedures                  Assessment and Plan        ASSESSMENT:  Encounter Diagnoses   Name Primary?    Hypertrophic cardiomyopathy Yes    Dyspnea on exertion     Hypertension, essential          PLAN:    1.  Hypertrophic nonobstructive cardiomyopathy-she likely has symptoms secondary to diastolic dysfunction.  Her volume status appears stable today.  I am adding Aldactone 25 mg daily.  She will take the Lasix as needed.  Will check a basic metabolic panel in 2 weeks  2.  Dyspnea on exertion-likely secondary to diastolic dysfunction and tendency to retain fluid, medication adjustments as per #1  3.  Essential hypertension-mildly elevated today-adding Aldactone should help, she will monitor at home    Blood pressure and clinical follow-up in 2 months will be arranged          Patient was given instructions and counseling regarding her condition or for health maintenance advice. Please see specific information pulled into the AVS if appropriate.             GENESIS Gan MD  7/26/2024    13:35 EDT

## 2024-08-20 NOTE — PRE-PROCEDURE INSTRUCTIONS
"PAT call attempted.  No answer.  Detailed message with date and arrival time of 1330 given.  Instructed that arrival time is not procedure time but allows time to prepare for procedure. Come to entrance \"C\"; must have adult  for transportation home; may have two visitors; however, children under 12 must remain in waiting area; instructed on diet/clear liquids/NPO/bowel prep, if needed; may take normal meds two hours prior to arrival time except for blood thinners, antidiabetics, diuretics, and weight loss meds.  Instructed to return call to confirm receipt of instructions and for any questions.  Pulmonary clearance noted in chart.  "

## 2024-08-22 ENCOUNTER — TELEPHONE (OUTPATIENT)
Dept: SURGERY | Facility: CLINIC | Age: 69
End: 2024-08-22
Payer: MEDICARE

## 2024-08-22 NOTE — TELEPHONE ENCOUNTER
PATIENT RESCHEDULED FOR 10-01-24 @ 12 PM. PATIENT IS AWARE OF DATE/TIME AND VOICED UNDERSTANDING.

## 2024-09-04 ENCOUNTER — OFFICE VISIT (OUTPATIENT)
Dept: PULMONOLOGY | Facility: CLINIC | Age: 69
End: 2024-09-04
Payer: MEDICARE

## 2024-09-04 VITALS
HEART RATE: 54 BPM | WEIGHT: 234.2 LBS | HEIGHT: 66 IN | DIASTOLIC BLOOD PRESSURE: 50 MMHG | OXYGEN SATURATION: 97 % | BODY MASS INDEX: 37.64 KG/M2 | RESPIRATION RATE: 16 BRPM | TEMPERATURE: 97.7 F | SYSTOLIC BLOOD PRESSURE: 139 MMHG

## 2024-09-04 DIAGNOSIS — I27.20 PULMONARY HYPERTENSION: ICD-10-CM

## 2024-09-04 DIAGNOSIS — I50.32 CHRONIC DIASTOLIC CONGESTIVE HEART FAILURE: ICD-10-CM

## 2024-09-04 DIAGNOSIS — J20.9 ACUTE BRONCHITIS, UNSPECIFIED ORGANISM: Primary | ICD-10-CM

## 2024-09-04 DIAGNOSIS — I42.2 HYPERTROPHIC CARDIOMYOPATHY: ICD-10-CM

## 2024-09-04 PROCEDURE — 1160F RVW MEDS BY RX/DR IN RCRD: CPT | Performed by: INTERNAL MEDICINE

## 2024-09-04 PROCEDURE — 1159F MED LIST DOCD IN RCRD: CPT | Performed by: INTERNAL MEDICINE

## 2024-09-04 PROCEDURE — 3078F DIAST BP <80 MM HG: CPT | Performed by: INTERNAL MEDICINE

## 2024-09-04 PROCEDURE — 99214 OFFICE O/P EST MOD 30 MIN: CPT | Performed by: INTERNAL MEDICINE

## 2024-09-04 PROCEDURE — 3075F SYST BP GE 130 - 139MM HG: CPT | Performed by: INTERNAL MEDICINE

## 2024-09-04 RX ORDER — AZITHROMYCIN 250 MG/1
TABLET, FILM COATED ORAL
Qty: 6 TABLET | Refills: 0 | Status: SHIPPED | OUTPATIENT
Start: 2024-09-04

## 2024-09-04 NOTE — PROGRESS NOTES
Pulmonary Office Follow-up    Subjective     Elysia Babcock is seen today at the office for   Chief Complaint   Patient presents with    Follow-up     3  month  Pulmonary hypertension    Shortness of Breath    Cough    Wheezing    Results         HPI  Elysia Babcock is a 69 y.o. female with a PMH significant for CHF hypertrophic cardiomyopathy and pulmonary hypertension presents for follow-up patient complains of shortness of breath with cough and yellowish mucopurulent expectoration she contracted COVID-19 infection 2 weeks earlier patient denies any fever or chills she does have some swelling of both lower extremities and is on diuretics      Tobacco use history:  Never smoker      Patient Active Problem List   Diagnosis    Class 1 obesity due to excess calories with serious comorbidity and body mass index (BMI) of 33.0 to 33.9 in adult    Vitamin D deficiency    Coronary artery disease involving native coronary artery of native heart without angina pectoris    Moderate episode of recurrent major depressive disorder    Primary hypertension    Mixed hyperlipidemia    Hypertrophic cardiomyopathy    Lobar pneumonia    Osteopenia of multiple sites    Pericardial effusion    Screening for malignant neoplasm of colon       Review of Systems  Review of Systems   Respiratory:  Positive for cough, shortness of breath and wheezing.    All other systems reviewed and are negative.    As described in the HPI. Otherwise, remainder of ROS (14 systems) were negative.    Medications, Allergies, Social, and Family Histories reviewed as per EMR.    Result Review :            Objective     Vitals:    09/04/24 0952   BP: 139/50   Pulse: 54   Resp: 16   Temp: 97.7 °F (36.5 °C)   SpO2: 97%         09/04/24  0952   Weight: 106 kg (234 lb 3.2 oz)       Physical Exam  Vitals and nursing note reviewed.   Constitutional:       Appearance: Normal appearance.   HENT:      Head: Normocephalic and atraumatic.      Nose: Nose normal.       Mouth/Throat:      Mouth: Mucous membranes are moist.      Pharynx: Oropharynx is clear.   Eyes:      Extraocular Movements: Extraocular movements intact.      Conjunctiva/sclera: Conjunctivae normal.      Pupils: Pupils are equal, round, and reactive to light.   Cardiovascular:      Rate and Rhythm: Normal rate and regular rhythm.      Pulses: Normal pulses.      Heart sounds: Murmur heard.   Pulmonary:      Effort: Pulmonary effort is normal.      Breath sounds: Rhonchi present.   Musculoskeletal:         General: Normal range of motion.      Cervical back: Normal range of motion and neck supple.      Right lower leg: Edema present.      Left lower leg: Edema present.   Skin:     General: Skin is warm.      Capillary Refill: Capillary refill takes 2 to 3 seconds.   Neurological:      Mental Status: She is alert and oriented to person, place, and time.         No radiology results for the last 90 days.     Assessment & Plan     Diagnoses and all orders for this visit:    1. Acute bronchitis, unspecified organism (Primary)    2. Chronic diastolic congestive heart failure    3. Hypertrophic cardiomyopathy    4. Pulmonary hypertension    Other orders  -     azithromycin (ZITHROMAX) 250 MG tablet; Take 2 by mouth today then 1 daily for 4 days  Dispense: 6 tablet; Refill: 0         Discussion/ Recommendations:   PFTs reviewed show normal spirometry but she does have some reduced diffusion capacity  Will start her on Zithromax for 5 days  Continue home medications including Lasix  2 g sodium diet  Advised to reduce weight her BMI is 37.80  Vaccinations discussed and recommended             Return in about 6 months (around 3/4/2025).          This document has been electronically signed by Zenon Medina MD on September 4, 2024 10:00 EDT

## 2024-09-23 NOTE — PRE-PROCEDURE INSTRUCTIONS
"Instructed on date and arrival time of 1200. Instructed that arrival time is not their procedure time but allows time to prepare for procedure.  Come to entrance \"C\". Must have  over age 18 to drive home.  May have two visitors; however, children under 12 must stay in waiting room.  Discussed clear liquid diet (no red or purple), bowel prep, and NPO.  May take medications as usual except for blood thinners, diabetic medications, and weight loss medications.  Verbalized understanding of instructions given.  Instructed to call for questions or concerns.  Pulmonary clearance noted in chart.  "

## 2024-09-25 RX ORDER — FLUOXETINE 40 MG/1
40 CAPSULE ORAL DAILY
Qty: 30 CAPSULE | Refills: 3 | Status: SHIPPED | OUTPATIENT
Start: 2024-09-25

## 2024-09-30 ENCOUNTER — ANESTHESIA EVENT (OUTPATIENT)
Dept: GASTROENTEROLOGY | Facility: HOSPITAL | Age: 69
End: 2024-09-30
Payer: MEDICARE

## 2024-09-30 NOTE — ANESTHESIA PREPROCEDURE EVALUATION
Anesthesia Evaluation     Patient summary reviewed and Nursing notes reviewed   NPO Solid Status: > 8 hours  NPO Liquid Status: > 4 hours           Airway   Mallampati: I  TM distance: >3 FB  Neck ROM: full  No difficulty expected and Large neck circumference  Dental - normal exam     Pulmonary - normal exam    breath sounds clear to auscultation  (+) pneumonia resolved , pleural effusion, asthma (mild, intermittent),  Cardiovascular - normal exam  Exercise tolerance: good (4-7 METS)    ECG reviewed  Rhythm: regular  Rate: normal    (+) hypertension well controlled, valvular problems/murmurs (hx of nonobstructive hypertrophic cardiomyopathy) murmur, CAD, CHF Diastolic >=55%, pericardial effusion, hyperlipidemia      Neuro/Psych  (+) psychiatric history Depression  GI/Hepatic/Renal/Endo    (+) obesity, morbid obesity    Musculoskeletal     Abdominal   (+) obese   Substance History      OB/GYN          Other   arthritis,     ROS/Med Hx Other: EKG:  HEART RATE= 54  bpm  RR Interval= 1104  ms  ID Interval= 169  ms  P Horizontal Axis=   deg  P Front Axis= 28  deg  QRSD Interval= 85  ms  QT Interval= 522  ms  QTcB= 497  ms  QRS Axis= 39  deg  T Wave Axis= 30  deg  - ABNORMAL ECG -  Sinus rhythm  Probable LVH with secondary repol abnrm  Repolarization abnormalities  Borderline prolonged QT interval  When compared with ECG of 25-Jan-2024 15:38:31,  Significant axis, voltage or hypertrophy change  Electronically Signed By: Miguel Valenzuela ([facility]) 18-May-2024 16:31:03  Date and Time of Study: 2024-05-17 11:07:17  Signed    Echo 1/25/24:  ·  Left ventricular systolic function is normal. Left ventricular     ejection fraction appears to be 66 - 70%.  ·  There is moderate asymmetric basilar septal hypertrophy       the left ventricle with turbulence of flow.  The peak LVOT      gradient with Valsalva is 72 mmHg.The findings are      consistent with non-obstructive, hypertrophic      cardiomyopathy.  ·  There are no  significant valvular abnormalities.  ·  There is a trivial pericardial effusion, unchanged from previous echocardiogram.  There is no evidence of cardiac tamponade.                     Anesthesia Plan    ASA 3     general   total IV anesthesia  (Total IV Anesthesia    Patient understands anesthesia not responsible for dental damage.  )  intravenous induction     Anesthetic plan, risks, benefits, and alternatives have been provided, discussed and informed consent has been obtained with: patient.  Pre-procedure education provided  Plan discussed with CRNA.      CODE STATUS:

## 2024-10-01 ENCOUNTER — HOSPITAL ENCOUNTER (OUTPATIENT)
Facility: HOSPITAL | Age: 69
Setting detail: HOSPITAL OUTPATIENT SURGERY
Discharge: HOME | End: 2024-10-01
Attending: SURGERY | Admitting: SURGERY
Payer: MEDICARE

## 2024-10-01 ENCOUNTER — ANESTHESIA (OUTPATIENT)
Dept: GASTROENTEROLOGY | Facility: HOSPITAL | Age: 69
End: 2024-10-01
Payer: MEDICARE

## 2024-10-01 VITALS
BODY MASS INDEX: 37.43 KG/M2 | SYSTOLIC BLOOD PRESSURE: 133 MMHG | TEMPERATURE: 97.8 F | OXYGEN SATURATION: 98 % | WEIGHT: 231.92 LBS | HEART RATE: 58 BPM | RESPIRATION RATE: 18 BRPM | DIASTOLIC BLOOD PRESSURE: 65 MMHG

## 2024-10-01 DIAGNOSIS — Z12.11 SCREENING FOR MALIGNANT NEOPLASM OF COLON: ICD-10-CM

## 2024-10-01 PROCEDURE — 25010000002 PROPOFOL 10 MG/ML EMULSION

## 2024-10-01 PROCEDURE — 25810000003 LACTATED RINGERS PER 1000 ML: Performed by: NURSE ANESTHETIST, CERTIFIED REGISTERED

## 2024-10-01 PROCEDURE — 25010000002 LIDOCAINE PF 2% 2 % SOLUTION

## 2024-10-01 RX ORDER — SODIUM CHLORIDE, SODIUM LACTATE, POTASSIUM CHLORIDE, CALCIUM CHLORIDE 600; 310; 30; 20 MG/100ML; MG/100ML; MG/100ML; MG/100ML
30 INJECTION, SOLUTION INTRAVENOUS CONTINUOUS
Status: DISCONTINUED | OUTPATIENT
Start: 2024-10-01 | End: 2024-10-01 | Stop reason: HOSPADM

## 2024-10-01 RX ORDER — EPHEDRINE SULFATE 50 MG/ML
INJECTION, SOLUTION INTRAVENOUS AS NEEDED
Status: DISCONTINUED | OUTPATIENT
Start: 2024-10-01 | End: 2024-10-01 | Stop reason: SURG

## 2024-10-01 RX ORDER — SODIUM CHLORIDE 0.9 % (FLUSH) 0.9 %
10 SYRINGE (ML) INJECTION AS NEEDED
Status: DISCONTINUED | OUTPATIENT
Start: 2024-10-01 | End: 2024-10-01 | Stop reason: HOSPADM

## 2024-10-01 RX ORDER — PROPOFOL 10 MG/ML
VIAL (ML) INTRAVENOUS AS NEEDED
Status: DISCONTINUED | OUTPATIENT
Start: 2024-10-01 | End: 2024-10-01 | Stop reason: SURG

## 2024-10-01 RX ORDER — LIDOCAINE HYDROCHLORIDE 20 MG/ML
INJECTION, SOLUTION EPIDURAL; INFILTRATION; INTRACAUDAL; PERINEURAL AS NEEDED
Status: DISCONTINUED | OUTPATIENT
Start: 2024-10-01 | End: 2024-10-01 | Stop reason: SURG

## 2024-10-01 RX ORDER — SODIUM CHLORIDE 9 MG/ML
40 INJECTION, SOLUTION INTRAVENOUS AS NEEDED
Status: DISCONTINUED | OUTPATIENT
Start: 2024-10-01 | End: 2024-10-01 | Stop reason: HOSPADM

## 2024-10-01 RX ORDER — SODIUM CHLORIDE 0.9 % (FLUSH) 0.9 %
3 SYRINGE (ML) INJECTION EVERY 12 HOURS SCHEDULED
Status: DISCONTINUED | OUTPATIENT
Start: 2024-10-01 | End: 2024-10-01 | Stop reason: HOSPADM

## 2024-10-01 RX ADMIN — EPHEDRINE SULFATE 10 MG: 50 INJECTION INTRAVENOUS at 13:17

## 2024-10-01 RX ADMIN — PROPOFOL 175 MCG/KG/MIN: 10 INJECTION, EMULSION INTRAVENOUS at 13:14

## 2024-10-01 RX ADMIN — SODIUM CHLORIDE, POTASSIUM CHLORIDE, SODIUM LACTATE AND CALCIUM CHLORIDE 30 ML/HR: 600; 310; 30; 20 INJECTION, SOLUTION INTRAVENOUS at 12:25

## 2024-10-01 RX ADMIN — PROPOFOL 70 MG: 10 INJECTION, EMULSION INTRAVENOUS at 13:13

## 2024-10-01 RX ADMIN — LIDOCAINE HYDROCHLORIDE 50 MG: 20 INJECTION, SOLUTION EPIDURAL; INFILTRATION; INTRACAUDAL; PERINEURAL at 13:13

## 2024-10-01 NOTE — ANESTHESIA POSTPROCEDURE EVALUATION
Patient: Elysia Babcock    Procedure Summary       Date: 10/01/24 Room / Location: [facility] ENDOSCOPY 2 / [facility] ENDOSCOPY    Anesthesia Start: 1310 Anesthesia Stop: 1335    Procedure: COLONOSCOPY Diagnosis:       Screening for malignant neoplasm of colon      (Screening for malignant neoplasm of colon [Z12.11])    Surgeons: Michael Mcknight MD Provider: Uma Mason CRNA    Anesthesia Type: general ASA Status: 3            Anesthesia Type: general    Vitals  Vitals Value Taken Time   /57 10/01/24 1333   Temp 36.6 °C (97.8 °F) 10/01/24 1333   Pulse 70 10/01/24 1336   Resp 19 10/01/24 1333   SpO2 97 % 10/01/24 1336   Vitals shown include unfiled device data.        Post Anesthesia Care and Evaluation    Patient location during evaluation: bedside  Patient participation: complete - patient participated  Level of consciousness: awake  Pain management: adequate    Airway patency: patent  Anesthetic complications: No anesthetic complications  PONV Status: controlled  Cardiovascular status: acceptable and stable  Respiratory status: acceptable, spontaneous ventilation and room air

## 2024-10-01 NOTE — H&P
General Surgery/Colorectal Surgery Note    Patient Name:  Elysia Babcock  YOB: 1955  [id number]    Referring Provider: Michael Mcknight, *      Patient Care Team:  Devora Johnson DO as PCP - General (Family Medicine)  Zenon Medina MD as Consulting Physician (Pulmonary Disease)    Subjective .     History of present illness:    HPI   referral from Dr. Devora Johnson for colonoscopy consultation. Patient denies any abdominal pain, change in bowel habit, or rectal bleeding. Denies any family history of colorectal cancer. Patient reports last colonoscopy was in her 90s and had some polyps removed.     History:  Past Medical History:   Diagnosis Date    Allergic     Arthritis     Asthma     CHF (congestive heart failure)     Clotting disorder     Congenital heart disease     Coronary artery disease     Depression     Heart murmur     Hyperlipidemia     Hypertension     Obesity     Pleural effusion     Pneumonia 2023    Pulmonary arterial hypertension 2024    Rheumatoid arthritis     Visual impairment        Past Surgical History:   Procedure Laterality Date     SECTION      CHOLECYSTECTOMY      COLONOSCOPY      CORONARY STENT PLACEMENT      HIP BIPOLAR REPLACEMENT      JOINT REPLACEMENT      TONSILLECTOMY         Family History   Problem Relation Age of Onset    Asthma Mother     Depression Mother     Hyperlipidemia Mother     Heart disease Father     Heart failure Father        Social History     Tobacco Use    Smoking status: Never     Passive exposure: Never    Smokeless tobacco: Never    Tobacco comments:     No second hand smoke exposure    Vaping Use    Vaping status: Never Used   Substance Use Topics    Alcohol use: Yes     Alcohol/week: 6.0 standard drinks of alcohol     Types: 3 Glasses of wine, 3 Drinks containing 0.5 oz of alcohol per week     Comment: I enjoy happy hour.    Drug use: Never       Review of Systems: See HPI    Review of Systems             Medications Prior to Admission   Medication Sig Dispense Refill Last Dose    albuterol sulfate  (90 Base) MCG/ACT inhaler Inhale 2 puffs Every 4 (Four) Hours As Needed for Wheezing. 18 g 0     alendronate (FOSAMAX) 70 MG tablet TAKE 1 TABLET BY MOUTH EVERY 7 DAYS 12 tablet 0     aspirin 81 MG EC tablet Take 1 tablet by mouth Daily.       azithromycin (ZITHROMAX) 250 MG tablet Take 2 by mouth today then 1 daily for 4 days 6 tablet 0     cefdinir (OMNICEF) 300 MG capsule Take 1 capsule by mouth 2 (Two) Times a Day. 14 capsule 0     FLUoxetine (PROzac) 40 MG capsule TAKE 1 CAPSULE BY MOUTH DAILY 30 capsule 3     furosemide (Lasix) 40 MG tablet Take 1 tablet by mouth Daily. 30 tablet 3     ibuprofen (ADVIL,MOTRIN) 400 MG tablet Take 1 tablet by mouth Every 6 (Six) Hours As Needed for Mild Pain. (Patient not taking: Reported on 9/4/2024)       ipratropium-albuterol (DUO-NEB) 0.5-2.5 mg/3 ml nebulizer USE 3 ML VIA NEBULIZER EVERY 4 HOURS AS NEEDED FOR WHEEZING (Patient not taking: Reported on 5/28/2024)       metoprolol tartrate (LOPRESSOR) 50 MG tablet Take 1 tablet by mouth 2 (Two) Times a Day. 180 tablet 3     sodium-potassium-magnesium sulfates (Suprep Bowel Prep Kit) 17.5-3.13-1.6 GM/177ML solution oral solution Take as directed.  Instructions given in office.  Dispense: 2 bottles (Patient not taking: Reported on 4/17/2024) 354 mL 0     spironolactone (ALDACTONE) 25 MG tablet Take 1 tablet by mouth Daily. 90 tablet 3     vitamin D3 125 MCG (5000 UT) capsule capsule Take 1 capsule by mouth Daily.            Home Meds:      Prior to Admission medications    Medication Sig Start Date End Date Taking? Authorizing Provider   albuterol sulfate  (90 Base) MCG/ACT inhaler Inhale 2 puffs Every 4 (Four) Hours As Needed for Wheezing. 9/6/23   Jamila Burrows APRN   alendronate (FOSAMAX) 70 MG tablet TAKE 1 TABLET BY MOUTH EVERY 7 DAYS 5/13/24   Devora Johnson DO   aspirin 81 MG EC tablet Take 1 tablet by  mouth Daily.    Emergency, Nurse Epic, RN   azithromycin (ZITHROMAX) 250 MG tablet Take 2 by mouth today then 1 daily for 4 days 9/4/24   Zenon Medina MD   cefdinir (OMNICEF) 300 MG capsule Take 1 capsule by mouth 2 (Two) Times a Day. 4/18/24   Alicia Murphy APRN   FLUoxetine (PROzac) 40 MG capsule TAKE 1 CAPSULE BY MOUTH DAILY 9/25/24   Devora Johnson DO   furosemide (Lasix) 40 MG tablet Take 1 tablet by mouth Daily. 7/26/24   GENESIS Gan MD   ibuprofen (ADVIL,MOTRIN) 400 MG tablet Take 1 tablet by mouth Every 6 (Six) Hours As Needed for Mild Pain.  Patient not taking: Reported on 9/4/2024    ProviderCarin MD   ipratropium-albuterol (DUO-NEB) 0.5-2.5 mg/3 ml nebulizer USE 3 ML VIA NEBULIZER EVERY 4 HOURS AS NEEDED FOR WHEEZING  Patient not taking: Reported on 5/28/2024 10/16/23   Carin Turner MD   metoprolol tartrate (LOPRESSOR) 50 MG tablet Take 1 tablet by mouth 2 (Two) Times a Day. 2/26/24   Deena Casarez APRN   sodium-potassium-magnesium sulfates (Suprep Bowel Prep Kit) 17.5-3.13-1.6 GM/177ML solution oral solution Take as directed.  Instructions given in office.  Dispense: 2 bottles  Patient not taking: Reported on 4/17/2024 4/16/24   Ale Garza APRN   spironolactone (ALDACTONE) 25 MG tablet Take 1 tablet by mouth Daily. 7/26/24   GENESIS Gan MD   vitamin D3 125 MCG (5000 UT) capsule capsule Take 1 capsule by mouth Daily.    Provider, MD Carin            Allergies:  Patient has no known allergies.      Objective     Vital Signs        Physical Exam:     Physical Exam    NAD, A/O x 4, normal circulation, normal respiration      Result Review :     Assessment & Plan     There are no diagnoses linked to this encounter.       Risks including bleeding, perforation, pain, infection, missed polyp(s). Benefits, and alternatives of Colonoscopy discussed with patient.  All questions answered.  Consent verified.         Michael Mcknight MD  10/01/24 12:08 EDT

## 2024-10-25 RX ORDER — FUROSEMIDE 40 MG
40 TABLET ORAL DAILY
Qty: 30 TABLET | Refills: 3 | Status: SHIPPED | OUTPATIENT
Start: 2024-10-25

## 2024-10-29 ENCOUNTER — OFFICE VISIT (OUTPATIENT)
Dept: CARDIOLOGY | Facility: CLINIC | Age: 69
End: 2024-10-29
Payer: MEDICARE

## 2024-10-29 VITALS
WEIGHT: 244 LBS | HEIGHT: 66 IN | SYSTOLIC BLOOD PRESSURE: 123 MMHG | HEART RATE: 79 BPM | DIASTOLIC BLOOD PRESSURE: 79 MMHG | BODY MASS INDEX: 39.21 KG/M2

## 2024-10-29 DIAGNOSIS — R06.09 DYSPNEA ON EXERTION: Primary | ICD-10-CM

## 2024-10-29 DIAGNOSIS — I42.2 HYPERTROPHIC CARDIOMYOPATHY: ICD-10-CM

## 2024-10-29 DIAGNOSIS — I10 HYPERTENSION, ESSENTIAL: ICD-10-CM

## 2024-10-29 RX ORDER — SACUBITRIL AND VALSARTAN 24; 26 MG/1; MG/1
1 TABLET, FILM COATED ORAL 2 TIMES DAILY
Qty: 180 TABLET | Refills: 3 | Status: SHIPPED | OUTPATIENT
Start: 2024-10-29

## 2024-10-29 RX ORDER — PREDNISOLONE ACETATE 10 MG/ML
SUSPENSION/ DROPS OPHTHALMIC
COMMUNITY
Start: 2024-10-24

## 2024-10-29 RX ORDER — ACETAMINOPHEN 500 MG
500 TABLET ORAL EVERY 6 HOURS PRN
COMMUNITY

## 2024-10-29 NOTE — PROGRESS NOTES
Chief Complaint  Cardiomyopathy, stephens, Hypertension, and Palpitations    Subjective            Elysia Babcock presents to [facility] CARDIOLOGY  History of Present Illness    Ms. Babcock is here for follow-up evaluation and management of hypertrophic nonobstructive cardiomyopathy, essential hypertension.  Since adding Aldactone, she does not notice much difference in her shortness of breath.  She actually feels like that is getting worse.  She is limited with even minimal exertion due to the shortness of breath and it is also accompanied sometimes with chest pressure.  She is retaining fluid in her lower extremities and feels the Lasix is not working as good as it used to.    Most recent echocardiogram from February showed normal biventricular function, moderate LVH with no significant intra cavitary gradient.  Diastolic dysfunction.    PMH  Past Medical History:   Diagnosis Date    Allergic     Arthritis     Asthma     CHF (congestive heart failure)     Clotting disorder     Congenital heart disease     Coronary artery disease     Depression     Heart murmur     Hyperlipidemia     Hypertension     Obesity     Pleural effusion     Pneumonia 2023    Pulmonary arterial hypertension 2024    Rheumatoid arthritis     Visual impairment          SURGICALHX  Past Surgical History:   Procedure Laterality Date     SECTION      CHOLECYSTECTOMY      COLONOSCOPY      COLONOSCOPY N/A 10/1/2024    Procedure: COLONOSCOPY;  Surgeon: Michael Mcknight MD;  Location: [facility] ENDOSCOPY;  Service: General;  Laterality: N/A;  diverticulosis    CORONARY STENT PLACEMENT      HIP BIPOLAR REPLACEMENT      JOINT REPLACEMENT      TONSILLECTOMY          SOC  Social History     Socioeconomic History    Marital status:    Tobacco Use    Smoking status: Never     Passive exposure: Never    Smokeless tobacco: Never    Tobacco comments:     No second hand smoke exposure    Vaping Use     Vaping status: Never Used   Substance and Sexual Activity    Alcohol use: Yes     Alcohol/week: 6.0 standard drinks of alcohol     Types: 3 Glasses of wine, 3 Drinks containing 0.5 oz of alcohol per week     Comment: I enjoy happy hour.    Drug use: Never    Sexual activity: Not Currently     Partners: Male     Birth control/protection: None     Comment: I have been off the pill for 25 years.         FAMHX  Family History   Problem Relation Age of Onset    Asthma Mother     Depression Mother     Hyperlipidemia Mother     Heart disease Father     Heart failure Father           ALLERGY  No Known Allergies     MEDSCURRENT    Current Outpatient Medications:     acetaminophen (TYLENOL) 500 MG tablet, Take 1 tablet by mouth Every 6 (Six) Hours As Needed for Mild Pain., Disp: , Rfl:     albuterol sulfate  (90 Base) MCG/ACT inhaler, Inhale 2 puffs Every 4 (Four) Hours As Needed for Wheezing., Disp: 18 g, Rfl: 0    alendronate (FOSAMAX) 70 MG tablet, TAKE 1 TABLET BY MOUTH EVERY 7 DAYS, Disp: 12 tablet, Rfl: 0    aspirin 81 MG EC tablet, Take 1 tablet by mouth Daily., Disp: , Rfl:     FLUoxetine (PROzac) 40 MG capsule, TAKE 1 CAPSULE BY MOUTH DAILY, Disp: 30 capsule, Rfl: 3    furosemide (LASIX) 40 MG tablet, TAKE 1 TABLET BY MOUTH DAILY, Disp: 30 tablet, Rfl: 3    metoprolol tartrate (LOPRESSOR) 50 MG tablet, Take 1 tablet by mouth 2 (Two) Times a Day., Disp: 180 tablet, Rfl: 3    prednisoLONE acetate (PRED FORTE) 1 % ophthalmic suspension, , Disp: , Rfl:     spironolactone (ALDACTONE) 25 MG tablet, Take 1 tablet by mouth Daily., Disp: 90 tablet, Rfl: 3    vitamin D3 125 MCG (5000 UT) capsule capsule, Take 1 capsule by mouth Daily., Disp: , Rfl:     sacubitril-valsartan (Entresto) 24-26 MG tablet, Take 1 tablet by mouth 2 (Two) Times a Day., Disp: 180 tablet, Rfl: 3      Review of Systems   Cardiovascular:  Positive for chest pain, dyspnea on exertion, leg swelling and orthopnea. Negative for syncope.     "    Objective     /79   Pulse 79   Ht 167.6 cm (66\")   Wt 111 kg (244 lb)   BMI 39.38 kg/m²       General Appearance:   well developed  well nourished  HENT:   oropharynx moist  lips not cyanotic  Neck:  thyroid not enlarged  supple  Respiratory:  no respiratory distress  normal breath sounds  no rales  Cardiovascular:  no jugular venous distention  regular rhythm  apical impulse normal  S1 normal, S2 normal  no S3, no S4   Systolic murmur  no rub, no thrill  carotid pulses normal; no bruit  pedal pulses normal  lower extremity edema: 1+ pitting, right greater than left  Musculoskeletal:  no clubbing of fingers.   normocephalic, head atraumatic  Skin:   warm, dry  Psychiatric:  judgement and insight appropriate  normal mood and affect      Result Review :     The following data was reviewed by: JERSON Urias on 10/29/2024:    CMP          1/25/2024    11:12 4/19/2024    09:02 5/17/2024    10:53   CMP   Glucose 104   117    BUN 18   17    Creatinine 0.83  0.90  0.83    EGFR 76.4  69.3  76.4    Sodium 138   137    Potassium 4.4   4.0    Chloride 103   103    Calcium 9.5   8.9    Total Protein 6.9   6.7    Albumin 4.1   3.9    Globulin 2.8   2.8    Total Bilirubin 0.8   1.0    Alkaline Phosphatase 70   60    AST (SGOT) 12   17    ALT (SGPT) 9   13    Albumin/Globulin Ratio 1.5   1.4    BUN/Creatinine Ratio 21.7   20.5    Anion Gap 10.9   11.8      CBC          1/25/2024    11:12 5/17/2024    10:53   CBC   WBC 5.86  6.57    RBC 3.82  3.62    Hemoglobin 12.7  12.1    Hematocrit 38.6  37.4    .0  103.3    MCH 33.2  33.4    MCHC 32.9  32.4    RDW 14.8  15.8    Platelets 161  181                 Procedures      Elysia PAL Sadiq  reports that she has never smoked. She has never been exposed to tobacco smoke. She has never used smokeless tobacco. I have educated her on the risk of diseases from using tobacco products such as cancer, COPD, and heart disease.          Assessment and Plan  "       ASSESSMENT:  Encounter Diagnoses   Name Primary?    Dyspnea on exertion Yes    Hypertrophic cardiomyopathy     Hypertension, essential          PLAN:    1.  Hypertropic nonobstructive cardiomyopathy, she reports worsening dyspnea, orthopnea, and fluid retention.  Symptoms secondary most likely to diastolic dysfunction.  I am going to add Entresto 24/26 twice daily.  Continue Aldactone.  She will increase her Lasix for 3 days and then resume normal schedule.  Continue beta-blocker.  Check a BMP in 2 weeks.  2.  Dyspnea on exertion, as above.  Will also schedule stress imaging in case of angina equivalent.  The dyspnea is also sometimes accompanied by chest pressure.  3.  Essential hypertension, stable.  Adjustments made as above.    Will follow-up after stress test.      Patient was given instructions and counseling regarding her condition or for health maintenance advice. Please see specific information pulled into the AVS if appropriate.           Kaitlin Martinez, APRN   10/29/2024  13:51 EDT

## 2024-11-11 DIAGNOSIS — M85.89 OSTEOPENIA OF MULTIPLE SITES: ICD-10-CM

## 2024-11-11 RX ORDER — ALENDRONATE SODIUM 70 MG/1
70 TABLET ORAL
Qty: 12 TABLET | Refills: 0 | Status: SHIPPED | OUTPATIENT
Start: 2024-11-11

## 2024-11-20 ENCOUNTER — HOSPITAL ENCOUNTER (OUTPATIENT)
Facility: HOSPITAL | Age: 69
Discharge: HOME | End: 2024-11-20
Payer: MEDICARE

## 2024-11-20 ENCOUNTER — LAB (OUTPATIENT)
Dept: LAB | Facility: HOSPITAL | Age: 69
End: 2024-11-20
Payer: MEDICARE

## 2024-11-20 ENCOUNTER — OFFICE VISIT (OUTPATIENT)
Dept: FAMILY MEDICINE CLINIC | Facility: CLINIC | Age: 69
End: 2024-11-20
Payer: MEDICARE

## 2024-11-20 VITALS
WEIGHT: 240.8 LBS | DIASTOLIC BLOOD PRESSURE: 70 MMHG | TEMPERATURE: 98.4 F | HEART RATE: 59 BPM | SYSTOLIC BLOOD PRESSURE: 130 MMHG | HEIGHT: 66 IN | OXYGEN SATURATION: 97 % | BODY MASS INDEX: 38.7 KG/M2

## 2024-11-20 DIAGNOSIS — Z13.220 SCREENING FOR LIPID DISORDERS: ICD-10-CM

## 2024-11-20 DIAGNOSIS — M25.50 ARTHRALGIA, UNSPECIFIED JOINT: ICD-10-CM

## 2024-11-20 DIAGNOSIS — R22.41 LOCALIZED SWELLING OF RIGHT LOWER LEG: ICD-10-CM

## 2024-11-20 DIAGNOSIS — Z13.29 SCREENING FOR THYROID DISORDER: ICD-10-CM

## 2024-11-20 DIAGNOSIS — E66.812 CLASS 2 OBESITY WITHOUT SERIOUS COMORBIDITY WITH BODY MASS INDEX (BMI) OF 38.0 TO 38.9 IN ADULT, UNSPECIFIED OBESITY TYPE: ICD-10-CM

## 2024-11-20 DIAGNOSIS — R06.09 DYSPNEA ON EXERTION: ICD-10-CM

## 2024-11-20 DIAGNOSIS — Z00.00 MEDICARE ANNUAL WELLNESS VISIT, INITIAL: ICD-10-CM

## 2024-11-20 DIAGNOSIS — I42.2 HYPERTROPHIC CARDIOMYOPATHY: ICD-10-CM

## 2024-11-20 DIAGNOSIS — Z11.59 NEED FOR HEPATITIS C SCREENING TEST: ICD-10-CM

## 2024-11-20 DIAGNOSIS — R06.02 SOB (SHORTNESS OF BREATH): ICD-10-CM

## 2024-11-20 DIAGNOSIS — Z13.29 SCREENING FOR THYROID DISORDER: Primary | ICD-10-CM

## 2024-11-20 LAB
ALBUMIN SERPL-MCNC: 4.1 G/DL (ref 3.5–5.2)
ALBUMIN/GLOB SERPL: 1.4 G/DL
ALP SERPL-CCNC: 65 U/L (ref 39–117)
ALT SERPL W P-5'-P-CCNC: 17 U/L (ref 1–33)
ANION GAP SERPL CALCULATED.3IONS-SCNC: 9.4 MMOL/L (ref 5–15)
AST SERPL-CCNC: 19 U/L (ref 1–32)
BASOPHILS # BLD AUTO: 0.04 10*3/MM3 (ref 0–0.2)
BASOPHILS NFR BLD AUTO: 0.6 % (ref 0–1.5)
BH CV LOWER VASCULAR LEFT COMMON FEMORAL AUGMENT: NORMAL
BH CV LOWER VASCULAR LEFT COMMON FEMORAL COMPETENT: NORMAL
BH CV LOWER VASCULAR LEFT COMMON FEMORAL COMPRESS: NORMAL
BH CV LOWER VASCULAR LEFT COMMON FEMORAL PHASIC: NORMAL
BH CV LOWER VASCULAR LEFT COMMON FEMORAL SPONT: NORMAL
BH CV LOWER VASCULAR RIGHT COMMON FEMORAL AUGMENT: NORMAL
BH CV LOWER VASCULAR RIGHT COMMON FEMORAL COMPETENT: NORMAL
BH CV LOWER VASCULAR RIGHT COMMON FEMORAL COMPRESS: NORMAL
BH CV LOWER VASCULAR RIGHT COMMON FEMORAL PHASIC: NORMAL
BH CV LOWER VASCULAR RIGHT COMMON FEMORAL SPONT: NORMAL
BH CV LOWER VASCULAR RIGHT DISTAL FEMORAL COMPRESS: NORMAL
BH CV LOWER VASCULAR RIGHT GASTRONEMIUS COMPRESS: NORMAL
BH CV LOWER VASCULAR RIGHT GREATER SAPH AK COMPRESS: NORMAL
BH CV LOWER VASCULAR RIGHT GREATER SAPH BK COMPRESS: NORMAL
BH CV LOWER VASCULAR RIGHT LESSER SAPH COMPRESS: NORMAL
BH CV LOWER VASCULAR RIGHT MID FEMORAL AUGMENT: NORMAL
BH CV LOWER VASCULAR RIGHT MID FEMORAL COMPETENT: NORMAL
BH CV LOWER VASCULAR RIGHT MID FEMORAL COMPRESS: NORMAL
BH CV LOWER VASCULAR RIGHT MID FEMORAL PHASIC: NORMAL
BH CV LOWER VASCULAR RIGHT MID FEMORAL SPONT: NORMAL
BH CV LOWER VASCULAR RIGHT PERONEAL COMPRESS: NORMAL
BH CV LOWER VASCULAR RIGHT POPLITEAL AUGMENT: NORMAL
BH CV LOWER VASCULAR RIGHT POPLITEAL COMPETENT: NORMAL
BH CV LOWER VASCULAR RIGHT POPLITEAL COMPRESS: NORMAL
BH CV LOWER VASCULAR RIGHT POPLITEAL PHASIC: NORMAL
BH CV LOWER VASCULAR RIGHT POPLITEAL SPONT: NORMAL
BH CV LOWER VASCULAR RIGHT POSTERIOR TIBIAL COMPRESS: NORMAL
BH CV LOWER VASCULAR RIGHT PROXIMAL FEMORAL COMPRESS: NORMAL
BILIRUB SERPL-MCNC: 0.6 MG/DL (ref 0–1.2)
BUN SERPL-MCNC: 29 MG/DL (ref 8–23)
BUN/CREAT SERPL: 29.9 (ref 7–25)
CALCIUM SPEC-SCNC: 9.7 MG/DL (ref 8.6–10.5)
CHLORIDE SERPL-SCNC: 102 MMOL/L (ref 98–107)
CHOLEST SERPL-MCNC: 256 MG/DL (ref 0–200)
CK SERPL-CCNC: 39 U/L (ref 20–180)
CO2 SERPL-SCNC: 24.6 MMOL/L (ref 22–29)
CREAT SERPL-MCNC: 0.97 MG/DL (ref 0.57–1)
CRP SERPL-MCNC: 0.49 MG/DL (ref 0–0.5)
DEPRECATED RDW RBC AUTO: 49.3 FL (ref 37–54)
EGFRCR SERPLBLD CKD-EPI 2021: 63 ML/MIN/1.73
EOSINOPHIL # BLD AUTO: 0.19 10*3/MM3 (ref 0–0.4)
EOSINOPHIL NFR BLD AUTO: 3 % (ref 0.3–6.2)
ERYTHROCYTE [DISTWIDTH] IN BLOOD BY AUTOMATED COUNT: 13.6 % (ref 12.3–15.4)
ERYTHROCYTE [SEDIMENTATION RATE] IN BLOOD: 8 MM/HR (ref 0–30)
GLOBULIN UR ELPH-MCNC: 2.9 GM/DL
GLUCOSE SERPL-MCNC: 98 MG/DL (ref 65–99)
HCT VFR BLD AUTO: 40.6 % (ref 34–46.6)
HCV AB SER QL: NORMAL
HDLC SERPL-MCNC: 58 MG/DL (ref 40–60)
HGB BLD-MCNC: 13.4 G/DL (ref 12–15.9)
IMM GRANULOCYTES # BLD AUTO: 0.01 10*3/MM3 (ref 0–0.05)
IMM GRANULOCYTES NFR BLD AUTO: 0.2 % (ref 0–0.5)
LDLC SERPL CALC-MCNC: 179 MG/DL (ref 0–100)
LDLC/HDLC SERPL: 3.05 {RATIO}
LYMPHOCYTES # BLD AUTO: 1.4 10*3/MM3 (ref 0.7–3.1)
LYMPHOCYTES NFR BLD AUTO: 22.2 % (ref 19.6–45.3)
MCH RBC QN AUTO: 32.8 PG (ref 26.6–33)
MCHC RBC AUTO-ENTMCNC: 33 G/DL (ref 31.5–35.7)
MCV RBC AUTO: 99.5 FL (ref 79–97)
MONOCYTES # BLD AUTO: 0.85 10*3/MM3 (ref 0.1–0.9)
MONOCYTES NFR BLD AUTO: 13.5 % (ref 5–12)
NEUTROPHILS NFR BLD AUTO: 3.82 10*3/MM3 (ref 1.7–7)
NEUTROPHILS NFR BLD AUTO: 60.5 % (ref 42.7–76)
NRBC BLD AUTO-RTO: 0 /100 WBC (ref 0–0.2)
NT-PROBNP SERPL-MCNC: 1348 PG/ML (ref 0–900)
PLATELET # BLD AUTO: 163 10*3/MM3 (ref 140–450)
PMV BLD AUTO: 11.9 FL (ref 6–12)
POTASSIUM SERPL-SCNC: 4.3 MMOL/L (ref 3.5–5.2)
PROT SERPL-MCNC: 7 G/DL (ref 6–8.5)
RBC # BLD AUTO: 4.08 10*6/MM3 (ref 3.77–5.28)
SODIUM SERPL-SCNC: 136 MMOL/L (ref 136–145)
TRIGL SERPL-MCNC: 106 MG/DL (ref 0–150)
TSH SERPL DL<=0.05 MIU/L-ACNC: 2.48 UIU/ML (ref 0.27–4.2)
VLDLC SERPL-MCNC: 19 MG/DL (ref 5–40)
WBC NRBC COR # BLD AUTO: 6.31 10*3/MM3 (ref 3.4–10.8)

## 2024-11-20 PROCEDURE — 86038 ANTINUCLEAR ANTIBODIES: CPT

## 2024-11-20 PROCEDURE — 1125F AMNT PAIN NOTED PAIN PRSNT: CPT

## 2024-11-20 PROCEDURE — 99213 OFFICE O/P EST LOW 20 MIN: CPT

## 2024-11-20 PROCEDURE — 3078F DIAST BP <80 MM HG: CPT

## 2024-11-20 PROCEDURE — 36415 COLL VENOUS BLD VENIPUNCTURE: CPT

## 2024-11-20 PROCEDURE — 86140 C-REACTIVE PROTEIN: CPT

## 2024-11-20 PROCEDURE — 3075F SYST BP GE 130 - 139MM HG: CPT

## 2024-11-20 PROCEDURE — 83880 ASSAY OF NATRIURETIC PEPTIDE: CPT

## 2024-11-20 PROCEDURE — 84443 ASSAY THYROID STIM HORMONE: CPT

## 2024-11-20 PROCEDURE — 80053 COMPREHEN METABOLIC PANEL: CPT

## 2024-11-20 PROCEDURE — 80061 LIPID PANEL: CPT

## 2024-11-20 PROCEDURE — 82550 ASSAY OF CK (CPK): CPT

## 2024-11-20 PROCEDURE — 85025 COMPLETE CBC W/AUTO DIFF WBC: CPT

## 2024-11-20 PROCEDURE — 85652 RBC SED RATE AUTOMATED: CPT

## 2024-11-20 PROCEDURE — 1170F FXNL STATUS ASSESSED: CPT

## 2024-11-20 PROCEDURE — 93971 EXTREMITY STUDY: CPT

## 2024-11-20 PROCEDURE — 86803 HEPATITIS C AB TEST: CPT

## 2024-11-20 PROCEDURE — G0438 PPPS, INITIAL VISIT: HCPCS

## 2024-11-20 PROCEDURE — 86225 DNA ANTIBODY NATIVE: CPT

## 2024-11-20 NOTE — ASSESSMENT & PLAN NOTE
Patient's (Body mass index is 38.87 kg/m².) indicates that they are morbidly/severely obese (BMI > 40 or > 35 with obesity - related health condition) with health conditions that include hypertension and coronary heart disease . Weight is unchanged. BMI  is above average; BMI management plan is completed. We discussed portion control and increasing exercise.

## 2024-11-20 NOTE — PROGRESS NOTES
Subjective   The ABCs of the Annual Wellness Visit  Medicare Wellness Visit      Elysia Babcock is a 69 y.o. patient who presents for a Medicare Wellness Visit.    The following portions of the patient's history were reviewed and   updated as appropriate: allergies, current medications, past family history, past medical history, past social history, past surgical history, and problem list.    Compared to one year ago, the patient's physical   health is worse.  Compared to one year ago, the patient's mental   health is the same.    Recent Hospitalizations:  She was not admitted to the hospital during the last year.     Current Medical Providers:  Patient Care Team:  Devora Johnson DO as PCP - General (Family Medicine)  Zenon Medina MD as Consulting Physician (Pulmonary Disease)    Outpatient Medications Prior to Visit   Medication Sig Dispense Refill    acetaminophen (TYLENOL) 500 MG tablet Take 1 tablet by mouth Every 6 (Six) Hours As Needed for Mild Pain.      albuterol sulfate  (90 Base) MCG/ACT inhaler Inhale 2 puffs Every 4 (Four) Hours As Needed for Wheezing. 18 g 0    alendronate (FOSAMAX) 70 MG tablet TAKE 1 TABLET BY MOUTH EVERY 7 DAYS 12 tablet 0    aspirin 81 MG EC tablet Take 1 tablet by mouth Daily.      FLUoxetine (PROzac) 40 MG capsule TAKE 1 CAPSULE BY MOUTH DAILY 30 capsule 3    furosemide (LASIX) 40 MG tablet TAKE 1 TABLET BY MOUTH DAILY 30 tablet 3    metoprolol tartrate (LOPRESSOR) 50 MG tablet Take 1 tablet by mouth 2 (Two) Times a Day. 180 tablet 3    prednisoLONE acetate (PRED FORTE) 1 % ophthalmic suspension       sacubitril-valsartan (Entresto) 24-26 MG tablet Take 1 tablet by mouth 2 (Two) Times a Day. 180 tablet 3    spironolactone (ALDACTONE) 25 MG tablet Take 1 tablet by mouth Daily. 90 tablet 3    vitamin D3 125 MCG (5000 UT) capsule capsule Take 1 capsule by mouth Daily.       No facility-administered medications prior to visit.     No opioid medication identified on active  "medication list. I have reviewed chart for other potential  high risk medication/s and harmful drug interactions in the elderly.      Aspirin is on active medication list. Aspirin use is indicated based on review of current medical condition/s. Pros and cons of this therapy have been discussed today. Benefits of this medication outweigh potential harm.  Patient has been encouraged to continue taking this medication.  .      Patient Active Problem List   Diagnosis    Class 2 obesity without serious comorbidity with body mass index (BMI) of 38.0 to 38.9 in adult    Vitamin D deficiency    Coronary artery disease involving native coronary artery of native heart without angina pectoris    Moderate episode of recurrent major depressive disorder    Primary hypertension    Mixed hyperlipidemia    Hypertrophic cardiomyopathy    Lobar pneumonia    Osteopenia of multiple sites    Pericardial effusion    Screening for malignant neoplasm of colon     Advance Care Planning Advance Directive is not on file.  ACP discussion was held with the patient during this visit. Patient does not have an advance directive, declines further assistance.            Objective   Vitals:    11/20/24 1342   BP: 130/70   BP Location: Left arm   Patient Position: Sitting   Cuff Size: Adult   Pulse: 59   Temp: 98.4 °F (36.9 °C)   TempSrc: Temporal   SpO2: 97%   Weight: 109 kg (240 lb 12.8 oz)   Height: 167.6 cm (66\")   PainSc:   4   PainLoc: Leg       Estimated body mass index is 38.87 kg/m² as calculated from the following:    Height as of this encounter: 167.6 cm (66\").    Weight as of this encounter: 109 kg (240 lb 12.8 oz).    Class 2 Severe Obesity (BMI >=35 and <=39.9). Obesity-related health conditions include the following: hypertension and coronary heart disease. Obesity is unchanged. BMI is is above average; BMI management plan is completed. We discussed portion control and increasing exercise.       Does the patient have evidence of " cognitive impairment? No                                                                                                Health  Risk Assessment    Smoking Status:  Social History     Tobacco Use   Smoking Status Never    Passive exposure: Never   Smokeless Tobacco Never   Tobacco Comments    No second hand smoke exposure      Alcohol Consumption:  Social History     Substance and Sexual Activity   Alcohol Use Yes    Alcohol/week: 6.0 standard drinks of alcohol    Types: 3 Glasses of wine, 3 Drinks containing 0.5 oz of alcohol per week    Comment: I enjoy happy hour.       Fall Risk Screen  JOAN Fall Risk Assessment was completed, and patient is at LOW risk for falls.Assessment completed on:2024    Depression Screening   Little interest or pleasure in doing things? Not at all   Feeling down, depressed, or hopeless? Not at all   PHQ-2 Total Score 0      Health Habits and Functional and Cognitive Screenin/20/2024     1:41 PM   Functional & Cognitive Status   Do you have difficulty preparing food and eating? No   Do you have difficulty bathing yourself, getting dressed or grooming yourself? No   Do you have difficulty using the toilet? No   Do you have difficulty moving around from place to place? No   Do you have trouble with steps or getting out of a bed or a chair? No   Current Diet Well Balanced Diet   Dental Exam Not up to date   Eye Exam Up to date   Exercise (times per week) 0 times per week   Current Exercises Include No Regular Exercise   Do you need help using the phone?  No   Are you deaf or do you have serious difficulty hearing?  No   Do you need help to go to places out of walking distance? No   Do you need help shopping? No   Do you need help preparing meals?  No   Do you need help with housework?  No   Do you need help with laundry? No   Do you need help taking your medications? No   Do you need help managing money? No   Do you ever drive or ride in a car without wearing a seat belt? No    Have you felt unusual stress, anger or loneliness in the last month? No   Who do you live with? Child   If you need help, do you have trouble finding someone available to you? No   Have you been bothered in the last four weeks by sexual problems? No   Do you have difficulty concentrating, remembering or making decisions? No           Age-appropriate Screening Schedule:  Refer to the list below for future screening recommendations based on patient's age, sex and/or medical conditions. Orders for these recommended tests are listed in the plan section. The patient has been provided with a written plan.    Health Maintenance List  Health Maintenance   Topic Date Due    ZOSTER VACCINE (1 of 2) Never done    HEPATITIS C SCREENING  Never done    LIPID PANEL  06/05/2024    TDAP/TD VACCINES (1 - Tdap) 04/17/2025 (Originally 6/25/1974)    MAMMOGRAM  08/07/2025    DXA SCAN  08/07/2025    ANNUAL WELLNESS VISIT  11/20/2025    BMI FOLLOWUP  11/20/2025    COLORECTAL CANCER SCREENING  10/01/2029    COVID-19 Vaccine  Completed    INFLUENZA VACCINE  Completed    Pneumococcal Vaccine 65+  Completed                                                                                                                                                CMS Preventative Services Quick Reference  Risk Factors Identified During Encounter  None Identified    The above risks/problems have been discussed with the patient.  Pertinent information has been shared with the patient in the After Visit Summary.  An After Visit Summary and PPPS were made available to the patient.    Follow Up:   Next Medicare Wellness visit to be scheduled in 1 year.         Additional E&M Note during same encounter follows:  Patient has additional, significant, and separately identifiable condition(s)/problem(s) that require work above and beyond the Medicare Wellness Visit     Chief Complaint  Medicare Wellness-Initial Visit and Leg Swelling (Right x3wks )    Subjective  "  HPI                  Objective   Vital Signs:  /70 (BP Location: Left arm, Patient Position: Sitting, Cuff Size: Adult)   Pulse 59   Temp 98.4 °F (36.9 °C) (Temporal)   Ht 167.6 cm (66\")   Wt 109 kg (240 lb 12.8 oz)   SpO2 97%   BMI 38.87 kg/m²   Physical Exam  Constitutional:       Appearance: Normal appearance.   HENT:      Nose: Nose normal.      Mouth/Throat:      Mouth: Mucous membranes are moist.   Cardiovascular:      Rate and Rhythm: Normal rate and regular rhythm.      Pulses: Normal pulses.      Heart sounds: Normal heart sounds.   Pulmonary:      Effort: Pulmonary effort is normal.      Breath sounds: Normal breath sounds.   Musculoskeletal:      Right lower le+   Skin:     General: Skin is warm and dry.      Findings: Erythema (right lower leg) present.   Neurological:      General: No focal deficit present.      Mental Status: She is alert and oriented to person, place, and time.   Psychiatric:         Mood and Affect: Mood normal.         Behavior: Behavior normal.                       Assessment and Plan            Medicare annual wellness visit, initial    Orders:    CBC & Differential; Future    Comprehensive Metabolic Panel; Future    Class 2 obesity without serious comorbidity with body mass index (BMI) of 38.0 to 38.9 in adult, unspecified obesity type  Patient's (Body mass index is 38.87 kg/m².) indicates that they are morbidly/severely obese (BMI > 40 or > 35 with obesity - related health condition) with health conditions that include hypertension and coronary heart disease . Weight is unchanged. BMI  is above average; BMI management plan is completed. We discussed portion control and increasing exercise.          Need for hepatitis C screening test    Orders:    Hepatitis C Antibody; Future    Screening for thyroid disorder    Orders:    TSH; Future    Screening for lipid disorders    Orders:    Lipid Panel; Future    Arthralgia, unspecified joint    Orders:    MINA; Future   "  CK; Future    C-reactive protein; Future    Sedimentation rate, automated; Future    Localized swelling of right lower leg    Orders:    Duplex Venous Lower Extremity - Right CAR; Future            Follow Up   Return if symptoms worsen or fail to improve.  Patient was given instructions and counseling regarding her condition or for health maintenance advice. Please see specific information pulled into the AVS if appropriate.

## 2024-11-21 LAB
ANA SER QL: POSITIVE
DSDNA AB SER-ACNC: <1 IU/ML (ref 0–9)
Lab: NORMAL

## 2024-12-04 ENCOUNTER — HOSPITAL ENCOUNTER (OUTPATIENT)
Dept: NUCLEAR MEDICINE | Facility: HOSPITAL | Age: 69
Discharge: HOME | End: 2024-12-04
Payer: MEDICARE

## 2024-12-04 DIAGNOSIS — R06.09 DYSPNEA ON EXERTION: ICD-10-CM

## 2024-12-04 PROCEDURE — 78452 HT MUSCLE IMAGE SPECT MULT: CPT

## 2024-12-04 PROCEDURE — 25010000002 REGADENOSON 0.4 MG/5ML SOLUTION: Performed by: NURSE PRACTITIONER

## 2024-12-04 PROCEDURE — A9500 TC99M SESTAMIBI: HCPCS | Performed by: NURSE PRACTITIONER

## 2024-12-04 PROCEDURE — 34310000005 TECHNETIUM SESTAMIBI: Performed by: NURSE PRACTITIONER

## 2024-12-04 PROCEDURE — 93017 CV STRESS TEST TRACING ONLY: CPT

## 2024-12-04 RX ORDER — REGADENOSON 0.08 MG/ML
0.4 INJECTION, SOLUTION INTRAVENOUS
Status: COMPLETED | OUTPATIENT
Start: 2024-12-04 | End: 2024-12-04

## 2024-12-04 RX ADMIN — TECHNETIUM TC 99M SESTAMIBI 1 DOSE: 1 INJECTION INTRAVENOUS at 08:50

## 2024-12-04 RX ADMIN — REGADENOSON 0.4 MG: 0.08 INJECTION, SOLUTION INTRAVENOUS at 08:50

## 2024-12-04 RX ADMIN — TECHNETIUM TC 99M SESTAMIBI 1 DOSE: 1 INJECTION INTRAVENOUS at 07:12

## 2024-12-05 ENCOUNTER — TELEPHONE (OUTPATIENT)
Dept: CARDIOLOGY | Facility: CLINIC | Age: 69
End: 2024-12-05
Payer: MEDICARE

## 2024-12-05 LAB
BH CV IMMEDIATE POST TECH DATA BLOOD PRESSURE: NORMAL MMHG
BH CV IMMEDIATE POST TECH DATA HEART RATE: 79 BPM
BH CV IMMEDIATE POST TECH DATA OXYGEN SATS: 100 %
BH CV REST NUCLEAR ISOTOPE DOSE: 9.5 MCI
BH CV SIX MINUTE RECOVERY TECH DATA BLOOD PRESSURE: NORMAL
BH CV SIX MINUTE RECOVERY TECH DATA HEART RATE: 67 BPM
BH CV SIX MINUTE RECOVERY TECH DATA OXYGEN SATURATION: 99 %
BH CV STRESS BP STAGE 1: NORMAL
BH CV STRESS COMMENTS STAGE 1: NORMAL
BH CV STRESS DOSE REGADENOSON STAGE 1: 0.4
BH CV STRESS DURATION MIN STAGE 1: 0
BH CV STRESS DURATION SEC STAGE 1: 10
BH CV STRESS HR STAGE 1: 56
BH CV STRESS NUCLEAR ISOTOPE DOSE: 35.8 MCI
BH CV STRESS O2 STAGE 1: 100
BH CV STRESS PROTOCOL 1: NORMAL
BH CV STRESS RECOVERY BP: NORMAL MMHG
BH CV STRESS RECOVERY HR: 67 BPM
BH CV STRESS RECOVERY O2: 99 %
BH CV STRESS STAGE 1: 1
BH CV THREE MINUTE POST TECH DATA BLOOD PRESSURE: NORMAL MMHG
BH CV THREE MINUTE POST TECH DATA HEART RATE: 74 BPM
BH CV THREE MINUTE POST TECH DATA OXYGEN SATURATION: 99 %
LV EF NUC BP: 63 %
MAXIMAL PREDICTED HEART RATE: 150 BPM
PERCENT MAX PREDICTED HR: 53.33 %
STRESS BASELINE BP: NORMAL MMHG
STRESS BASELINE HR: 59 BPM
STRESS O2 SAT REST: 93 %
STRESS PERCENT HR: 63 %
STRESS POST O2 SAT PEAK: 100 %
STRESS POST PEAK BP: NORMAL MMHG
STRESS POST PEAK HR: 80 BPM
STRESS TARGET HR: 128 BPM

## 2024-12-05 RX ORDER — RANOLAZINE 500 MG/1
500 TABLET, EXTENDED RELEASE ORAL 2 TIMES DAILY
Qty: 180 TABLET | OUTPATIENT
Start: 2024-12-05

## 2024-12-05 RX ORDER — RANOLAZINE 500 MG/1
500 TABLET, EXTENDED RELEASE ORAL 2 TIMES DAILY
Qty: 60 TABLET | Refills: 1 | Status: SHIPPED | OUTPATIENT
Start: 2024-12-05

## 2024-12-05 NOTE — TELEPHONE ENCOUNTER
----- Message from Kaitlin Martinez sent at 12/5/2024 10:04 AM EST -----  Stress test was abnormal, showing a possible blockage in one of her coronary arteries.    Place her on my schedule next week for follow-up and further discussion.  I would also like to start a medication called Ranexa, this is an angina treatment.  Have her continue taking a baby aspirin each day.

## 2024-12-05 NOTE — TELEPHONE ENCOUNTER
JULIANA patient regarding results and recommendations. Voiced understanding.  Appointment made 12/10.

## 2024-12-06 ENCOUNTER — OFFICE VISIT (OUTPATIENT)
Dept: FAMILY MEDICINE CLINIC | Facility: CLINIC | Age: 69
End: 2024-12-06
Payer: MEDICARE

## 2024-12-06 VITALS
RESPIRATION RATE: 18 BRPM | HEIGHT: 66 IN | OXYGEN SATURATION: 99 % | WEIGHT: 236 LBS | DIASTOLIC BLOOD PRESSURE: 43 MMHG | BODY MASS INDEX: 37.93 KG/M2 | TEMPERATURE: 97.7 F | SYSTOLIC BLOOD PRESSURE: 106 MMHG | HEART RATE: 52 BPM

## 2024-12-06 DIAGNOSIS — E55.9 VITAMIN D DEFICIENCY: Chronic | ICD-10-CM

## 2024-12-06 DIAGNOSIS — E66.09 CLASS 2 OBESITY DUE TO EXCESS CALORIES WITHOUT SERIOUS COMORBIDITY WITH BODY MASS INDEX (BMI) OF 38.0 TO 38.9 IN ADULT: Chronic | ICD-10-CM

## 2024-12-06 DIAGNOSIS — F33.1 MODERATE EPISODE OF RECURRENT MAJOR DEPRESSIVE DISORDER: Chronic | ICD-10-CM

## 2024-12-06 DIAGNOSIS — E78.2 MIXED HYPERLIPIDEMIA: Chronic | ICD-10-CM

## 2024-12-06 DIAGNOSIS — E66.812 CLASS 2 OBESITY DUE TO EXCESS CALORIES WITHOUT SERIOUS COMORBIDITY WITH BODY MASS INDEX (BMI) OF 38.0 TO 38.9 IN ADULT: Chronic | ICD-10-CM

## 2024-12-06 DIAGNOSIS — I10 PRIMARY HYPERTENSION: Chronic | ICD-10-CM

## 2024-12-06 DIAGNOSIS — R25.2 MUSCLE CRAMPS: Primary | ICD-10-CM

## 2024-12-06 PROBLEM — I31.39 PERICARDIAL EFFUSION: Status: RESOLVED | Noted: 2024-02-26 | Resolved: 2024-12-06

## 2024-12-06 RX ORDER — MAGNESIUM OXIDE 400 MG/1
400 TABLET ORAL DAILY
Qty: 30 TABLET | Refills: 5 | Status: SHIPPED | OUTPATIENT
Start: 2024-12-06

## 2024-12-06 NOTE — PROGRESS NOTES
"Chief Complaint  Shortness of Breath (Chronic. Reports having a stress test performed Wednesday, f/u with cardiology this coming Tues. d/t possible blockage) and Annual Exam    Subjective        Elysia Babcock presents to [facility]  History of Present Illness  She is here today for a follow-up for a recent diagnosis of pneumonia.  She is  and lives with her son and daughter-in-law.  She spent quite a bit of her adult life in [place].  She was a  and she is now retired.  She has obesity, coronary artery disease status post stent x1 in 2017, major depression, hypertension, hyperlipidemia and vitamin D deficiency. Her dad had cad.      She is having nocturnal leg cramps at today's visit.      The patient has no other complaints today and denies chest pain, weakness, numbness, nausea, vomiting, diarrhea, dizziness or syncopal event.    Shortness of Breath  This is a chronic problem. The current episode started more than 1 month ago. The problem occurs 2 to 4 times per day. The problem has been unchanged. Associated symptoms include chest pain, leg pain, leg swelling, orthopnea, PND and sputum production. Pertinent negatives include no fever, headaches, hemoptysis, sore throat, swollen glands, syncope, vomiting or wheezing. The symptoms are aggravated by any activity. There is no history of asthma or COPD. There has been no fever.       Objective   Vital Signs:  /43 (BP Location: Left arm, Patient Position: Sitting, Cuff Size: Adult)   Pulse 52   Temp 97.7 °F (36.5 °C) (Temporal)   Resp 18   Ht 167.6 cm (65.98\")   Wt 107 kg (236 lb)   SpO2 99%   BMI 38.11 kg/m²   Estimated body mass index is 38.11 kg/m² as calculated from the following:    Height as of this encounter: 167.6 cm (65.98\").    Weight as of this encounter: 107 kg (236 lb).            Physical Exam  Vitals reviewed.   Constitutional:       Appearance: She is well-developed. She is morbidly obese. "   HENT:      Head: Normocephalic and atraumatic.      Right Ear: External ear normal.      Left Ear: External ear normal.      Mouth/Throat:      Pharynx: No oropharyngeal exudate.   Eyes:      Conjunctiva/sclera: Conjunctivae normal.      Pupils: Pupils are equal, round, and reactive to light.   Neck:      Vascular: No carotid bruit.   Cardiovascular:      Rate and Rhythm: Normal rate and regular rhythm.      Heart sounds: No murmur heard.     No friction rub. No gallop.   Pulmonary:      Effort: Pulmonary effort is normal.      Breath sounds: Normal breath sounds. No wheezing or rhonchi.   Abdominal:      General: There is no distension.   Skin:     General: Skin is warm and dry.   Neurological:      Mental Status: She is alert and oriented to person, place, and time.      Cranial Nerves: No cranial nerve deficit.      Motor: No weakness.   Psychiatric:         Mood and Affect: Mood and affect normal.         Behavior: Behavior normal.         Thought Content: Thought content normal.         Judgment: Judgment normal.        Result Review :    CMP          4/19/2024    09:02 5/17/2024    10:53 11/20/2024    14:57   CMP   Glucose  117  98    BUN  17  29    Creatinine 0.90  0.83  0.97    EGFR 69.3  76.4  63.0    Sodium  137  136    Potassium  4.0  4.3    Chloride  103  102    Calcium  8.9  9.7    Total Protein  6.7  7.0    Albumin  3.9  4.1    Globulin  2.8  2.9    Total Bilirubin  1.0  0.6    Alkaline Phosphatase  60  65    AST (SGOT)  17  19    ALT (SGPT)  13  17    Albumin/Globulin Ratio  1.4  1.4    BUN/Creatinine Ratio  20.5  29.9    Anion Gap  11.8  9.4      CBC          1/25/2024    11:12 5/17/2024    10:53 11/20/2024    14:57   CBC   WBC 5.86  6.57  6.31    RBC 3.82  3.62  4.08    Hemoglobin 12.7  12.1  13.4    Hematocrit 38.6  37.4  40.6    .0  103.3  99.5    MCH 33.2  33.4  32.8    MCHC 32.9  32.4  33.0    RDW 14.8  15.8  13.6    Platelets 161  181  163      Lipid Panel          11/20/2024    14:57    Lipid Panel   Total Cholesterol 256    Triglycerides 106    HDL Cholesterol 58    VLDL Cholesterol 19    LDL Cholesterol  179    LDL/HDL Ratio 3.05      TSH          11/20/2024    14:57   TSH   TSH 2.480                Assessment and Plan   Diagnoses and all orders for this visit:    1. Muscle cramps (Primary)  -     magnesium oxide (MAG-OX) 400 MG tablet; Take 1 tablet by mouth Daily.  Dispense: 30 tablet; Refill: 5    2. Class 2 obesity due to excess calories without serious comorbidity with body mass index (BMI) of 38.0 to 38.9 in adult  Assessment & Plan:  Patient's (Body mass index is 38.11 kg/m².) indicates that they are morbidly/severely obese (BMI > 40 or > 35 with obesity - related health condition) with health conditions that include hypertension and dyslipidemias . Weight is improving with lifestyle modifications. BMI  is above average; BMI management plan is completed. We discussed low calorie, low carb based diet program, portion control, and increasing exercise.       3. Primary hypertension  Assessment & Plan:  Hypertension is stable and controlled  Continue current treatment regimen.  Dietary sodium restriction.  Weight loss.  Blood pressure will be reassessed in 6 months.      4. Mixed hyperlipidemia  Assessment & Plan:  Lipid abnormalities are improving with lifestyle modifications.  Nutritional counseling was provided.  Lipids will be reassessed in 6 months.    The 10-year ASCVD risk score (Reagan DK, et al., 2019) is: 9.4%    Values used to calculate the score:      Age: 69 years      Sex: Female      Is Non- : No      Diabetic: No      Tobacco smoker: No      Systolic Blood Pressure: 106 mmHg      Is BP treated: Yes      HDL Cholesterol: 58 mg/dL      Total Cholesterol: 256 mg/dL        5. Moderate episode of recurrent major depressive disorder  Assessment & Plan:  Patient's depression is recurrent and is moderate without psychosis. Their depression is currently active  and the condition is improving with treatment. This will be reassessed at the next regular appointment. F/U as described:patient will continue current medication therapy.      6. Vitamin D deficiency  -     Vitamin D 25 hydroxy; Future             Follow Up   Return in about 4 months (around 4/6/2025).  Patient was given instructions and counseling regarding her condition or for health maintenance advice. Please see specific information pulled into the AVS if appropriate.             Answers submitted by the patient for this visit:  Primary Reason for Visit (Submitted on 12/5/2024)  What is the primary reason for your visit?: Shortness of Breath  Shortness of Breath Questionnaire (Submitted on 12/5/2024)  Chief Complaint: Shortness of breath  chest congestion: No  dry cough: No  nasal congestion: No

## 2024-12-06 NOTE — ASSESSMENT & PLAN NOTE
Lipid abnormalities are improving with lifestyle modifications.  Nutritional counseling was provided.  Lipids will be reassessed in 6 months.    The 10-year ASCVD risk score (Reagan BHATTI, et al., 2019) is: 9.4%    Values used to calculate the score:      Age: 69 years      Sex: Female      Is Non- : No      Diabetic: No      Tobacco smoker: No      Systolic Blood Pressure: 106 mmHg      Is BP treated: Yes      HDL Cholesterol: 58 mg/dL      Total Cholesterol: 256 mg/dL

## 2024-12-06 NOTE — ASSESSMENT & PLAN NOTE
Patient's (Body mass index is 38.11 kg/m².) indicates that they are morbidly/severely obese (BMI > 40 or > 35 with obesity - related health condition) with health conditions that include hypertension and dyslipidemias . Weight is improving with lifestyle modifications. BMI  is above average; BMI management plan is completed. We discussed low calorie, low carb based diet program, portion control, and increasing exercise.

## 2024-12-10 ENCOUNTER — OFFICE VISIT (OUTPATIENT)
Dept: CARDIOLOGY | Facility: CLINIC | Age: 69
End: 2024-12-10
Payer: MEDICARE

## 2024-12-10 VITALS
HEART RATE: 79 BPM | SYSTOLIC BLOOD PRESSURE: 148 MMHG | DIASTOLIC BLOOD PRESSURE: 61 MMHG | WEIGHT: 238 LBS | HEIGHT: 66 IN | BODY MASS INDEX: 38.25 KG/M2

## 2024-12-10 DIAGNOSIS — I25.10 CORONARY ARTERY DISEASE INVOLVING NATIVE CORONARY ARTERY OF NATIVE HEART WITHOUT ANGINA PECTORIS: Primary | ICD-10-CM

## 2024-12-10 DIAGNOSIS — E78.2 MIXED HYPERLIPIDEMIA: ICD-10-CM

## 2024-12-10 DIAGNOSIS — I10 HYPERTENSION, ESSENTIAL: ICD-10-CM

## 2024-12-10 DIAGNOSIS — I42.2 HYPERTROPHIC CARDIOMYOPATHY: ICD-10-CM

## 2024-12-10 NOTE — PROGRESS NOTES
Chief Complaint  No chief complaint on file.    Subjective            Elysia Babcock presents to [facility]  History of Present Illness    Ms. Babcock is here today for follow-up evaluation and management of hypertrophic nonobstructive cardiomyopathy, essential hypertension, lower extremity edema.  At last office visit, she reported more shortness of breath than usual with some chest pressure.  Subsequent stress imaging was abnormal showing a moderately sized, moderately severe area of ischemia located in the apex.  EF normal.  She continues to have the same symptoms as above.  She has not yet picked up Ranexa.    PMH  Past Medical History:   Diagnosis Date    Allergic     Arthritis     Asthma     CHF (congestive heart failure)     Clotting disorder     Congenital heart disease     Coronary artery disease     Depression     Heart murmur     Hyperlipidemia     Hypertension     Obesity     Pleural effusion     Pneumonia 2023    Pulmonary arterial hypertension 2024    Rheumatoid arthritis     Visual impairment          SURGICALHX  Past Surgical History:   Procedure Laterality Date    CATARACT EXTRACTION Bilateral      SECTION      CHOLECYSTECTOMY      COLONOSCOPY      COLONOSCOPY N/A 10/01/2024    Procedure: COLONOSCOPY;  Surgeon: Michael Mcknight MD;  Location: [facility] ENDOSCOPY;  Service: General;  Laterality: N/A;  diverticulosis    CORONARY STENT PLACEMENT      HIP BIPOLAR REPLACEMENT      JOINT REPLACEMENT      TONSILLECTOMY          SOC  Social History     Socioeconomic History    Marital status:    Tobacco Use    Smoking status: Never     Passive exposure: Never    Smokeless tobacco: Never    Tobacco comments:     No second hand smoke exposure    Vaping Use    Vaping status: Never Used   Substance and Sexual Activity    Alcohol use: Yes     Alcohol/week: 6.0 standard drinks of alcohol     Types: 3 Glasses of wine, 3 Drinks containing 0.5 oz  "of alcohol per week     Comment: I enjoy happy hour.    Drug use: Never    Sexual activity: Not Currently     Partners: Male     Birth control/protection: None     Comment: I have been off the pill for 25 years.         FAMHX  Family History   Problem Relation Age of Onset    Asthma Mother     Depression Mother     Hyperlipidemia Mother     Heart disease Father     Heart failure Father           ALLERGY  No Known Allergies     MEDSCURRENT    Current Outpatient Medications:     acetaminophen (TYLENOL) 500 MG tablet, Take 1 tablet by mouth Every 6 (Six) Hours As Needed for Mild Pain., Disp: , Rfl:     albuterol sulfate  (90 Base) MCG/ACT inhaler, Inhale 2 puffs Every 4 (Four) Hours As Needed for Wheezing., Disp: 18 g, Rfl: 0    alendronate (FOSAMAX) 70 MG tablet, TAKE 1 TABLET BY MOUTH EVERY 7 DAYS, Disp: 12 tablet, Rfl: 0    aspirin 81 MG EC tablet, Take 1 tablet by mouth Daily., Disp: , Rfl:     FLUoxetine (PROzac) 40 MG capsule, TAKE 1 CAPSULE BY MOUTH DAILY, Disp: 30 capsule, Rfl: 3    furosemide (LASIX) 40 MG tablet, TAKE 1 TABLET BY MOUTH DAILY, Disp: 30 tablet, Rfl: 3    magnesium oxide (MAG-OX) 400 MG tablet, Take 1 tablet by mouth Daily., Disp: 30 tablet, Rfl: 5    metoprolol tartrate (LOPRESSOR) 50 MG tablet, Take 1 tablet by mouth 2 (Two) Times a Day., Disp: 180 tablet, Rfl: 3    prednisoLONE acetate (PRED FORTE) 1 % ophthalmic suspension, , Disp: , Rfl:     ranolazine (Ranexa) 500 MG 12 hr tablet, Take 1 tablet by mouth 2 (Two) Times a Day., Disp: 60 tablet, Rfl: 1    spironolactone (ALDACTONE) 25 MG tablet, Take 1 tablet by mouth Daily., Disp: 90 tablet, Rfl: 3    vitamin D3 125 MCG (5000 UT) capsule capsule, Take 1 capsule by mouth Daily., Disp: , Rfl:       Review of Systems   Cardiovascular:  Positive for chest pain and dyspnea on exertion.        Objective     /61   Pulse 79   Ht 167.6 cm (65.98\")   Wt 108 kg (238 lb)   BMI 38.44 kg/m²       General Appearance:   well developed  well " nourished  HENT:   oropharynx moist  lips not cyanotic  Neck:  thyroid not enlarged  supple  Respiratory:  no respiratory distress  normal breath sounds  no rales  Cardiovascular:  no jugular venous distention  regular rhythm  apical impulse normal  S1 normal, S2 normal  no S3, no S4   Systolic murmur  no rub, no thrill  carotid pulses normal; no bruit  pedal pulses normal  lower extremity edema: none    Musculoskeletal:  no clubbing of fingers.   normocephalic, head atraumatic  Skin:   warm, dry  Psychiatric:  judgement and insight appropriate  normal mood and affect      Result Review :     The following data was reviewed by: JERSON Urias on 12/10/2024:    CMP          4/19/2024    09:02 5/17/2024    10:53 11/20/2024    14:57   CMP   Glucose  117  98    BUN  17  29    Creatinine 0.90  0.83  0.97    EGFR 69.3  76.4  63.0    Sodium  137  136    Potassium  4.0  4.3    Chloride  103  102    Calcium  8.9  9.7    Total Protein  6.7  7.0    Albumin  3.9  4.1    Globulin  2.8  2.9    Total Bilirubin  1.0  0.6    Alkaline Phosphatase  60  65    AST (SGOT)  17  19    ALT (SGPT)  13  17    Albumin/Globulin Ratio  1.4  1.4    BUN/Creatinine Ratio  20.5  29.9    Anion Gap  11.8  9.4      CBC          1/25/2024    11:12 5/17/2024    10:53 11/20/2024    14:57   CBC   WBC 5.86  6.57  6.31    RBC 3.82  3.62  4.08    Hemoglobin 12.7  12.1  13.4    Hematocrit 38.6  37.4  40.6    .0  103.3  99.5    MCH 33.2  33.4  32.8    MCHC 32.9  32.4  33.0    RDW 14.8  15.8  13.6    Platelets 161  181  163      Lipid Panel          11/20/2024    14:57   Lipid Panel   Total Cholesterol 256    Triglycerides 106    HDL Cholesterol 58    VLDL Cholesterol 19    LDL Cholesterol  179    LDL/HDL Ratio 3.05      TSH          11/20/2024    14:57   TSH   TSH 2.480        Data reviewed : Cardiology studies stress test reviewed above      Procedures      Elysia Winimes  reports that she has never smoked. She has never been exposed to  tobacco smoke. She has never used smokeless tobacco. I have educated her on the risk of diseases from using tobacco products such as cancer, COPD, and heart disease.            Assessment and Plan        ASSESSMENT:  Encounter Diagnoses   Name Primary?    Coronary artery disease involving native coronary artery of native heart without angina pectoris Yes    Hypertension, essential     Mixed hyperlipidemia     Hypertrophic cardiomyopathy          PLAN:    1.  Coronary artery disease with previous PCI to the LAD in 2015.  She has had progressive shortness of breath on exertion, subsequent stress test was abnormal showing possible ischemia in the apex.  I have advised her to go ahead and  Ranexa and start that today.  Continue beta-blocker, aspirin.  Will see her back for close follow-up in a couple of weeks, if symptoms progress despite antianginal therapy we will likely arrange for coronary angiography.  She is agreeable to this plan.  2.  Essential hypertension, little elevated today.  Typically controlled.  Continue current medical therapy.  3.  Mixed hyperlipidemia, LDL is above goal.  I do not see that she is currently on any lipid-lowering therapy.  Will address at follow-up.  4.  Hypertrophic nonobstructive cardiomyopathy, volume status is stable today.  Continue Lasix and Aldactone.  I did start Entresto at last visit however this was cost prohibitive.  Will hold on resuming that today.  If needed in the future can provide samples and enroll her in cost assistance.    Follow-up arranged.      Patient was given instructions and counseling regarding her condition or for health maintenance advice. Please see specific information pulled into the AVS if appropriate.           Kaitlin Martinez, APRN   12/10/2024  12:06 EST

## 2025-01-28 RX ORDER — FUROSEMIDE 40 MG/1
40 TABLET ORAL DAILY
Qty: 30 TABLET | Refills: 2 | Status: SHIPPED | OUTPATIENT
Start: 2025-01-28

## 2025-01-29 RX ORDER — FUROSEMIDE 40 MG/1
40 TABLET ORAL DAILY
Qty: 30 TABLET | Refills: 2 | OUTPATIENT
Start: 2025-01-29

## 2025-02-13 DIAGNOSIS — M85.89 OSTEOPENIA OF MULTIPLE SITES: ICD-10-CM

## 2025-02-14 RX ORDER — ALENDRONATE SODIUM 70 MG/1
70 TABLET ORAL
Qty: 12 TABLET | Refills: 0 | Status: SHIPPED | OUTPATIENT
Start: 2025-02-14

## 2025-02-18 ENCOUNTER — OFFICE VISIT (OUTPATIENT)
Dept: CARDIOLOGY | Facility: CLINIC | Age: 70
End: 2025-02-18
Payer: MEDICARE

## 2025-02-18 VITALS
DIASTOLIC BLOOD PRESSURE: 75 MMHG | HEART RATE: 86 BPM | WEIGHT: 233 LBS | HEIGHT: 66 IN | BODY MASS INDEX: 37.45 KG/M2 | SYSTOLIC BLOOD PRESSURE: 128 MMHG

## 2025-02-18 DIAGNOSIS — R06.09 DYSPNEA ON EXERTION: ICD-10-CM

## 2025-02-18 DIAGNOSIS — I25.10 CORONARY ARTERY DISEASE INVOLVING NATIVE CORONARY ARTERY OF NATIVE HEART WITHOUT ANGINA PECTORIS: Primary | ICD-10-CM

## 2025-02-18 DIAGNOSIS — E78.2 MIXED HYPERLIPIDEMIA: ICD-10-CM

## 2025-02-18 DIAGNOSIS — I42.2 HYPERTROPHIC CARDIOMYOPATHY: ICD-10-CM

## 2025-02-18 DIAGNOSIS — I10 PRIMARY HYPERTENSION: Chronic | ICD-10-CM

## 2025-02-18 RX ORDER — ATORVASTATIN CALCIUM 20 MG/1
20 TABLET, FILM COATED ORAL DAILY
Qty: 90 TABLET | Refills: 3 | Status: SHIPPED | OUTPATIENT
Start: 2025-02-18

## 2025-02-18 RX ORDER — SODIUM CHLORIDE 0.9 % (FLUSH) 0.9 %
10 SYRINGE (ML) INJECTION EVERY 12 HOURS SCHEDULED
OUTPATIENT
Start: 2025-02-18

## 2025-02-18 RX ORDER — RANOLAZINE 500 MG/1
500 TABLET, EXTENDED RELEASE ORAL 2 TIMES DAILY
Qty: 180 TABLET | Refills: 3 | Status: SHIPPED | OUTPATIENT
Start: 2025-02-18

## 2025-02-18 RX ORDER — METOPROLOL TARTRATE 50 MG
50 TABLET ORAL 2 TIMES DAILY
Qty: 180 TABLET | Refills: 3 | Status: SHIPPED | OUTPATIENT
Start: 2025-02-18

## 2025-02-18 RX ORDER — SODIUM CHLORIDE 0.9 % (FLUSH) 0.9 %
10 SYRINGE (ML) INJECTION AS NEEDED
OUTPATIENT
Start: 2025-02-18

## 2025-02-18 RX ORDER — SODIUM CHLORIDE 9 MG/ML
40 INJECTION, SOLUTION INTRAVENOUS AS NEEDED
OUTPATIENT
Start: 2025-02-18

## 2025-02-18 NOTE — ASSESSMENT & PLAN NOTE
Symptoms showed no improvement with antianginal therapy.  Previous stress test is abnormal.  Recommend left heart catheterization for further evaluation and management.  This will be scheduled as discussed above.  Further recommendations pending results.

## 2025-02-18 NOTE — PROGRESS NOTES
Chief Complaint  Follow-up    Subjective        History of Present Illness  Elysia Babcock presents to [facility] CARDIOLOGY for follow patient is a 69-year-old female with past medical history outlined below, significant for hypertrophic nonobstructive cardiomyopathy, hypertension, hyperlipidemia, abnormal stress testing, coronary artery disease who presents for follow-up.  She was recently trialed on antianginal therapy.  She reports no improvement in her breathing or chest discomfort.  She reports she cannot walk hardly at all without getting significantly short of breath.  This has been ongoing for several months.  She does have some associated chest discomfort.  She has a recent stress test that was abnormal showing a moderately sized, moderately severe area of ischemia located in the apex.  She denies any edema, palpitations, dizziness or lightheadedness.    Past Medical History:   Diagnosis Date    Allergic     Arthritis     Asthma     CHF (congestive heart failure)     Clotting disorder     Congenital heart disease     Coronary artery disease     Depression     Heart murmur     Hyperlipidemia     Hypertension     Obesity     Pleural effusion     Pneumonia 2023    Pulmonary arterial hypertension 2024    Rheumatoid arthritis     Visual impairment        ALLERGY  No Known Allergies     Past Surgical History:   Procedure Laterality Date    CATARACT EXTRACTION Bilateral      SECTION      CHOLECYSTECTOMY      COLONOSCOPY      COLONOSCOPY N/A 10/01/2024    Procedure: COLONOSCOPY;  Surgeon: Michael Mcknight MD;  Location: [facility] ENDOSCOPY;  Service: General;  Laterality: N/A;  diverticulosis    CORONARY STENT PLACEMENT      HIP BIPOLAR REPLACEMENT      JOINT REPLACEMENT      TONSILLECTOMY          Social History     Socioeconomic History    Marital status:    Tobacco Use    Smoking status: Never     Passive exposure: Never    Smokeless tobacco: Never     "Tobacco comments:     No second hand smoke exposure    Vaping Use    Vaping status: Never Used   Substance and Sexual Activity    Alcohol use: Yes     Alcohol/week: 6.0 standard drinks of alcohol     Types: 3 Glasses of wine, 3 Drinks containing 0.5 oz of alcohol per week     Comment: I enjoy happy hour.    Drug use: Never    Sexual activity: Not Currently     Partners: Male     Birth control/protection: None     Comment: I have been off the pill for 25 years.       Family History   Problem Relation Age of Onset    Asthma Mother     Depression Mother     Hyperlipidemia Mother     Heart disease Father     Heart failure Father         Current Outpatient Medications on File Prior to Visit   Medication Sig    acetaminophen (TYLENOL) 500 MG tablet Take 1 tablet by mouth Every 6 (Six) Hours As Needed for Mild Pain.    albuterol sulfate  (90 Base) MCG/ACT inhaler Inhale 2 puffs Every 4 (Four) Hours As Needed for Wheezing.    alendronate (FOSAMAX) 70 MG tablet TAKE 1 TABLET BY MOUTH EVERY 7 DAYS    aspirin 81 MG EC tablet Take 1 tablet by mouth Daily.    FLUoxetine (PROzac) 40 MG capsule TAKE 1 CAPSULE BY MOUTH DAILY    furosemide (LASIX) 40 MG tablet TAKE 1 TABLET BY MOUTH DAILY    magnesium oxide (MAG-OX) 400 MG tablet Take 1 tablet by mouth Daily.    prednisoLONE acetate (PRED FORTE) 1 % ophthalmic suspension     spironolactone (ALDACTONE) 25 MG tablet Take 1 tablet by mouth Daily.    vitamin D3 125 MCG (5000 UT) capsule capsule Take 1 capsule by mouth Daily.    [DISCONTINUED] metoprolol tartrate (LOPRESSOR) 50 MG tablet Take 1 tablet by mouth 2 (Two) Times a Day.    [DISCONTINUED] ranolazine (Ranexa) 500 MG 12 hr tablet Take 1 tablet by mouth 2 (Two) Times a Day.     No current facility-administered medications on file prior to visit.       Objective   Vitals:    02/18/25 0939   BP: 128/75   Pulse: 86   Weight: 106 kg (233 lb)   Height: 167.6 cm (65.98\")       Physical Exam  Constitutional:       General: She is " awake. She is not in acute distress.     Appearance: Normal appearance.   HENT:      Head: Normocephalic.      Nose: Nose normal. No congestion.   Eyes:      Extraocular Movements: Extraocular movements intact.      Conjunctiva/sclera: Conjunctivae normal.      Pupils: Pupils are equal, round, and reactive to light.   Neck:      Thyroid: No thyromegaly.      Vascular: No JVD.   Cardiovascular:      Rate and Rhythm: Normal rate and regular rhythm.      Chest Wall: PMI is not displaced.      Pulses: Normal pulses.      Heart sounds: S1 normal and S2 normal. Murmur heard.      No friction rub. No gallop. No S3 or S4 sounds.   Pulmonary:      Effort: Pulmonary effort is normal.      Breath sounds: Normal breath sounds. No wheezing, rhonchi or rales.   Abdominal:      General: Bowel sounds are normal.      Palpations: Abdomen is soft.      Tenderness: There is no abdominal tenderness.   Musculoskeletal:      Cervical back: No tenderness.      Right lower leg: No edema.      Left lower leg: No edema.   Lymphadenopathy:      Cervical: No cervical adenopathy.   Skin:     General: Skin is warm and dry.      Capillary Refill: Capillary refill takes less than 2 seconds.      Coloration: Skin is not cyanotic.      Findings: No petechiae or rash.      Nails: There is no clubbing.   Neurological:      Mental Status: She is alert.   Psychiatric:         Mood and Affect: Mood normal.         Behavior: Behavior is cooperative.           Result Review     The following data was reviewed by JERSON Nguyễn on 02/18/25.      CMP          4/19/2024    09:02 5/17/2024    10:53 11/20/2024    14:57   CMP   Glucose  117  98    BUN  17  29    Creatinine 0.90  0.83  0.97    EGFR 69.3  76.4  63.0    Sodium  137  136    Potassium  4.0  4.3    Chloride  103  102    Calcium  8.9  9.7    Total Protein  6.7  7.0    Albumin  3.9  4.1    Globulin  2.8  2.9    Total Bilirubin  1.0  0.6    Alkaline Phosphatase  60  65    AST (SGOT)  17  19     ALT (SGPT)  13  17    Albumin/Globulin Ratio  1.4  1.4    BUN/Creatinine Ratio  20.5  29.9    Anion Gap  11.8  9.4      CBC w/diff          5/17/2024    10:53 11/20/2024    14:57   CBC w/Diff   WBC 6.57  6.31    RBC 3.62  4.08    Hemoglobin 12.1  13.4    Hematocrit 37.4  40.6    .3  99.5    MCH 33.4  32.8    MCHC 32.4  33.0    RDW 15.8  13.6    Platelets 181  163    Neutrophil Rel % 69.7  60.5    Immature Granulocyte Rel % 0.5  0.2    Lymphocyte Rel % 17.8  22.2    Monocyte Rel % 7.9  13.5    Eosinophil Rel % 3.3  3.0    Basophil Rel % 0.8  0.6       Lipid Panel          11/20/2024    14:57   Lipid Panel   Total Cholesterol 256    Triglycerides 106    HDL Cholesterol 58    VLDL Cholesterol 19    LDL Cholesterol  179    LDL/HDL Ratio 3.05        Results for orders placed during the hospital encounter of 02/02/24    Adult Transthoracic Echo Complete W/ Cont if Necessary Per Protocol    Interpretation Summary    Left ventricular systolic function is normal. Left ventricular ejection fraction appears to be 66 - 70%.    There is moderate asymmetric basilar septal hypertrophy the left ventricle with turbulence of flow.  The peak LVOT gradient with Valsalva is 72 mmHg.The findings are consistent with non-obstructive, hypertrophic cardiomyopathy.    There are no significant valvular abnormalities.    There is a trivial pericardial effusion, unchanged from previous echocardiogram.  There is no evidence of cardiac tamponade.    Results for orders placed during the hospital encounter of 12/04/24    Stress Test With Myocardial Perfusion One Day    Interpretation Summary    Left ventricular ejection fraction is normal (Calculated EF = 63%).    Myocardial perfusion imaging indicates a moderate-sized, moderately severe area of ischemia located in the apex.  Breast attenuation artifact was also present and attenuation corrected was used.  Cannot completely exclude that this is a breast attenuation artifact, however, it is  more prominent on stress images    Breast attenuation artifact is present.    Findings consistent with a normal ECG stress test.    Clinical correlation recommended    No results found for this or any previous visit.          Procedures      Assessment & Plan  Primary hypertension  Blood pressure is well-controlled.  Continue current regimen.  Coronary artery disease involving native coronary artery of native heart without angina pectoris  Status post previous PCI to the LAD.  Patient notes ongoing dyspnea on exertion which has been worsening over the last few months with associated chest discomfort.  She has failed antianginal therapy with Ranexa and metoprolol.  Given her ongoing symptoms and recent abnormal stress testing left heart catheterization is recommended.  Risks benefits and possible outcomes were discussed with the patient and she was agreeable to proceed.  This will be scheduled for February 27 with Dr. Gan.  Mixed hyperlipidemia  Patient is LDL is highly elevated.  Recommend statin therapy.  She will be started on atorvastatin 20 mg daily.  Hypertrophic cardiomyopathy  Volume status is stable.  Continue Lasix and spironolactone.  Entresto was cost prohibitive.  Continue beta-blocker.  This may be contributing to her dyspnea on exertion.  Dyspnea on exertion  Symptoms showed no improvement with antianginal therapy.  Previous stress test is abnormal.  Recommend left heart catheterization for further evaluation and management.  This will be scheduled as discussed above.  Further recommendations pending results.    Follow Up   Return in about 6 weeks (around 4/1/2025) for With JERSON Barrera , Dr. Gan.    Patient was given instructions and counseling regarding her condition or for health maintenance advice. Please see specific information pulled into the AVS if appropriate.     JERSON Nguyễn  02/18/25  09:56 EST    Dictated Utilizing Dragon Dictation

## 2025-02-18 NOTE — ASSESSMENT & PLAN NOTE
Patient is LDL is highly elevated.  Recommend statin therapy.  She will be started on atorvastatin 20 mg daily.

## 2025-02-18 NOTE — H&P (VIEW-ONLY)
Chief Complaint  Follow-up    Subjective        History of Present Illness  Elysia Babcock presents to [facility] CARDIOLOGY for follow patient is a 69-year-old female with past medical history outlined below, significant for hypertrophic nonobstructive cardiomyopathy, hypertension, hyperlipidemia, abnormal stress testing, coronary artery disease who presents for follow-up.  She was recently trialed on antianginal therapy.  She reports no improvement in her breathing or chest discomfort.  She reports she cannot walk hardly at all without getting significantly short of breath.  This has been ongoing for several months.  She does have some associated chest discomfort.  She has a recent stress test that was abnormal showing a moderately sized, moderately severe area of ischemia located in the apex.  She denies any edema, palpitations, dizziness or lightheadedness.    Past Medical History:   Diagnosis Date    Allergic     Arthritis     Asthma     CHF (congestive heart failure)     Clotting disorder     Congenital heart disease     Coronary artery disease     Depression     Heart murmur     Hyperlipidemia     Hypertension     Obesity     Pleural effusion     Pneumonia 2023    Pulmonary arterial hypertension 2024    Rheumatoid arthritis     Visual impairment        ALLERGY  No Known Allergies     Past Surgical History:   Procedure Laterality Date    CATARACT EXTRACTION Bilateral      SECTION      CHOLECYSTECTOMY      COLONOSCOPY      COLONOSCOPY N/A 10/01/2024    Procedure: COLONOSCOPY;  Surgeon: Michael Mcknight MD;  Location: [facility] ENDOSCOPY;  Service: General;  Laterality: N/A;  diverticulosis    CORONARY STENT PLACEMENT      HIP BIPOLAR REPLACEMENT      JOINT REPLACEMENT      TONSILLECTOMY          Social History     Socioeconomic History    Marital status:    Tobacco Use    Smoking status: Never     Passive exposure: Never    Smokeless tobacco: Never     "Tobacco comments:     No second hand smoke exposure    Vaping Use    Vaping status: Never Used   Substance and Sexual Activity    Alcohol use: Yes     Alcohol/week: 6.0 standard drinks of alcohol     Types: 3 Glasses of wine, 3 Drinks containing 0.5 oz of alcohol per week     Comment: I enjoy happy hour.    Drug use: Never    Sexual activity: Not Currently     Partners: Male     Birth control/protection: None     Comment: I have been off the pill for 25 years.       Family History   Problem Relation Age of Onset    Asthma Mother     Depression Mother     Hyperlipidemia Mother     Heart disease Father     Heart failure Father         Current Outpatient Medications on File Prior to Visit   Medication Sig    acetaminophen (TYLENOL) 500 MG tablet Take 1 tablet by mouth Every 6 (Six) Hours As Needed for Mild Pain.    albuterol sulfate  (90 Base) MCG/ACT inhaler Inhale 2 puffs Every 4 (Four) Hours As Needed for Wheezing.    alendronate (FOSAMAX) 70 MG tablet TAKE 1 TABLET BY MOUTH EVERY 7 DAYS    aspirin 81 MG EC tablet Take 1 tablet by mouth Daily.    FLUoxetine (PROzac) 40 MG capsule TAKE 1 CAPSULE BY MOUTH DAILY    furosemide (LASIX) 40 MG tablet TAKE 1 TABLET BY MOUTH DAILY    magnesium oxide (MAG-OX) 400 MG tablet Take 1 tablet by mouth Daily.    prednisoLONE acetate (PRED FORTE) 1 % ophthalmic suspension     spironolactone (ALDACTONE) 25 MG tablet Take 1 tablet by mouth Daily.    vitamin D3 125 MCG (5000 UT) capsule capsule Take 1 capsule by mouth Daily.    [DISCONTINUED] metoprolol tartrate (LOPRESSOR) 50 MG tablet Take 1 tablet by mouth 2 (Two) Times a Day.    [DISCONTINUED] ranolazine (Ranexa) 500 MG 12 hr tablet Take 1 tablet by mouth 2 (Two) Times a Day.     No current facility-administered medications on file prior to visit.       Objective   Vitals:    02/18/25 0939   BP: 128/75   Pulse: 86   Weight: 106 kg (233 lb)   Height: 167.6 cm (65.98\")       Physical Exam  Constitutional:       General: She is " awake. She is not in acute distress.     Appearance: Normal appearance.   HENT:      Head: Normocephalic.      Nose: Nose normal. No congestion.   Eyes:      Extraocular Movements: Extraocular movements intact.      Conjunctiva/sclera: Conjunctivae normal.      Pupils: Pupils are equal, round, and reactive to light.   Neck:      Thyroid: No thyromegaly.      Vascular: No JVD.   Cardiovascular:      Rate and Rhythm: Normal rate and regular rhythm.      Chest Wall: PMI is not displaced.      Pulses: Normal pulses.      Heart sounds: S1 normal and S2 normal. Murmur heard.      No friction rub. No gallop. No S3 or S4 sounds.   Pulmonary:      Effort: Pulmonary effort is normal.      Breath sounds: Normal breath sounds. No wheezing, rhonchi or rales.   Abdominal:      General: Bowel sounds are normal.      Palpations: Abdomen is soft.      Tenderness: There is no abdominal tenderness.   Musculoskeletal:      Cervical back: No tenderness.      Right lower leg: No edema.      Left lower leg: No edema.   Lymphadenopathy:      Cervical: No cervical adenopathy.   Skin:     General: Skin is warm and dry.      Capillary Refill: Capillary refill takes less than 2 seconds.      Coloration: Skin is not cyanotic.      Findings: No petechiae or rash.      Nails: There is no clubbing.   Neurological:      Mental Status: She is alert.   Psychiatric:         Mood and Affect: Mood normal.         Behavior: Behavior is cooperative.           Result Review     The following data was reviewed by JERSON Nugyễn on 02/18/25.      CMP          4/19/2024    09:02 5/17/2024    10:53 11/20/2024    14:57   CMP   Glucose  117  98    BUN  17  29    Creatinine 0.90  0.83  0.97    EGFR 69.3  76.4  63.0    Sodium  137  136    Potassium  4.0  4.3    Chloride  103  102    Calcium  8.9  9.7    Total Protein  6.7  7.0    Albumin  3.9  4.1    Globulin  2.8  2.9    Total Bilirubin  1.0  0.6    Alkaline Phosphatase  60  65    AST (SGOT)  17  19     ALT (SGPT)  13  17    Albumin/Globulin Ratio  1.4  1.4    BUN/Creatinine Ratio  20.5  29.9    Anion Gap  11.8  9.4      CBC w/diff          5/17/2024    10:53 11/20/2024    14:57   CBC w/Diff   WBC 6.57  6.31    RBC 3.62  4.08    Hemoglobin 12.1  13.4    Hematocrit 37.4  40.6    .3  99.5    MCH 33.4  32.8    MCHC 32.4  33.0    RDW 15.8  13.6    Platelets 181  163    Neutrophil Rel % 69.7  60.5    Immature Granulocyte Rel % 0.5  0.2    Lymphocyte Rel % 17.8  22.2    Monocyte Rel % 7.9  13.5    Eosinophil Rel % 3.3  3.0    Basophil Rel % 0.8  0.6       Lipid Panel          11/20/2024    14:57   Lipid Panel   Total Cholesterol 256    Triglycerides 106    HDL Cholesterol 58    VLDL Cholesterol 19    LDL Cholesterol  179    LDL/HDL Ratio 3.05        Results for orders placed during the hospital encounter of 02/02/24    Adult Transthoracic Echo Complete W/ Cont if Necessary Per Protocol    Interpretation Summary    Left ventricular systolic function is normal. Left ventricular ejection fraction appears to be 66 - 70%.    There is moderate asymmetric basilar septal hypertrophy the left ventricle with turbulence of flow.  The peak LVOT gradient with Valsalva is 72 mmHg.The findings are consistent with non-obstructive, hypertrophic cardiomyopathy.    There are no significant valvular abnormalities.    There is a trivial pericardial effusion, unchanged from previous echocardiogram.  There is no evidence of cardiac tamponade.    Results for orders placed during the hospital encounter of 12/04/24    Stress Test With Myocardial Perfusion One Day    Interpretation Summary    Left ventricular ejection fraction is normal (Calculated EF = 63%).    Myocardial perfusion imaging indicates a moderate-sized, moderately severe area of ischemia located in the apex.  Breast attenuation artifact was also present and attenuation corrected was used.  Cannot completely exclude that this is a breast attenuation artifact, however, it is  more prominent on stress images    Breast attenuation artifact is present.    Findings consistent with a normal ECG stress test.    Clinical correlation recommended    No results found for this or any previous visit.          Procedures      Assessment & Plan  Primary hypertension  Blood pressure is well-controlled.  Continue current regimen.  Coronary artery disease involving native coronary artery of native heart without angina pectoris  Status post previous PCI to the LAD.  Patient notes ongoing dyspnea on exertion which has been worsening over the last few months with associated chest discomfort.  She has failed antianginal therapy with Ranexa and metoprolol.  Given her ongoing symptoms and recent abnormal stress testing left heart catheterization is recommended.  Risks benefits and possible outcomes were discussed with the patient and she was agreeable to proceed.  This will be scheduled for February 27 with Dr. Gan.  Mixed hyperlipidemia  Patient is LDL is highly elevated.  Recommend statin therapy.  She will be started on atorvastatin 20 mg daily.  Hypertrophic cardiomyopathy  Volume status is stable.  Continue Lasix and spironolactone.  Entresto was cost prohibitive.  Continue beta-blocker.  This may be contributing to her dyspnea on exertion.  Dyspnea on exertion  Symptoms showed no improvement with antianginal therapy.  Previous stress test is abnormal.  Recommend left heart catheterization for further evaluation and management.  This will be scheduled as discussed above.  Further recommendations pending results.    Follow Up   Return in about 6 weeks (around 4/1/2025) for With JERSON Barrera , Dr. Gan.    Patient was given instructions and counseling regarding her condition or for health maintenance advice. Please see specific information pulled into the AVS if appropriate.     JERSON Nguyễn  02/18/25  09:56 EST    Dictated Utilizing Dragon Dictation

## 2025-02-18 NOTE — ASSESSMENT & PLAN NOTE
Volume status is stable.  Continue Lasix and spironolactone.  Entresto was cost prohibitive.  Continue beta-blocker.  This may be contributing to her dyspnea on exertion.

## 2025-02-18 NOTE — ASSESSMENT & PLAN NOTE
Status post previous PCI to the LAD.  Patient notes ongoing dyspnea on exertion which has been worsening over the last few months with associated chest discomfort.  She has failed antianginal therapy with Ranexa and metoprolol.  Given her ongoing symptoms and recent abnormal stress testing left heart catheterization is recommended.  Risks benefits and possible outcomes were discussed with the patient and she was agreeable to proceed.  This will be scheduled for February 27 with Dr. Gan.

## 2025-02-21 ENCOUNTER — TELEPHONE (OUTPATIENT)
Dept: CARDIOLOGY | Facility: CLINIC | Age: 70
End: 2025-02-21
Payer: MEDICARE

## 2025-02-21 NOTE — TELEPHONE ENCOUNTER
I spoke to patient and gave an appointment on 02/27/25 for [facility]. Patient was instructed to have a  for the day of the procedure and to arrive at the main entrance registration area in the pavilion. Patient was educated about stent placement and informed that in the event of stent placement, the patient will be required to stay overnight for observation. Patient was instructed to continue all medications as usual. Patient was instructed to have no solid food or milk for 6 hours prior to scheduled arrival time, clear liquids only for 6 hours prior up to 2 hours prior to schedule arrival time, NPO for 2 hours prior to scheduled arrival time. Patient was instructed to have labs completed on the morning of the procedure. Patient is agreeable with no other questions or concerns.

## 2025-02-26 NOTE — PRE-PROCEDURE INSTRUCTIONS
PATIENT INSTRUCTED TO BE:    - NPO AFTER MIDNIGHT EXCEPT CAN HAVE SIPS OF WATER WITH HIS MEDICATION PRIOR TO PROCEDURE    -  INSTRUCTED NO LOTIONS, JEWELRY, PIERCINGS, OR DEODORANT DAY OF THE PROCEDURE    - INSTRUCTED TO TAKE THE FOLLOWING MEDICATIONS THE DAY OF SURGERY: METOPROLOL, TYLENOL IF NEEDED, ASPIRIN, RANEXA, INHALER, FLUOXETINE, ATORVASTATIN, FUROSEMIDE, SPIRONOLACTONE          - INSTRUCTED PT TO FOLLOW ANY INSTRUCTIONS GIVEN BY HIS CARDIOLOGIST.    - INFORMED PT THEY ATTEMPT RADIAL APPROACH FIRST IF UNABLE TO PERFORM CATH WITH THAT APPROACH THEY WILL DO A FEMORAL APPROACH.  ALSO, INFORMED PT THEY WILL BE ON BEDREST POSTOP.  IF THE SURGEON FEELS  AN INTERVENTION OR STENTS NEEDS TO BE PLACED, HE/SHE WILL STAY OVER NIGHT FOR OBSERVATION AND MONITORING.     - INFORMED THE PATIENT HE CAN HAVE TWO VISITORS WITH HIM THE DAY OF THE PROCEDURE    - INSTRUCTED PT TO COME TO [facility] ( [address]), CAN  PARK OR SELF PARK. ENTER THE PAVILION THRU MAIN ENTRANCE, TAKE ELEVATORS TO THE FIRST FLOOR, CHECK IN AT THE DESK FOR REGISTRATION, AFTER REGISTRATION PT WILL BE TAKEN THE THE PREOP AREA FOR HIS OR HER PROCEDURE.    -ARRIVAL TIME GIVEN BY SAME DAY SURGERY, YOU WILL RECEIVE A PHONE CALL BETWEEN 1PM AND 4PM, INFORMED PT IF ARRIVAL TIME CHANGES OR ADJUSTMENTS NEED TO BE MADE IN THEIR ARRIVAL TIME, HE/SHE WOULD RECEIVE A CALL.    -INSTRUCTED PT TO COME TO [facility] TO GET THEIR LABS/ EKG DONE PRIOR TO PROCEDURE      - PATIENT VERBALIZED UNDERSTANDING

## 2025-02-27 ENCOUNTER — APPOINTMENT (OUTPATIENT)
Dept: CARDIOLOGY | Facility: HOSPITAL | Age: 70
End: 2025-02-27
Payer: MEDICARE

## 2025-02-27 ENCOUNTER — HOSPITAL ENCOUNTER (OUTPATIENT)
Facility: HOSPITAL | Age: 70
Setting detail: HOSPITAL OUTPATIENT SURGERY
Discharge: HOME | End: 2025-02-27
Attending: INTERNAL MEDICINE | Admitting: INTERNAL MEDICINE
Payer: MEDICARE

## 2025-02-27 VITALS
HEART RATE: 55 BPM | TEMPERATURE: 98.3 F | DIASTOLIC BLOOD PRESSURE: 57 MMHG | OXYGEN SATURATION: 94 % | RESPIRATION RATE: 14 BRPM | SYSTOLIC BLOOD PRESSURE: 112 MMHG | BODY MASS INDEX: 37.77 KG/M2 | WEIGHT: 235.01 LBS | HEIGHT: 66 IN

## 2025-02-27 DIAGNOSIS — I25.10 CORONARY ARTERY DISEASE INVOLVING NATIVE CORONARY ARTERY OF NATIVE HEART WITHOUT ANGINA PECTORIS: ICD-10-CM

## 2025-02-27 DIAGNOSIS — R06.09 DYSPNEA ON EXERTION: ICD-10-CM

## 2025-02-27 LAB
ANION GAP SERPL CALCULATED.3IONS-SCNC: 13.8 MMOL/L (ref 5–15)
ASCENDING AORTA: 3 CM
AV MEAN PRESS GRAD SYS DOP V1V2: 6 MMHG
BH CV ECHO LEFT VENTRICLE BASAL CAVITARY GRADIENT: 103 MMHG
BH CV ECHO MEAS - AO ROOT DIAM: 3 CM
BH CV ECHO MEAS - AO V2 VTI: 48.2 CM
BH CV ECHO MEAS - EDV(CUBED): 60.2 ML
BH CV ECHO MEAS - EDV(MOD-SP2): 119 ML
BH CV ECHO MEAS - EDV(MOD-SP4): 149 ML
BH CV ECHO MEAS - EF(MOD-SP2): 74.8 %
BH CV ECHO MEAS - EF(MOD-SP4): 73.4 %
BH CV ECHO MEAS - ESV(CUBED): 9 ML
BH CV ECHO MEAS - ESV(MOD-SP2): 30 ML
BH CV ECHO MEAS - ESV(MOD-SP4): 39.6 ML
BH CV ECHO MEAS - FS: 46.9 %
BH CV ECHO MEAS - IVS/LVPW: 1.62 CM
BH CV ECHO MEAS - IVSD: 1.9 CM
BH CV ECHO MEAS - LA DIMENSION: 4.9 CM
BH CV ECHO MEAS - LAT PEAK E' VEL: 7.5 CM/SEC
BH CV ECHO MEAS - LV DIASTOLIC VOL/BSA (35-75): 69.8 CM2
BH CV ECHO MEAS - LV MASS(C)D: 234.6 GRAMS
BH CV ECHO MEAS - LV SYSTOLIC VOL/BSA (12-30): 18.6 CM2
BH CV ECHO MEAS - LVIDD: 3.9 CM
BH CV ECHO MEAS - LVIDS: 2.08 CM
BH CV ECHO MEAS - LVOT AREA: 3.5 CM2
BH CV ECHO MEAS - LVOT DIAM: 2.1 CM
BH CV ECHO MEAS - LVPWD: 1.17 CM
BH CV ECHO MEAS - MED PEAK E' VEL: 8.4 CM/SEC
BH CV ECHO MEAS - MV A MAX VEL: 118 CM/SEC
BH CV ECHO MEAS - MV DEC SLOPE: 458 CM/SEC2
BH CV ECHO MEAS - MV DEC TIME: 0.36 SEC
BH CV ECHO MEAS - MV E MAX VEL: 163 CM/SEC
BH CV ECHO MEAS - MV E/A: 1.38
BH CV ECHO MEAS - SV(MOD-SP2): 89 ML
BH CV ECHO MEAS - SV(MOD-SP4): 109.4 ML
BH CV ECHO MEAS - SVI(MOD-SP2): 41.7 ML/M2
BH CV ECHO MEAS - SVI(MOD-SP4): 51.3 ML/M2
BH CV ECHO MEAS - TAPSE (>1.6): 2.9 CM
BH CV ECHO MEASUREMENTS AVERAGE E/E' RATIO: 20.5
BH CV XLRA - RV BASE: 3.8 CM
BH CV XLRA - TDI S': 12.7 CM/SEC
BUN SERPL-MCNC: 14 MG/DL (ref 8–23)
BUN/CREAT SERPL: 13 (ref 7–25)
CALCIUM SPEC-SCNC: 8.8 MG/DL (ref 8.6–10.5)
CHLORIDE SERPL-SCNC: 101 MMOL/L (ref 98–107)
CO2 SERPL-SCNC: 21.2 MMOL/L (ref 22–29)
CREAT SERPL-MCNC: 1.08 MG/DL (ref 0.57–1)
DEPRECATED RDW RBC AUTO: 48.8 FL (ref 37–54)
EGFRCR SERPLBLD CKD-EPI 2021: 55.4 ML/MIN/1.73
ERYTHROCYTE [DISTWIDTH] IN BLOOD BY AUTOMATED COUNT: 13.7 % (ref 12.3–15.4)
GLUCOSE SERPL-MCNC: 122 MG/DL (ref 65–99)
HCT VFR BLD AUTO: 39.5 % (ref 34–46.6)
HGB BLD-MCNC: 12.9 G/DL (ref 12–15.9)
INR PPP: 1.11 (ref 0.86–1.15)
LEFT ATRIUM VOLUME INDEX: 34.9 ML/M2
LV EF BIPLANE MOD: 75.4 %
MCH RBC QN AUTO: 31.6 PG (ref 26.6–33)
MCHC RBC AUTO-ENTMCNC: 32.7 G/DL (ref 31.5–35.7)
MCV RBC AUTO: 96.8 FL (ref 79–97)
PLATELET # BLD AUTO: 210 10*3/MM3 (ref 140–450)
PMV BLD AUTO: 10.5 FL (ref 6–12)
POTASSIUM SERPL-SCNC: 3.6 MMOL/L (ref 3.5–5.2)
PROTHROMBIN TIME: 14.8 SECONDS (ref 11.8–14.9)
RBC # BLD AUTO: 4.08 10*6/MM3 (ref 3.77–5.28)
SINUS: 3.3 CM
SODIUM SERPL-SCNC: 136 MMOL/L (ref 136–145)
WBC NRBC COR # BLD AUTO: 9.21 10*3/MM3 (ref 3.4–10.8)

## 2025-02-27 PROCEDURE — 25010000002 SULFUR HEXAFLUORIDE MICROSPH 60.7-25 MG RECONSTITUTED SUSPENSION: Performed by: INTERNAL MEDICINE

## 2025-02-27 PROCEDURE — C1894 INTRO/SHEATH, NON-LASER: HCPCS | Performed by: INTERNAL MEDICINE

## 2025-02-27 PROCEDURE — 85027 COMPLETE CBC AUTOMATED: CPT

## 2025-02-27 PROCEDURE — 93306 TTE W/DOPPLER COMPLETE: CPT

## 2025-02-27 PROCEDURE — 25010000002 HEPARIN (PORCINE) PER 1000 UNITS: Performed by: INTERNAL MEDICINE

## 2025-02-27 PROCEDURE — 93306 TTE W/DOPPLER COMPLETE: CPT | Performed by: INTERNAL MEDICINE

## 2025-02-27 PROCEDURE — 25810000003 SODIUM CHLORIDE 0.9 % SOLUTION

## 2025-02-27 PROCEDURE — 25010000002 MIDAZOLAM PER 1MG: Performed by: INTERNAL MEDICINE

## 2025-02-27 PROCEDURE — 25010000002 FENTANYL CITRATE (PF) 50 MCG/ML SOLUTION: Performed by: INTERNAL MEDICINE

## 2025-02-27 PROCEDURE — 25510000001 IOPAMIDOL PER 1 ML: Performed by: INTERNAL MEDICINE

## 2025-02-27 PROCEDURE — 93458 L HRT ARTERY/VENTRICLE ANGIO: CPT | Performed by: INTERNAL MEDICINE

## 2025-02-27 PROCEDURE — 80048 BASIC METABOLIC PNL TOTAL CA: CPT

## 2025-02-27 PROCEDURE — 85610 PROTHROMBIN TIME: CPT

## 2025-02-27 PROCEDURE — C1769 GUIDE WIRE: HCPCS | Performed by: INTERNAL MEDICINE

## 2025-02-27 PROCEDURE — 25010000002 LIDOCAINE 2% SOLUTION: Performed by: INTERNAL MEDICINE

## 2025-02-27 RX ORDER — SODIUM CHLORIDE 0.9 % (FLUSH) 0.9 %
10 SYRINGE (ML) INJECTION AS NEEDED
Status: DISCONTINUED | OUTPATIENT
Start: 2025-02-27 | End: 2025-02-27 | Stop reason: HOSPADM

## 2025-02-27 RX ORDER — VERAPAMIL HYDROCHLORIDE 2.5 MG/ML
INJECTION, SOLUTION INTRAVENOUS
Status: DISCONTINUED | OUTPATIENT
Start: 2025-02-27 | End: 2025-02-27 | Stop reason: HOSPADM

## 2025-02-27 RX ORDER — MIDAZOLAM HYDROCHLORIDE 2 MG/2ML
INJECTION, SOLUTION INTRAMUSCULAR; INTRAVENOUS
Status: DISCONTINUED | OUTPATIENT
Start: 2025-02-27 | End: 2025-02-27 | Stop reason: HOSPADM

## 2025-02-27 RX ORDER — ACETAMINOPHEN 325 MG/1
650 TABLET ORAL EVERY 4 HOURS PRN
Status: DISCONTINUED | OUTPATIENT
Start: 2025-02-27 | End: 2025-02-27 | Stop reason: HOSPADM

## 2025-02-27 RX ORDER — FENTANYL CITRATE 50 UG/ML
INJECTION, SOLUTION INTRAMUSCULAR; INTRAVENOUS
Status: DISCONTINUED | OUTPATIENT
Start: 2025-02-27 | End: 2025-02-27 | Stop reason: HOSPADM

## 2025-02-27 RX ORDER — IOPAMIDOL 755 MG/ML
INJECTION, SOLUTION INTRAVASCULAR
Status: DISCONTINUED | OUTPATIENT
Start: 2025-02-27 | End: 2025-02-27 | Stop reason: HOSPADM

## 2025-02-27 RX ORDER — NITROGLYCERIN 0.4 MG/1
0.4 TABLET SUBLINGUAL
Status: DISCONTINUED | OUTPATIENT
Start: 2025-02-27 | End: 2025-02-27 | Stop reason: HOSPADM

## 2025-02-27 RX ORDER — FUROSEMIDE 40 MG/1
40 TABLET ORAL DAILY PRN
Qty: 30 TABLET | Refills: 2 | Status: SHIPPED | OUTPATIENT
Start: 2025-02-27

## 2025-02-27 RX ORDER — LIDOCAINE HYDROCHLORIDE 20 MG/ML
INJECTION, SOLUTION INFILTRATION; PERINEURAL
Status: DISCONTINUED | OUTPATIENT
Start: 2025-02-27 | End: 2025-02-27 | Stop reason: HOSPADM

## 2025-02-27 RX ORDER — SODIUM CHLORIDE 0.9 % (FLUSH) 0.9 %
10 SYRINGE (ML) INJECTION EVERY 12 HOURS SCHEDULED
Status: DISCONTINUED | OUTPATIENT
Start: 2025-02-27 | End: 2025-02-27 | Stop reason: HOSPADM

## 2025-02-27 RX ORDER — HEPARIN SODIUM 1000 [USP'U]/ML
INJECTION, SOLUTION INTRAVENOUS; SUBCUTANEOUS
Status: DISCONTINUED | OUTPATIENT
Start: 2025-02-27 | End: 2025-02-27 | Stop reason: HOSPADM

## 2025-02-27 RX ORDER — SODIUM CHLORIDE 9 MG/ML
40 INJECTION, SOLUTION INTRAVENOUS AS NEEDED
Status: DISCONTINUED | OUTPATIENT
Start: 2025-02-27 | End: 2025-02-27 | Stop reason: HOSPADM

## 2025-02-27 RX ADMIN — SODIUM CHLORIDE 318 ML: 9 INJECTION, SOLUTION INTRAVENOUS at 08:03

## 2025-02-27 RX ADMIN — SULFUR HEXAFLUORIDE 5 ML: KIT at 11:23

## 2025-02-27 NOTE — Clinical Note
Hemostasis started on the right subclavian artery. R-Band was used in achieving hemostasis. Radial compression device applied to vessel. Hemostasis achieved successfully. Closure device additional comment: 12ml of air

## 2025-02-27 NOTE — DISCHARGE INSTRUCTIONS
DISCHARGE INSTRUCTIONS  VASCULAR  PROCEDURES  TR BAND      For your surgery you had:  IV sedation.     You may experience dizziness, drowsiness, or light-headedness for several hours following surgery/procedure.  Do not stay alone today or tonight.  Limit your activity for 24 hours.  Resume your diet slowly.  Follow whatever special dietary instructions you may have been given by your doctor.  You should not drive or operate machinery, drink alcohol, or sign legally binding documents for 24 hours or while you are taking pain medication.    NOTIFY YOUR DOCTOR IF YOU EXPERIENCE ANY OF THE FOLLOWING:  Temperature greater than 101 degrees Fahrenheit  Shaking Chills  Redness or excessive drainage from incision  Nausea, vomiting and/or pain that is not controlled by prescribed medications  Increase in bleeding or bleeding that is excessive  Unable to urinate in 6 hours after surgery  If unable to reach your doctor, please go to the closest Emergency Room    Medications per physician instructions as indicated on After Visit Summary.    Last dose of pain medication was given at:   .    [x] TR BAND DISCHARGE INSTRUCTIONS:  For 24 hours after the procedure, or as directed by your health care provider:  Do not flex or bend the affected arm.  Do not push or pull heavy objects with the affected arm.  Do not operate machinery or power tools.  Keep affected arm elevated and rested for 48 hours.  Do not apply powder or lotion to the site.  Check your incision site every day for signs of infection. Check for:  Redness, swelling, or pain.  Fluid or blood.  Warmth.  Pus or a bad smell.  May remove bandaid: in 24 hours  Avoid manipulation of the wrist for 24 hours  After the dressing is removed, clean gently with soap and water, apply new dressing.   Avoid submerging site for one week or until healed.

## 2025-02-27 NOTE — Clinical Note
----- Message from Garrison Hastings sent at 7/27/2018  1:46 PM CDT -----  Regarding: Appointment Request ()  Contact: [phone]  Appointment Request From: Magnus Hastings    With Provider: Omid Dodge MD [-Primary Care Physician-]    Preferred Date Range: From 7/27/2018 To 7/31/2018    Preferred Times: Any    Reason: To address the following health maintenance concerns.  Asthma Control Test Q6 Mos    Comments:  Soonest availability possible please. Thank you!   Physician notified by staff.

## 2025-02-27 NOTE — Clinical Note
A 6 fr sheath was successfully inserted into the right subclavian artery. Sheath insertion not delayed.

## 2025-03-07 NOTE — PROGRESS NOTES
Primary Care Provider  Devora Johnson DO   Referring Provider  No ref. provider found      Patient Complaint  Follow-up (6 month)      Subjective          Elysia Babcock presents to [facility]    Patient or patient representative verbalized consent for the use of Ambient Listening during the visit with  JERSON Young for chart documentation. 3/11/2025  09:50 EDT    History of Presenting Illness  Elysia Babcock is a 69 y.o. female patient of Dr. Medina with possible pulmonary hypertension, hypertrophic cardiomyopathy, CHF, and exertional dyspnea, here for 6-month follow-up.    History of Present Illness  The patient presents for evaluation of dyspnea with exertion. She continues to experience respiratory distress, which she attributes moreso to her cardiac condition. She reports dyspnea on exertion that goes away at rest. She has no history of home oxygen therapy or hospitalizations due to respiratory issues. She has a rescue inhaler and a nebulizer machine, but she does not use the nebs because they make her heart race. She usually just uses the inhaler if she is wheezing, which does not happen very often. She has not tried any other inhalers. She has no history of anemia or peripheral edema, although she notes that severe episodes of dyspnea are accompanied by ankle swelling, a symptom that has been absent for several months. She has a significant exposure to secondhand smoke during her childhood. She has a history of 1 cardiac stent placement and underwent left cardiac catheterization with Dr. Gan 2 weeks ago, which revealed myocardial hypertrophy at the apex. She was informed that this condition could be managed medically, pending approval of Camzyos. Currently, she is not on any medication for this condition and continues to experience dyspnea.    SOCIAL HISTORY  She has never smoked but was exposed to secondhand smoke in childhood.    FAMILY  HISTORY  Her mother had severe lung problems and was hospitalized several times, likely due to smoking.    Pulmonary Meds  Albuterol inhaler    Pulmonary Equipment  None      Family History   Problem Relation Age of Onset    Asthma Mother     Depression Mother     Hyperlipidemia Mother     Heart disease Father     Heart failure Father         Social History     Socioeconomic History    Marital status:    Tobacco Use    Smoking status: Never     Passive exposure: Never    Smokeless tobacco: Never    Tobacco comments:     No second hand smoke exposure    Vaping Use    Vaping status: Never Used   Substance and Sexual Activity    Alcohol use: Yes     Alcohol/week: 6.0 standard drinks of alcohol     Types: 3 Glasses of wine, 3 Drinks containing 0.5 oz of alcohol per week     Comment: I enjoy happy hour.    Drug use: Never    Sexual activity: Not Currently     Partners: Male     Birth control/protection: None     Comment: I have been off the pill for 25 years.        Past Medical History:   Diagnosis Date    Allergic     Arthritis     Asthma     Bleeds easily     CHF (congestive heart failure)     Congenital heart disease     Coronary artery disease 2019    Depression 1990    Heart murmur     Hyperlipidemia     Hypertension     Obesity     Pleural effusion     Pneumonia 09/06/2023    Pulmonary arterial hypertension 05/2024    Rheumatoid arthritis     Visual impairment         Immunization History   Administered Date(s) Administered    ABRYSVO (RSV, 60+ or pregnant women 32-36 wks) 10/09/2023    COVID-19 (MODERNA) 12YRS+ (SPIKEVAX) 10/09/2023, 10/28/2024    FLUAD TRI 65YR+ 10/28/2024    Fluzone High-Dose 65+yrs 10/09/2023    Pneumococcal Conjugate 20-Valent (PCV20) 10/09/2023       No Known Allergies       Current Outpatient Medications:     acetaminophen (TYLENOL) 500 MG tablet, Take 1 tablet by mouth Every 6 (Six) Hours As Needed for Mild Pain., Disp: , Rfl:     albuterol sulfate  (90 Base) MCG/ACT inhaler,  "Inhale 2 puffs Every 4 (Four) Hours As Needed for Wheezing., Disp: 18 g, Rfl: 0    alendronate (FOSAMAX) 70 MG tablet, TAKE 1 TABLET BY MOUTH EVERY 7 DAYS, Disp: 12 tablet, Rfl: 0    aspirin 81 MG EC tablet, Take 1 tablet by mouth Daily., Disp: , Rfl:     atorvastatin (LIPITOR) 20 MG tablet, Take 1 tablet by mouth Daily., Disp: 90 tablet, Rfl: 3    FLUoxetine (PROzac) 40 MG capsule, TAKE 1 CAPSULE BY MOUTH DAILY, Disp: 30 capsule, Rfl: 3    furosemide (LASIX) 40 MG tablet, Take 1 tablet by mouth Daily As Needed (swelling, fluid retention)., Disp: 30 tablet, Rfl: 2    magnesium oxide (MAG-OX) 400 MG tablet, Take 1 tablet by mouth Daily., Disp: 30 tablet, Rfl: 5    metoprolol tartrate (LOPRESSOR) 50 MG tablet, Take 1 tablet by mouth 2 (Two) Times a Day., Disp: 180 tablet, Rfl: 3    ranolazine (Ranexa) 500 MG 12 hr tablet, Take 1 tablet by mouth 2 (Two) Times a Day., Disp: 180 tablet, Rfl: 3    spironolactone (ALDACTONE) 25 MG tablet, Take 1 tablet by mouth Daily., Disp: 90 tablet, Rfl: 3    vitamin D3 125 MCG (5000 UT) capsule capsule, Take 1 capsule by mouth Daily., Disp: , Rfl:      Objective     Vital Signs:   /77 (BP Location: Left arm, Patient Position: Sitting, Cuff Size: Adult)   Pulse (!) 49   Temp 97.1 °F (36.2 °C) (Oral)   Resp 18   Ht 167.6 cm (66\")   Wt 102 kg (224 lb 12.8 oz)   SpO2 95% Comment: ROOM AIR  BMI 36.28 kg/m²     Physical Exam  Constitutional:       General: She is not in acute distress.     Appearance: Normal appearance. She is normal weight. She is not ill-appearing.   HENT:      Right Ear: Tympanic membrane and ear canal normal.      Left Ear: Tympanic membrane and ear canal normal.      Nose: Nose normal.      Mouth/Throat:      Mouth: Mucous membranes are moist.      Pharynx: Oropharynx is clear.   Cardiovascular:      Rate and Rhythm: Normal rate and regular rhythm.      Pulses: Normal pulses.      Heart sounds: Normal heart sounds.   Pulmonary:      Effort: Pulmonary effort " is normal. No respiratory distress.      Breath sounds: Normal breath sounds. No stridor. No wheezing, rhonchi or rales.   Musculoskeletal:         General: No swelling. Normal range of motion.      Cervical back: Normal range of motion and neck supple.      Right lower leg: No edema.      Left lower leg: No edema.   Skin:     General: Skin is warm and dry.   Neurological:      General: No focal deficit present.      Mental Status: She is alert and oriented to person, place, and time.      Motor: No weakness.   Psychiatric:         Mood and Affect: Mood normal.         Behavior: Behavior normal.          Result Review :   I have personally reviewed patient's labs and images.  I also reviewed Dr. Medina's last progress note 9/4/2024.    Results    -PFTs 7/16/2024 showed normal spirometry without significant response to bronchodilator.  Lung volumes normal, DLCO reduced 50% of predicted.  Patient with history of mild anemia.  -CT chest with contrast 5/17/2024 negative for pulmonary embolus.  There was cardiomegaly, pulmonary edema with small bilateral pleural effusions.  Dilated main pulmonary artery similar to prior study which could relate to pulmonary hypertension.  -Echocardiogram 2/27/2025 EF hyperdynamic greater than 70%.  Left ventricular diastolic function consistent with grade 2 impaired relaxation.  RVSP not estimated.            Diagnoses and all orders for this visit:    1. Pulmonary hypertension (Primary)    2. Dyspnea on exertion    3. Chronic diastolic congestive heart failure    4. Hypertrophic cardiomyopathy       Assessment & Plan  1. Dyspnea.  Her dyspnea is likely attributable primarily to cardiac condition, possible slightly pulmonary related. A pulmonary function test conducted last year did not indicate the presence of COPD or severe asthma, although a definitive exclusion of asthma can not be made based on this test alone. Her oxygen diffusion capacity was observed to be diminished, at 50% of  predicted. Her oxygen saturation today was recorded at 95 percent, and her blood pressure readings were within normal limits. From a pulmonary perspective, we will continue her current medications.    2. Possible pulmonary hypertension.  She has been informed that her shortness of breath could be in part due to pulmonary hypertension, which is high pressure in the pulmonary artery. She recently had a left heart cath, but has never had RHC. If pulmonary hypertension present, likely group 2. She is awaiting PA for Camzyos, a specialized medication for her hypertrophic cardiomyopathy. Hopefully this will help her dyspnea.    Follow-up  The patient will follow up in 9 months with MD, may return sooner if needed    Smoking status: Reviewed  Vaccination status: Patient reports he is up-to-date with his flu, pneumonia, and Covid vaccines.  Patient is advised to continue to follow CDC recommendations such as social distancing wearing a mask and washing hands for at least 20 seconds.  Medications personally reviewed    Follow Up   No follow-ups on file.  Patient was given instructions and counseling regarding her condition or for health maintenance advice. Please see specific information pulled into the AVS if appropriate.

## 2025-03-11 ENCOUNTER — OFFICE VISIT (OUTPATIENT)
Dept: PULMONOLOGY | Facility: CLINIC | Age: 70
End: 2025-03-11
Payer: MEDICARE

## 2025-03-11 VITALS
BODY MASS INDEX: 36.13 KG/M2 | HEART RATE: 49 BPM | OXYGEN SATURATION: 95 % | DIASTOLIC BLOOD PRESSURE: 77 MMHG | TEMPERATURE: 97.1 F | SYSTOLIC BLOOD PRESSURE: 122 MMHG | HEIGHT: 66 IN | WEIGHT: 224.8 LBS | RESPIRATION RATE: 18 BRPM

## 2025-03-11 DIAGNOSIS — I50.32 CHRONIC DIASTOLIC CONGESTIVE HEART FAILURE: ICD-10-CM

## 2025-03-11 DIAGNOSIS — I27.20 PULMONARY HYPERTENSION: Primary | ICD-10-CM

## 2025-03-11 DIAGNOSIS — I42.2 HYPERTROPHIC CARDIOMYOPATHY: ICD-10-CM

## 2025-03-11 DIAGNOSIS — R06.09 DYSPNEA ON EXERTION: ICD-10-CM

## 2025-03-11 PROCEDURE — 3074F SYST BP LT 130 MM HG: CPT

## 2025-03-11 PROCEDURE — 99214 OFFICE O/P EST MOD 30 MIN: CPT

## 2025-03-11 PROCEDURE — 1160F RVW MEDS BY RX/DR IN RCRD: CPT

## 2025-03-11 PROCEDURE — 3078F DIAST BP <80 MM HG: CPT

## 2025-03-11 PROCEDURE — 1159F MED LIST DOCD IN RCRD: CPT

## 2025-04-02 ENCOUNTER — DOCUMENTATION (OUTPATIENT)
Dept: CARDIOLOGY | Facility: CLINIC | Age: 70
End: 2025-04-02
Payer: MEDICARE

## 2025-04-02 ENCOUNTER — OFFICE VISIT (OUTPATIENT)
Dept: CARDIOLOGY | Facility: CLINIC | Age: 70
End: 2025-04-02
Payer: MEDICARE

## 2025-04-02 VITALS
HEIGHT: 66 IN | BODY MASS INDEX: 36.32 KG/M2 | DIASTOLIC BLOOD PRESSURE: 84 MMHG | SYSTOLIC BLOOD PRESSURE: 144 MMHG | HEART RATE: 61 BPM | WEIGHT: 226 LBS

## 2025-04-02 DIAGNOSIS — I25.10 CORONARY ARTERY DISEASE INVOLVING NATIVE CORONARY ARTERY OF NATIVE HEART WITHOUT ANGINA PECTORIS: Primary | ICD-10-CM

## 2025-04-02 DIAGNOSIS — I10 PRIMARY HYPERTENSION: ICD-10-CM

## 2025-04-02 DIAGNOSIS — I42.1 HOCM (HYPERTROPHIC OBSTRUCTIVE CARDIOMYOPATHY): ICD-10-CM

## 2025-04-02 DIAGNOSIS — E78.2 MIXED HYPERLIPIDEMIA: ICD-10-CM

## 2025-04-02 PROCEDURE — 3077F SYST BP >= 140 MM HG: CPT | Performed by: INTERNAL MEDICINE

## 2025-04-02 PROCEDURE — 1160F RVW MEDS BY RX/DR IN RCRD: CPT | Performed by: INTERNAL MEDICINE

## 2025-04-02 PROCEDURE — 99214 OFFICE O/P EST MOD 30 MIN: CPT | Performed by: INTERNAL MEDICINE

## 2025-04-02 PROCEDURE — 3078F DIAST BP <80 MM HG: CPT | Performed by: INTERNAL MEDICINE

## 2025-04-02 PROCEDURE — 1159F MED LIST DOCD IN RCRD: CPT | Performed by: INTERNAL MEDICINE

## 2025-04-02 RX ORDER — RANOLAZINE 500 MG/1
500 TABLET, EXTENDED RELEASE ORAL 2 TIMES DAILY
Qty: 180 TABLET | Refills: 3 | Status: SHIPPED | OUTPATIENT
Start: 2025-04-02

## 2025-04-02 NOTE — PROGRESS NOTES
Chief Complaint  6 wk follow up , Cardiomyopathy, Coronary Artery Disease, Hypertension, Hyperlipidemia, and medication concern  (ranexa)    Subjective            Elysia Babcock presents to [facility] CARDIOLOGY  Cardiomyopathy  Coronary Artery Disease  Symptoms include shortness of breath. Risk factors include hyperlipidemia.   Hypertension  Associated symptoms include malaise/fatigue and shortness of breath.   Hyperlipidemia  Associated symptoms include shortness of breath.       Ms. Babcock is here for follow-up evaluation management of coronary artery disease with previous PCI, hypertrophic obstructive cardiomyopathy diagnosed definitively recently on cardiac catheterization and echo, mixed hyperlipidemia and primary hypertension.  Since cardiac catheterization she continues to have significant shortness of breath on exertion.  We are in the process of getting her approved to start Camzyos but she has not started the medication yet.  Her echo intracavitary gradient was 103 mmHg, however by catheterization the resting gradient was 115 mmHg with post PVC LV pressure measuring above 350 mmHg.    PMH  Past Medical History:   Diagnosis Date    Allergic     Arthritis     Asthma     Bleeds easily     CHF (congestive heart failure)     Congenital heart disease     Coronary artery disease     Depression     Heart murmur     Hyperlipidemia     Hypertension     Obesity     Pleural effusion     Pneumonia 2023    Pulmonary arterial hypertension 2024    Rheumatoid arthritis     Visual impairment          SURGICALHX  Past Surgical History:   Procedure Laterality Date    CARDIAC CATHETERIZATION N/A 2025    Procedure: Left Heart Cath with Cors and SVG's;  Surgeon: GENESIS Gan MD;  Location: [facility] CATH INVASIVE LOCATION;  Service: Cardiovascular;  Laterality: N/A;    CATARACT EXTRACTION Bilateral      SECTION      CHOLECYSTECTOMY      COLONOSCOPY      COLONOSCOPY N/A 10/01/2024     Procedure: COLONOSCOPY;  Surgeon: Michael Mcknight MD;  Location: [facility] ENDOSCOPY;  Service: General;  Laterality: N/A;  diverticulosis    CORONARY STENT PLACEMENT      HIP BIPOLAR REPLACEMENT      JOINT REPLACEMENT  2021    TONSILLECTOMY  1961        SOC  Social History     Socioeconomic History    Marital status:    Tobacco Use    Smoking status: Never     Passive exposure: Never    Smokeless tobacco: Never    Tobacco comments:     No second hand smoke exposure    Vaping Use    Vaping status: Never Used   Substance and Sexual Activity    Alcohol use: Yes     Alcohol/week: 6.0 standard drinks of alcohol     Types: 3 Glasses of wine, 3 Drinks containing 0.5 oz of alcohol per week     Comment: I enjoy happy hour.    Drug use: Never    Sexual activity: Not Currently     Partners: Male     Birth control/protection: None     Comment: I have been off the pill for 25 years.         FAMHX  Family History   Problem Relation Age of Onset    Asthma Mother     Depression Mother     Hyperlipidemia Mother     Heart disease Father     Heart failure Father           ALLERGY  No Known Allergies     MEDSCURRENT    Current Outpatient Medications:     acetaminophen (TYLENOL) 500 MG tablet, Take 1 tablet by mouth Every 6 (Six) Hours As Needed for Mild Pain., Disp: , Rfl:     albuterol sulfate  (90 Base) MCG/ACT inhaler, Inhale 2 puffs Every 4 (Four) Hours As Needed for Wheezing., Disp: 18 g, Rfl: 0    alendronate (FOSAMAX) 70 MG tablet, TAKE 1 TABLET BY MOUTH EVERY 7 DAYS, Disp: 12 tablet, Rfl: 0    aspirin 81 MG EC tablet, Take 1 tablet by mouth Daily., Disp: , Rfl:     atorvastatin (LIPITOR) 20 MG tablet, Take 1 tablet by mouth Daily., Disp: 90 tablet, Rfl: 3    FLUoxetine (PROzac) 40 MG capsule, TAKE 1 CAPSULE BY MOUTH DAILY, Disp: 30 capsule, Rfl: 3    furosemide (LASIX) 40 MG tablet, Take 1 tablet by mouth Daily As Needed (swelling, fluid retention)., Disp: 30 tablet, Rfl: 2    magnesium oxide (MAG-OX) 400  "MG tablet, Take 1 tablet by mouth Daily., Disp: 30 tablet, Rfl: 5    metoprolol tartrate (LOPRESSOR) 50 MG tablet, Take 1 tablet by mouth 2 (Two) Times a Day., Disp: 180 tablet, Rfl: 3    ranolazine (Ranexa) 500 MG 12 hr tablet, Take 1 tablet by mouth 2 (Two) Times a Day., Disp: 180 tablet, Rfl: 3    spironolactone (ALDACTONE) 25 MG tablet, Take 1 tablet by mouth Daily., Disp: 90 tablet, Rfl: 3    vitamin D3 125 MCG (5000 UT) capsule capsule, Take 1 capsule by mouth Daily., Disp: , Rfl:       Review of Systems   Constitutional: Positive for malaise/fatigue.   Cardiovascular:  Positive for dyspnea on exertion.   Respiratory:  Positive for shortness of breath.         Objective     /84   Pulse 61   Ht 167.6 cm (66\")   Wt 103 kg (226 lb)   BMI 36.48 kg/m²       General Appearance:   well developed  well nourished  HENT:   oropharynx moist  lips not cyanotic  Neck:  thyroid not enlarged  supple  Respiratory:  no respiratory distress  normal breath sounds  no rales  Cardiovascular:  no jugular venous distention  regular rhythm  apical impulse normal  S1 normal, S2 normal  no S3, no S4   Grade 2 basal systolic murmur  no rub, no thrill  carotid pulses normal; no bruit  pedal pulses normal  lower extremity edema: none    Musculoskeletal:  no clubbing of fingers.   normocephalic, head atraumatic  Skin:   warm, dry  Psychiatric:  judgement and insight appropriate  normal mood and affect      Result Review :     The following data was reviewed by: Armin Gan MD on 04/02/2025:    CMP          5/17/2024    10:53 11/20/2024    14:57 2/27/2025    07:59   CMP   Glucose 117  98  122    BUN 17  29  14    Creatinine 0.83  0.97  1.08    EGFR 76.4  63.0  55.4    Sodium 137  136  136    Potassium 4.0  4.3  3.6    Chloride 103  102  101    Calcium 8.9  9.7  8.8    Total Protein 6.7  7.0     Albumin 3.9  4.1     Globulin 2.8  2.9     Total Bilirubin 1.0  0.6     Alkaline Phosphatase 60  65     AST (SGOT) 17  19  "    ALT (SGPT) 13  17     Albumin/Globulin Ratio 1.4  1.4     BUN/Creatinine Ratio 20.5  29.9  13.0    Anion Gap 11.8  9.4  13.8      CBC          5/17/2024    10:53 11/20/2024    14:57 2/27/2025    07:59   CBC   WBC 6.57  6.31  9.21    RBC 3.62  4.08  4.08    Hemoglobin 12.1  13.4  12.9    Hematocrit 37.4  40.6  39.5    .3  99.5  96.8    MCH 33.4  32.8  31.6    MCHC 32.4  33.0  32.7    RDW 15.8  13.6  13.7    Platelets 181  163  210      Lipid Panel          11/20/2024    14:57   Lipid Panel   Total Cholesterol 256    Triglycerides 106    HDL Cholesterol 58    VLDL Cholesterol 19    LDL Cholesterol  179    LDL/HDL Ratio 3.05      TSH          11/20/2024    14:57   TSH   TSH 2.480        Data reviewed : Cardiac catheterization and echo data reviewed      Procedures                Assessment and Plan        ASSESSMENT:  Encounter Diagnoses   Name Primary?    Coronary artery disease involving native coronary artery of native heart without angina pectoris Yes    HOCM (hypertrophic obstructive cardiomyopathy)     Mixed hyperlipidemia     Primary hypertension          PLAN:    1.  Coronary artery disease, native vessel without angina-recent catheterization showed patent LAD stent with jailed diagonal.  Continue secondary prevention medical therapy  2.  Hypertrophic obstructive cardiomyopathy-markedly elevated intracavitary gradient and LV pressure by catheterization.  Awaiting approval to start Camzyos, follow-up echocardiography will be done to evaluate response to therapy.  3.  Mixed hyperlipidemia-stable, continue statin therapy  4.  Primary hypertension-the patient is somewhat anxious and in distress today regarding the new medication and when it will be started.  She will monitor her blood pressures at home    We will notify the patient when the medication can be started, follow-up echocardiography will be scheduled, otherwise she will be scheduled for follow-up in 3 months          Patient was given  instructions and counseling regarding her condition or for health maintenance advice. Please see specific information pulled into the AVS if appropriate.             GENESIS Gan MD  4/2/2025    10:20 EDT

## 2025-04-02 NOTE — PROGRESS NOTES
Spoke with Bridgett in regards to patients enrollment. They were originally waiting to talk to patient, I gave patient that number to call to complete enrollment. Bridgett states that there is another form that was sent to patient that needs to be signed. PA has been approved through insurance, waiting on approval letter to be faxed to upload in media. Once final form has been signed by patient she will receive shipment of medication to begin and office will be notified.     Yvette Perez, Pharm.D.

## 2025-04-08 ENCOUNTER — OFFICE VISIT (OUTPATIENT)
Dept: FAMILY MEDICINE CLINIC | Facility: CLINIC | Age: 70
End: 2025-04-08
Payer: MEDICARE

## 2025-04-08 VITALS
BODY MASS INDEX: 37.22 KG/M2 | SYSTOLIC BLOOD PRESSURE: 133 MMHG | TEMPERATURE: 97.5 F | HEART RATE: 60 BPM | HEIGHT: 66 IN | DIASTOLIC BLOOD PRESSURE: 54 MMHG | WEIGHT: 231.6 LBS | RESPIRATION RATE: 17 BRPM | OXYGEN SATURATION: 90 %

## 2025-04-08 DIAGNOSIS — I10 PRIMARY HYPERTENSION: Chronic | ICD-10-CM

## 2025-04-08 DIAGNOSIS — E66.01 CLASS 2 SEVERE OBESITY DUE TO EXCESS CALORIES WITH SERIOUS COMORBIDITY AND BODY MASS INDEX (BMI) OF 37.0 TO 37.9 IN ADULT: ICD-10-CM

## 2025-04-08 DIAGNOSIS — R05.1 ACUTE COUGH: Primary | ICD-10-CM

## 2025-04-08 DIAGNOSIS — E78.2 MIXED HYPERLIPIDEMIA: Chronic | ICD-10-CM

## 2025-04-08 DIAGNOSIS — E66.812 CLASS 2 SEVERE OBESITY DUE TO EXCESS CALORIES WITH SERIOUS COMORBIDITY AND BODY MASS INDEX (BMI) OF 37.0 TO 37.9 IN ADULT: ICD-10-CM

## 2025-04-08 RX ORDER — HYDROCODONE POLISTIREX AND CHLORPHENIRAMINE POLISTIREX 10; 8 MG/5ML; MG/5ML
5 SUSPENSION, EXTENDED RELEASE ORAL EVERY 12 HOURS PRN
Qty: 100 ML | Refills: 0 | Status: SHIPPED | OUTPATIENT
Start: 2025-04-08

## 2025-04-08 NOTE — PROGRESS NOTES
"Chief Complaint  Cough (On going for roughly a week per pt. States that it starts as a \"tickle\" then turns into an incessant cough.) and Shortness of Breath (States that she knows it's d/t her heart issues.)    Subjective        Elysia Babcock presents to [facility]  History of Present Illness  She is here today for an acute visit and for follow-up for her chronic medical conditions.  She is  and lives with her son and daughter-in-law.  She spent quite a bit of her adult life in [place].  She was a  and she is now retired.  She has obesity, coronary artery disease status post stent x1 in 2017, major depression, hypertension, hyperlipidemia and vitamin D deficiency. Her dad had cad.       She is having a cough for the past week. She denies fever or chills. It is not productive.      The patient has no other complaints today and denies chest pain, weakness, numbness, nausea, vomiting, diarrhea, dizziness or syncopal event.         Checkup  Symptoms are: chronic.   Onset was at an unknown time.   Symptoms occur: daily.  Symptoms include: cough.   Pertinent negative symptoms include no abdominal pain, no anorexia, no joint pain, no change in stool, no chest pain, no chills, no congestion, no diaphoresis, no fatigue, no fever, no headaches, no joint swelling, no myalgias, no nausea, no neck pain, no numbness, no rash, no sore throat, no swollen glands, no dysuria, no vertigo, no visual change, no vomiting and no weakness.   Treatment and/or Medications comments include: Drugs are listed in chart.   Cough  Associated symptoms include shortness of breath. Pertinent negatives include no chest pain, chills, fever, headaches, myalgias, rash or sore throat.   Shortness of Breath  Pertinent negatives include no abdominal pain, chest pain, fever, headaches, neck pain, rash, sore throat, swollen glands or vomiting.           Objective   Vital Signs:  /54 (BP Location: Left " "arm, Patient Position: Sitting, Cuff Size: Adult)   Pulse 60   Temp 97.5 °F (36.4 °C) (Temporal)   Resp 17   Ht 167.6 cm (65.98\")   Wt 105 kg (231 lb 9.6 oz)   SpO2 90%   BMI 37.40 kg/m²   Estimated body mass index is 37.4 kg/m² as calculated from the following:    Height as of this encounter: 167.6 cm (65.98\").    Weight as of this encounter: 105 kg (231 lb 9.6 oz).            Physical Exam  Vitals reviewed.   Constitutional:       Appearance: She is well-developed. She is morbidly obese.   HENT:      Head: Normocephalic and atraumatic.      Right Ear: External ear normal.      Left Ear: External ear normal.      Mouth/Throat:      Pharynx: No oropharyngeal exudate.   Eyes:      Conjunctiva/sclera: Conjunctivae normal.      Pupils: Pupils are equal, round, and reactive to light.   Neck:      Vascular: No carotid bruit.   Cardiovascular:      Rate and Rhythm: Normal rate and regular rhythm.      Heart sounds: No murmur heard.     No friction rub. No gallop.   Pulmonary:      Effort: Pulmonary effort is normal.      Breath sounds: Normal breath sounds. No wheezing or rhonchi.   Abdominal:      General: There is no distension.   Skin:     General: Skin is warm and dry.   Neurological:      Mental Status: She is alert and oriented to person, place, and time.      Cranial Nerves: No cranial nerve deficit.      Motor: No weakness.   Psychiatric:         Mood and Affect: Mood and affect normal.         Behavior: Behavior normal.         Thought Content: Thought content normal.         Judgment: Judgment normal.        Result Review :    CMP          5/17/2024    10:53 11/20/2024    14:57 2/27/2025    07:59   CMP   Glucose 117  98  122    BUN 17  29  14    Creatinine 0.83  0.97  1.08    EGFR 76.4  63.0  55.4    Sodium 137  136  136    Potassium 4.0  4.3  3.6    Chloride 103  102  101    Calcium 8.9  9.7  8.8    Total Protein 6.7  7.0     Albumin 3.9  4.1     Globulin 2.8  2.9     Total Bilirubin 1.0  0.6   "   Alkaline Phosphatase 60  65     AST (SGOT) 17  19     ALT (SGPT) 13  17     Albumin/Globulin Ratio 1.4  1.4     BUN/Creatinine Ratio 20.5  29.9  13.0    Anion Gap 11.8  9.4  13.8      CBC          5/17/2024    10:53 11/20/2024    14:57 2/27/2025    07:59   CBC   WBC 6.57  6.31  9.21    RBC 3.62  4.08  4.08    Hemoglobin 12.1  13.4  12.9    Hematocrit 37.4  40.6  39.5    .3  99.5  96.8    MCH 33.4  32.8  31.6    MCHC 32.4  33.0  32.7    RDW 15.8  13.6  13.7    Platelets 181  163  210      Lipid Panel          11/20/2024    14:57   Lipid Panel   Total Cholesterol 256    Triglycerides 106    HDL Cholesterol 58    VLDL Cholesterol 19    LDL Cholesterol  179    LDL/HDL Ratio 3.05      TSH          11/20/2024    14:57   TSH   TSH 2.480                Assessment and Plan   Diagnoses and all orders for this visit:    1. Acute cough (Primary)  Assessment & Plan:  I believe her cough is due to allergies or a virus. No sign of pneumonia on PE.   -     Hydrocod Omar-Chlorphe Omar ER (TUSSIONEX PENNKINETIC) 10-8 MG/5ML ER suspension; Take 5 mL by mouth Every 12 (Twelve) Hours As Needed for Cough.  Dispense: 100 mL; Refill: 0    2. Class 2 severe obesity due to excess calories with serious comorbidity and body mass index (BMI) of 37.0 to 37.9 in adult  Assessment & Plan:  Patient's (Body mass index is 37.4 kg/m².) indicates that they are obese (BMI >30) with health conditions that include hypertension and dyslipidemias . Weight is worsening. BMI  is above average; BMI management plan is completed. We discussed low calorie, low carb based diet program, portion control, and increasing exercise.       3. Mixed hyperlipidemia  Assessment & Plan:   Lipid abnormalities are improving with treatment    Plan:  Continue same medication/s without change.      Discussed medication dosage, use, side effects, and goals of treatment in detail.    Counseled patient on lifestyle modifications to help control hyperlipidemia.   Cholesterol  lowering dietary information shared with patient.    Patient Treatment Goals:   LDL goal is under 100    Followup in 6 months.      4. Primary hypertension  Assessment & Plan:  Hypertension is stable and controlled  Continue current treatment regimen.  Dietary sodium restriction.  Weight loss.  Blood pressure will be reassessed in 6 months.           Follow Up   Return in about 6 months (around 10/8/2025).  Patient was given instructions and counseling regarding her condition or for health maintenance advice. Please see specific information pulled into the AVS if appropriate.         Patient or patient representative verbalized consent for the use of Ambient Listening during the visit with  Devora Johnson DO for chart documentation. 4/13/2025  23:00 EDT

## 2025-04-10 ENCOUNTER — TELEPHONE (OUTPATIENT)
Dept: CARDIOLOGY | Facility: CLINIC | Age: 70
End: 2025-04-10
Payer: MEDICARE

## 2025-04-10 NOTE — TELEPHONE ENCOUNTER
Tried to call patient to see if she had completed all the requirements for her MyCamzyos enrollment to be able to get her started on the medication.    Yvette Perez, Pharm.D.

## 2025-04-13 PROBLEM — J18.1 LOBAR PNEUMONIA: Status: RESOLVED | Noted: 2023-09-14 | Resolved: 2025-04-13

## 2025-04-13 PROBLEM — R05.1 ACUTE COUGH: Status: ACTIVE | Noted: 2025-04-13

## 2025-04-13 PROBLEM — I42.2 HYPERTROPHIC CARDIOMYOPATHY: Chronic | Status: ACTIVE | Noted: 2023-08-28

## 2025-04-13 PROBLEM — Z12.11 SCREENING FOR MALIGNANT NEOPLASM OF COLON: Status: RESOLVED | Noted: 2024-04-16 | Resolved: 2025-04-13

## 2025-04-13 PROBLEM — E66.01 CLASS 2 SEVERE OBESITY DUE TO EXCESS CALORIES WITH SERIOUS COMORBIDITY AND BODY MASS INDEX (BMI) OF 37.0 TO 37.9 IN ADULT: Status: ACTIVE | Noted: 2023-05-04

## 2025-04-13 PROBLEM — R06.09 DYSPNEA ON EXERTION: Chronic | Status: ACTIVE | Noted: 2025-02-18

## 2025-04-13 PROBLEM — E66.01 CLASS 2 SEVERE OBESITY DUE TO EXCESS CALORIES WITH SERIOUS COMORBIDITY AND BODY MASS INDEX (BMI) OF 37.0 TO 37.9 IN ADULT: Chronic | Status: ACTIVE | Noted: 2023-05-04

## 2025-04-14 NOTE — ASSESSMENT & PLAN NOTE
Patient's (Body mass index is 37.4 kg/m².) indicates that they are obese (BMI >30) with health conditions that include hypertension and dyslipidemias . Weight is worsening. BMI  is above average; BMI management plan is completed. We discussed low calorie, low carb based diet program, portion control, and increasing exercise.

## 2025-04-17 ENCOUNTER — TELEPHONE (OUTPATIENT)
Dept: CARDIOLOGY | Facility: CLINIC | Age: 70
End: 2025-04-17
Payer: MEDICARE

## 2025-04-17 NOTE — TELEPHONE ENCOUNTER
SW patient and she is having difficulties with the electronic signatures for [employer]. I told her that there were paper forms she could sign and that I would reach out to [facility] to have those mailed to her.     [facility], they are going to get forms mailed within 24 hours of phone call with directions for return mail.     I will follow back up with [facility] and patient in a week to see if everything has been completed to be able to start medication.    Yvette Perez, Pharm.D.

## 2025-04-18 RX ORDER — FLUOXETINE HYDROCHLORIDE 40 MG/1
40 CAPSULE ORAL DAILY
Qty: 30 CAPSULE | Refills: 3 | Status: SHIPPED | OUTPATIENT
Start: 2025-04-18

## 2025-05-02 ENCOUNTER — TELEPHONE (OUTPATIENT)
Dept: CARDIOLOGY | Facility: CLINIC | Age: 70
End: 2025-05-02
Payer: MEDICARE

## 2025-05-02 NOTE — TELEPHONE ENCOUNTER
Tried to call patient to check on if she has finished the paperwork for MyCamzyos and if they have got in touch with her to start her free trial. I can go ahead and help get [facility] filled out and rx sent to [facility] for when she needs the rx from them, just need to talk to patient. Left my direct line for call back.    Yvette Perez, Pharm.D.

## 2025-05-06 ENCOUNTER — TELEPHONE (OUTPATIENT)
Dept: CARDIOLOGY | Facility: CLINIC | Age: 70
End: 2025-05-06
Payer: MEDICARE

## 2025-05-06 ENCOUNTER — SPECIALTY PHARMACY (OUTPATIENT)
Facility: HOSPITAL | Age: 70
End: 2025-05-06
Payer: MEDICARE

## 2025-05-06 RX ORDER — MAVACAMTEN 2.5 MG/1
2.5 CAPSULE, GELATIN COATED ORAL DAILY
Qty: 30 CAPSULE | Refills: 1 | Status: SHIPPED | OUTPATIENT
Start: 2025-05-06

## 2025-05-06 NOTE — TELEPHONE ENCOUNTER
VERIFY REASON FOR PATIENT STARTING AT 2.5MG CAMZYOS DOSE.      ALSO, FLUOXETINE IS CONTRAINDICATED WITH CAMZYOS.

## 2025-05-06 NOTE — PROGRESS NOTES
Specialty Pharmacy Patient Management Program  Cardiology Initial Assessment     Elysia Babcock was referred by a Cardiology provider to the Cardiology Patient Management program offered by [facility] for Cardiomyopathy on 05/06/25.  An initial outreach was conducted, including assessment of therapy appropriateness and specialty medication education for Camzyos. The patient was introduced to services offered by [facility], including: regular assessments, refill coordination, mail order delivery options, prior authorization maintenance, and financial assistance programs as applicable. The patient was also provided with contact information for the pharmacy team.     Insurance Coverage & Financial Support  [employer] [facility]     Relevant Past Medical History and Comorbidities  Relevant medical history and concomitant health conditions were discussed with the patient. The patient's chart has been reviewed for relevant past medical history and comorbid conditions and updated as necessary.  Past Medical History:   Diagnosis Date    Allergic     Arthritis     Asthma     Bleeds easily     CHF (congestive heart failure)     Congenital heart disease     Coronary artery disease 2019    Depression 1990    Heart murmur     Hyperlipidemia     Hypertension     Obesity     Pleural effusion     Pneumonia 09/06/2023    Pulmonary arterial hypertension 05/2024    Rheumatoid arthritis     Visual impairment      Social History     Socioeconomic History    Marital status:    Tobacco Use    Smoking status: Never     Passive exposure: Never    Smokeless tobacco: Never    Tobacco comments:     No second hand smoke exposure    Vaping Use    Vaping status: Never Used   Substance and Sexual Activity    Alcohol use: Yes     Alcohol/week: 6.0 standard drinks of alcohol     Types: 3 Glasses of wine, 3 Drinks containing 0.5 oz of alcohol per week      Comment: I enjoy happy hour.    Drug use: Never    Sexual activity: Not Currently     Partners: Male     Birth control/protection: None     Comment: I have been off the pill for 25 years.            Allergies  Known allergies and reactions were discussed with the patient. The patient's chart has been reviewed for  allergy information and updated as necessary.   No Known Allergies         Relevant Laboratory Values  Relevant laboratory values were discussed with the patient. The following specialty medication dose adjustment(s) are recommended: Camzyos    Lab Results   Component Value Date    GLUCOSE 122 (H) 02/27/2025    CALCIUM 8.8 02/27/2025     02/27/2025    K 3.6 02/27/2025    CO2 21.2 (L) 02/27/2025     02/27/2025    BUN 14 02/27/2025    CREATININE 1.08 (H) 02/27/2025    BCR 13.0 02/27/2025    ANIONGAP 13.8 02/27/2025     Lab Results   Component Value Date    CHOL 256 (H) 11/20/2024    TRIG 106 11/20/2024    HDL 58 11/20/2024     (H) 11/20/2024         Current Medication List  This medication list has been reviewed with the patient and evaluated for any interactions or necessary modifications/recommendations, and updated to include all prescription medications, OTC medications, and supplements the patient is currently taking.  This list reflects what is contained in the patient's profile, which has also been marked as reviewed to communicate to other providers it is the most up to date version of the patient's current medication therapy.     Current Outpatient Medications:     acetaminophen (TYLENOL) 500 MG tablet, Take 1 tablet by mouth Every 6 (Six) Hours As Needed for Mild Pain., Disp: , Rfl:     albuterol sulfate  (90 Base) MCG/ACT inhaler, Inhale 2 puffs Every 4 (Four) Hours As Needed for Wheezing., Disp: 18 g, Rfl: 0    alendronate (FOSAMAX) 70 MG tablet, TAKE 1 TABLET BY MOUTH EVERY 7 DAYS, Disp: 12 tablet, Rfl: 0    aspirin 81 MG EC tablet, Take 1 tablet by mouth Daily., Disp:  , Rfl:     atorvastatin (LIPITOR) 20 MG tablet, Take 1 tablet by mouth Daily., Disp: 90 tablet, Rfl: 3    FLUoxetine (PROzac) 40 MG capsule, Take 1 capsule by mouth Daily., Disp: 30 capsule, Rfl: 3    furosemide (LASIX) 40 MG tablet, Take 1 tablet by mouth Daily As Needed (swelling, fluid retention)., Disp: 30 tablet, Rfl: 2    Hydrocod Omar-Chlorphe Omar ER (TUSSIONEX PENNKINETIC) 10-8 MG/5ML ER suspension, Take 5 mL by mouth Every 12 (Twelve) Hours As Needed for Cough., Disp: 100 mL, Rfl: 0    magnesium oxide (MAG-OX) 400 MG tablet, Take 1 tablet by mouth Daily., Disp: 30 tablet, Rfl: 5    Mavacamten (Camzyos) 2.5 MG capsule, Take 2.5 mg by mouth Daily., Disp: 30 capsule, Rfl: 1    metoprolol tartrate (LOPRESSOR) 50 MG tablet, Take 1 tablet by mouth 2 (Two) Times a Day., Disp: 180 tablet, Rfl: 3    ranolazine (Ranexa) 500 MG 12 hr tablet, Take 1 tablet by mouth 2 (Two) Times a Day., Disp: 180 tablet, Rfl: 3    spironolactone (ALDACTONE) 25 MG tablet, Take 1 tablet by mouth Daily., Disp: 90 tablet, Rfl: 3    vitamin D3 125 MCG (5000 UT) capsule capsule, Take 1 capsule by mouth Daily., Disp: , Rfl:          Drug Interactions  Camzyos and Fluoxetine-- starting dose lowered due to interaction and will monitor Echos    Adherence and Self-Administration  Adherence related to the patient's specialty therapy was discussed with the patient. The Adherence segment of this outreach has been reviewed and updated.   Methods for Supporting Patient Adherence and/or Self-Administration: None    Open Medication Therapy Problems  No medication therapy recommendations to display    Reassessment Plan & Follow-Up  1. Medication Therapy Changes: Camzyos 2.5mg by mouth every day  2. Related Plans, Therapy Recommendations, or Therapy Problems to Be Addressed: None      Attestation      I attest the patient was actively involved in and has agreed to the above plan of care. If the prescribed therapy is at any point deemed not appropriate  based on the current or future assessments, a consultation will be initiated with the patient's specialty care provider to determine the best course of action. The revised plan of therapy will be documented along with any required assessments and/or additional patient education provided.     Yvette Perez, Kim  Clinical Specialty Pharmacist, Cardiology  5/6/2025  13:27 EDT

## 2025-05-07 NOTE — TELEPHONE ENCOUNTER
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

## 2025-05-09 DIAGNOSIS — M85.89 OSTEOPENIA OF MULTIPLE SITES: ICD-10-CM

## 2025-05-09 DIAGNOSIS — I42.1 HOCM (HYPERTROPHIC OBSTRUCTIVE CARDIOMYOPATHY): Primary | ICD-10-CM

## 2025-05-09 RX ORDER — ALENDRONATE SODIUM 70 MG/1
70 TABLET ORAL
Qty: 12 TABLET | Refills: 0 | Status: SHIPPED | OUTPATIENT
Start: 2025-05-09

## 2025-05-19 ENCOUNTER — TELEPHONE (OUTPATIENT)
Dept: FAMILY MEDICINE CLINIC | Facility: CLINIC | Age: 70
End: 2025-05-19

## 2025-05-19 NOTE — TELEPHONE ENCOUNTER
Caller: Elysia Babcock    Relationship: Self    Best call back number: [phone]       What is the best time to reach you: ANYTIME     Who are you requesting to speak with (clinical staff, provider,  specific staff member): CLINICAL      What was the call regarding: PATIENT IS CALLING ABOUT A INTERACTIONS OF MEDICATION THAT SHE IS CURRENTLY TAKING.

## 2025-05-20 ENCOUNTER — TELEPHONE (OUTPATIENT)
Dept: CARDIOLOGY | Facility: CLINIC | Age: 70
End: 2025-05-20
Payer: MEDICARE

## 2025-05-20 NOTE — TELEPHONE ENCOUNTER
Pt called in with concerns after speaking to pharmacist, Pt is taking Camzyos 2.5mg daily and the Prozac 40mg daily. Didn't know if you need to lower dose of the prozac?

## 2025-05-20 NOTE — TELEPHONE ENCOUNTER
SW patient to check up on her after she started Camzyos. She has been taking it for a little over a week and is not having any side effects or issues so far. She said she knows that it wasn't going to be an immediate effect to feel better but she does notice that her symptoms are improving. She said that she can walk a further distance without getting out of breath or tired than she could before starting the medication. Her first echo is scheduled for 5/30 and I will follow up with her at that time.    Yvette Perez, Pharm.D.

## 2025-05-27 NOTE — TELEPHONE ENCOUNTER
Spoke with pt and let her know. Pt states she just doesn't want to be on anything right now. Has already been off the prozac for 3wks and will call if she feels the need to go back on something.

## 2025-05-29 ENCOUNTER — SPECIALTY PHARMACY (OUTPATIENT)
Facility: HOSPITAL | Age: 70
End: 2025-05-29
Payer: MEDICARE

## 2025-05-29 NOTE — PROGRESS NOTES
SW patient to remind her of her Echo that is scheduled tomorrow. Once Echo is read, I will follow up with Dr. Gan on the medication and [facility] After that has been submitted I will call patient back to let her know that she should be getting a new shipment of medication soon.     Yvette Perez, Pharm.D.

## 2025-05-30 ENCOUNTER — HOSPITAL ENCOUNTER (OUTPATIENT)
Facility: HOSPITAL | Age: 70
Discharge: HOME | End: 2025-05-30
Payer: MEDICARE

## 2025-05-30 DIAGNOSIS — I42.1 HOCM (HYPERTROPHIC OBSTRUCTIVE CARDIOMYOPATHY): ICD-10-CM

## 2025-05-30 PROCEDURE — 93325 DOPPLER ECHO COLOR FLOW MAPG: CPT

## 2025-05-30 PROCEDURE — 93308 TTE F-UP OR LMTD: CPT

## 2025-05-30 PROCEDURE — 93321 DOPPLER ECHO F-UP/LMTD STD: CPT

## 2025-06-02 ENCOUNTER — RESULTS FOLLOW-UP (OUTPATIENT)
Dept: CARDIOLOGY | Facility: CLINIC | Age: 70
End: 2025-06-02
Payer: MEDICARE

## 2025-06-02 LAB
BH CV ECHO MEAS - EDV(MOD-SP2): 64.8 ML
BH CV ECHO MEAS - EDV(MOD-SP4): 91.8 ML
BH CV ECHO MEAS - EF(MOD-SP2): 63.7 %
BH CV ECHO MEAS - EF(MOD-SP4): 65.7 %
BH CV ECHO MEAS - ESV(MOD-SP2): 23.5 ML
BH CV ECHO MEAS - ESV(MOD-SP4): 31.5 ML
BH CV ECHO MEAS - IVS/LVPW: 1 CM
BH CV ECHO MEAS - IVSD: 1.3 CM
BH CV ECHO MEAS - LV DIASTOLIC VOL/BSA (35-75): 48.5 CM2
BH CV ECHO MEAS - LV SYSTOLIC VOL/BSA (12-30): 16.7 CM2
BH CV ECHO MEAS - LVIDD: 3.8 CM
BH CV ECHO MEAS - LVIDS: 2.4 CM
BH CV ECHO MEAS - LVPWD: 1.3 CM
BH CV ECHO MEAS - SV(MOD-SP2): 41.3 ML
BH CV ECHO MEAS - SV(MOD-SP4): 60.3 ML
BH CV ECHO MEAS - SVI(MOD-SP2): 21.8 ML/M2
BH CV ECHO MEAS - SVI(MOD-SP4): 31.9 ML/M2

## 2025-06-03 ENCOUNTER — SPECIALTY PHARMACY (OUTPATIENT)
Dept: CARDIOLOGY | Facility: CLINIC | Age: 70
End: 2025-06-03
Payer: MEDICARE

## 2025-06-03 NOTE — PROGRESS NOTES
New rx for Camzyos 5mg was [facility] to [facility]. [facility]ed to [facility] and they are reaching out to patient to initiate shipment and should be shipped out in the next couple of days. Patient is aware that dose will increase to 5mg and to finish her 2.5mg before starting the 5mg. New Echo will be ordered for 4 weeks per protocol.    Yvette Perez, Pharm.D.

## 2025-06-04 ENCOUNTER — TELEPHONE (OUTPATIENT)
Dept: CARDIOLOGY | Facility: CLINIC | Age: 70
End: 2025-06-04
Payer: MEDICARE

## 2025-06-05 DIAGNOSIS — I42.1 HOCM (HYPERTROPHIC OBSTRUCTIVE CARDIOMYOPATHY): Primary | ICD-10-CM

## 2025-06-05 NOTE — TELEPHONE ENCOUNTER
Spoke with Theresa pharmacist at [facility] and she is going to process prescription as written for 5mg once daily.    Yvette Perez, Pharm.D.

## 2025-06-12 ENCOUNTER — TELEPHONE (OUTPATIENT)
Dept: CARDIOLOGY | Facility: CLINIC | Age: 70
End: 2025-06-12
Payer: MEDICARE

## 2025-06-12 NOTE — TELEPHONE ENCOUNTER
Tried to call patient to let them know that she needs to talk to scheduling about getting her Echo scheduled. They have been trying to call her. Could not leave a voicemail.    Yvette Perez, Pharm.D.

## 2025-06-13 ENCOUNTER — TELEPHONE (OUTPATIENT)
Dept: CARDIOLOGY | Facility: CLINIC | Age: 70
End: 2025-06-13
Payer: MEDICARE

## 2025-06-13 NOTE — TELEPHONE ENCOUNTER
Tried to call to remind that she has to get her Echo scheduled to be able to continue her Camzyos. Using a screening service and could not leave voicemail.    Yvette Perez, Pharm.D.

## 2025-06-16 ENCOUNTER — SPECIALTY PHARMACY (OUTPATIENT)
Dept: CARDIOLOGY | Facility: CLINIC | Age: 70
End: 2025-06-16
Payer: MEDICARE

## 2025-06-16 NOTE — PROGRESS NOTES
Patient called scheduling to get Echo scheduled for Camzyos protocol. Echo is scheduled for 7/1/2025.    Yvette Perez, Pharm.D.

## 2025-06-30 ENCOUNTER — TELEPHONE (OUTPATIENT)
Dept: CARDIOLOGY | Facility: CLINIC | Age: 70
End: 2025-06-30
Payer: MEDICARE

## 2025-06-30 NOTE — TELEPHONE ENCOUNTER
Tried to call patient to remind of Echo tomorrow. Using call screening service and did not .    Yvette Perez, Pharm.D.

## 2025-07-01 ENCOUNTER — HOSPITAL ENCOUNTER (OUTPATIENT)
Facility: HOSPITAL | Age: 70
Discharge: HOME | End: 2025-07-01
Admitting: INTERNAL MEDICINE
Payer: MEDICARE

## 2025-07-01 ENCOUNTER — SPECIALTY PHARMACY (OUTPATIENT)
Dept: CARDIOLOGY | Facility: CLINIC | Age: 70
End: 2025-07-01
Payer: MEDICARE

## 2025-07-01 DIAGNOSIS — I42.1 HOCM (HYPERTROPHIC OBSTRUCTIVE CARDIOMYOPATHY): ICD-10-CM

## 2025-07-01 LAB
BH CV ECHO LEFT VENTRICLE BASAL CAVITARY GRADIENT: 114 MMHG
BH CV ECHO MEAS - EDV(CUBED): 64 ML
BH CV ECHO MEAS - EDV(MOD-SP2): 83.3 ML
BH CV ECHO MEAS - EDV(MOD-SP4): 71.3 ML
BH CV ECHO MEAS - EF(MOD-SP2): 67.5 %
BH CV ECHO MEAS - EF(MOD-SP4): 65.6 %
BH CV ECHO MEAS - ESV(CUBED): 17.6 ML
BH CV ECHO MEAS - ESV(MOD-SP2): 27.1 ML
BH CV ECHO MEAS - ESV(MOD-SP4): 24.5 ML
BH CV ECHO MEAS - FS: 35 %
BH CV ECHO MEAS - IVS/LVPW: 1.18 CM
BH CV ECHO MEAS - IVSD: 1.3 CM
BH CV ECHO MEAS - LV DIASTOLIC VOL/BSA (35-75): 33.5 CM2
BH CV ECHO MEAS - LV MASS(C)D: 165.5 GRAMS
BH CV ECHO MEAS - LV SYSTOLIC VOL/BSA (12-30): 11.5 CM2
BH CV ECHO MEAS - LVIDD: 4 CM
BH CV ECHO MEAS - LVIDS: 2.6 CM
BH CV ECHO MEAS - LVPWD: 1.1 CM
BH CV ECHO MEAS - SV(MOD-SP2): 56.2 ML
BH CV ECHO MEAS - SV(MOD-SP4): 46.8 ML
BH CV ECHO MEAS - SVI(MOD-SP2): 26.4 ML/M2
BH CV ECHO MEAS - SVI(MOD-SP4): 22 ML/M2
LV EF BIPLANE MOD: 65 %

## 2025-07-01 PROCEDURE — 93308 TTE F-UP OR LMTD: CPT

## 2025-07-01 PROCEDURE — 93325 DOPPLER ECHO COLOR FLOW MAPG: CPT

## 2025-07-01 PROCEDURE — 93321 DOPPLER ECHO F-UP/LMTD STD: CPT

## 2025-07-02 ENCOUNTER — SPECIALTY PHARMACY (OUTPATIENT)
Dept: CARDIOLOGY | Facility: CLINIC | Age: 70
End: 2025-07-02
Payer: MEDICARE

## 2025-07-02 NOTE — PROGRESS NOTES
Specialty Pharmacy Patient Management Program  One-Time Clinical Outreach     Elysia Babcock is a 70 y.o. female seen by a Cardiology provider for Cardiomyopathy and enrolled in the Cardiology Patient Management program offered by [facility].      Echo showed no decrease in LVEF and Valsalva LVOT > 20. Continue same dose of Camzyos. Another Echo to be performed in 4 weeks before going to maintenance phase of treatment.    Yvette Perez PharmD  Clinical Specialty Pharmacist, Cardiology  7/2/2025  13:32 EDT

## 2025-07-07 ENCOUNTER — RESULTS FOLLOW-UP (OUTPATIENT)
Dept: CARDIOLOGY | Facility: CLINIC | Age: 70
End: 2025-07-07
Payer: MEDICARE

## 2025-07-07 NOTE — TELEPHONE ENCOUNTER
SW patient regarding results and recommendations. Voiced understanding. Per patient Yvette has spoke with patient regarding Camzyos.

## 2025-07-08 ENCOUNTER — SPECIALTY PHARMACY (OUTPATIENT)
Dept: CARDIOLOGY | Facility: CLINIC | Age: 70
End: 2025-07-08
Payer: MEDICARE

## 2025-07-08 DIAGNOSIS — I42.1 HOCM (HYPERTROPHIC OBSTRUCTIVE CARDIOMYOPATHY): Primary | ICD-10-CM

## 2025-07-09 ENCOUNTER — OFFICE VISIT (OUTPATIENT)
Dept: CARDIOLOGY | Facility: CLINIC | Age: 70
End: 2025-07-09
Payer: MEDICARE

## 2025-07-09 VITALS
BODY MASS INDEX: 37.45 KG/M2 | DIASTOLIC BLOOD PRESSURE: 82 MMHG | HEART RATE: 59 BPM | HEIGHT: 66 IN | WEIGHT: 233 LBS | SYSTOLIC BLOOD PRESSURE: 136 MMHG

## 2025-07-09 DIAGNOSIS — I10 PRIMARY HYPERTENSION: ICD-10-CM

## 2025-07-09 DIAGNOSIS — I25.10 CORONARY ARTERY DISEASE INVOLVING NATIVE CORONARY ARTERY OF NATIVE HEART WITHOUT ANGINA PECTORIS: ICD-10-CM

## 2025-07-09 DIAGNOSIS — E78.2 MIXED HYPERLIPIDEMIA: ICD-10-CM

## 2025-07-09 DIAGNOSIS — I42.1 HOCM (HYPERTROPHIC OBSTRUCTIVE CARDIOMYOPATHY): Primary | ICD-10-CM

## 2025-07-09 PROCEDURE — 99214 OFFICE O/P EST MOD 30 MIN: CPT | Performed by: INTERNAL MEDICINE

## 2025-07-09 PROCEDURE — 1160F RVW MEDS BY RX/DR IN RCRD: CPT | Performed by: INTERNAL MEDICINE

## 2025-07-09 PROCEDURE — 1159F MED LIST DOCD IN RCRD: CPT | Performed by: INTERNAL MEDICINE

## 2025-07-09 PROCEDURE — 3075F SYST BP GE 130 - 139MM HG: CPT | Performed by: INTERNAL MEDICINE

## 2025-07-09 PROCEDURE — 3078F DIAST BP <80 MM HG: CPT | Performed by: INTERNAL MEDICINE

## 2025-07-09 NOTE — PROGRESS NOTES
Chief Complaint  3 month follow up , Coronary Artery Disease, Cardiomyopathy, Hyperlipidemia, Hypertension, and bilateral leg edema     Subjective            Elysia Babcock presents to [facility] CARDIOLOGY      Ms. Babcock is here for follow-up evaluation management of coronary artery disease with previous PCI, hypertrophic obstructive cardiomyopathy diagnosed definitively recently on cardiac catheterization and echo, mixed hyperlipidemia and primary hypertension.  Since last visit she started on Camzyos 2.5 mg and recently titrated up to 5 mg.  Shortness of breath has improved subjectively.  She had a recent echo that still showed a LVOT gradient of 118 mmHg.  She has not had significant side effects from the medication.    PMH  Past Medical History:   Diagnosis Date    Allergic     Arthritis     Asthma     Bleeds easily     CHF (congestive heart failure)     Congenital heart disease     Coronary artery disease     Depression     Heart murmur     Hyperlipidemia     Hypertension     Obesity     Pleural effusion     Pneumonia 2023    Pulmonary arterial hypertension 2024    Rheumatoid arthritis     Visual impairment          SURGICALHX  Past Surgical History:   Procedure Laterality Date    CARDIAC CATHETERIZATION N/A 2025    Procedure: Left Heart Cath with Cors and SVG's;  Surgeon: GENESIS Gan MD;  Location: [facility] CATH INVASIVE LOCATION;  Service: Cardiovascular;  Laterality: N/A;    CATARACT EXTRACTION Bilateral      SECTION      CHOLECYSTECTOMY      COLONOSCOPY      COLONOSCOPY N/A 10/01/2024    Procedure: COLONOSCOPY;  Surgeon: Michael Mcknight MD;  Location: [facility] ENDOSCOPY;  Service: General;  Laterality: N/A;  diverticulosis    CORONARY STENT PLACEMENT      HIP BIPOLAR REPLACEMENT      JOINT REPLACEMENT      TONSILLECTOMY          SOC  Social History     Socioeconomic History    Marital status:    Tobacco Use    Smoking status: Never      Passive exposure: Never    Smokeless tobacco: Never    Tobacco comments:     No second hand smoke exposure    Vaping Use    Vaping status: Never Used   Substance and Sexual Activity    Alcohol use: Yes     Alcohol/week: 6.0 standard drinks of alcohol     Types: 3 Glasses of wine, 3 Drinks containing 0.5 oz of alcohol per week     Comment: I enjoy happy hour.    Drug use: Never    Sexual activity: Not Currently     Partners: Male     Birth control/protection: None     Comment: I have been off the pill for 25 years.         FAMHX  Family History   Problem Relation Age of Onset    Asthma Mother     Depression Mother     Hyperlipidemia Mother     Heart disease Father     Heart failure Father           ALLERGY  No Known Allergies     MEDSCURRENT    Current Outpatient Medications:     acetaminophen (TYLENOL) 500 MG tablet, Take 1 tablet by mouth Every 6 (Six) Hours As Needed for Mild Pain., Disp: , Rfl:     albuterol sulfate  (90 Base) MCG/ACT inhaler, Inhale 2 puffs Every 4 (Four) Hours As Needed for Wheezing., Disp: 18 g, Rfl: 0    alendronate (FOSAMAX) 70 MG tablet, Take 1 tablet by mouth Every 7 (Seven) Days., Disp: 12 tablet, Rfl: 0    aspirin 81 MG EC tablet, Take 1 tablet by mouth Daily., Disp: , Rfl:     atorvastatin (LIPITOR) 20 MG tablet, Take 1 tablet by mouth Daily., Disp: 90 tablet, Rfl: 3    furosemide (LASIX) 40 MG tablet, Take 1 tablet by mouth Daily As Needed (swelling, fluid retention)., Disp: 30 tablet, Rfl: 2    magnesium oxide (MAG-OX) 400 MG tablet, Take 1 tablet by mouth Daily., Disp: 30 tablet, Rfl: 5    Mavacamten (Camzyos) 5 MG capsule, Take 1 capsule by mouth Daily., Disp: 30 capsule, Rfl: 1    metoprolol tartrate (LOPRESSOR) 50 MG tablet, Take 1 tablet by mouth 2 (Two) Times a Day., Disp: 180 tablet, Rfl: 3    ranolazine (Ranexa) 500 MG 12 hr tablet, Take 1 tablet by mouth 2 (Two) Times a Day., Disp: 180 tablet, Rfl: 3    spironolactone (ALDACTONE) 25 MG tablet, Take 1 tablet by mouth  "Daily., Disp: 90 tablet, Rfl: 3    vitamin D3 125 MCG (5000 UT) capsule capsule, Take 1 capsule by mouth Daily., Disp: , Rfl:     FLUoxetine (PROzac) 40 MG capsule, Take 1 capsule by mouth Daily., Disp: 30 capsule, Rfl: 3    Hydrocod Moar-Chlorphe Omar ER (TUSSIONEX PENNKINETIC) 10-8 MG/5ML ER suspension, Take 5 mL by mouth Every 12 (Twelve) Hours As Needed for Cough., Disp: 100 mL, Rfl: 0      Review of Systems   Constitutional: Positive for malaise/fatigue.   Cardiovascular:  Positive for dyspnea on exertion.   Respiratory:  Positive for shortness of breath.         Objective     /82   Pulse 59   Ht 167.6 cm (65.98\")   Wt 106 kg (233 lb)   BMI 37.63 kg/m²       General Appearance:   well developed  well nourished  HENT:   oropharynx moist  lips not cyanotic  Neck:  thyroid not enlarged  supple  Respiratory:  no respiratory distress  normal breath sounds  no rales  Cardiovascular:  no jugular venous distention  regular rhythm  apical impulse normal  S1 normal, S2 normal  no S3, no S4   Grade 2 basal systolic murmur  no rub, no thrill  carotid pulses normal; no bruit  pedal pulses normal  lower extremity edema: none    Musculoskeletal:  no clubbing of fingers.   normocephalic, head atraumatic  Skin:   warm, dry  Psychiatric:  judgement and insight appropriate  normal mood and affect      Result Review :     The following data was reviewed by: Armin Gan MD on 04/02/2025:    CMP          11/20/2024    14:57 2/27/2025    07:59   CMP   Glucose 98  122    BUN 29  14    Creatinine 0.97  1.08    EGFR 63.0  55.4    Sodium 136  136    Potassium 4.3  3.6    Chloride 102  101    Calcium 9.7  8.8    Total Protein 7.0     Albumin 4.1     Globulin 2.9     Total Bilirubin 0.6     Alkaline Phosphatase 65     AST (SGOT) 19     ALT (SGPT) 17     Albumin/Globulin Ratio 1.4     BUN/Creatinine Ratio 29.9  13.0    Anion Gap 9.4  13.8      CBC          11/20/2024    14:57 2/27/2025    07:59   CBC   WBC 6.31  " 9.21    RBC 4.08  4.08    Hemoglobin 13.4  12.9    Hematocrit 40.6  39.5    MCV 99.5  96.8    MCH 32.8  31.6    MCHC 33.0  32.7    RDW 13.6  13.7    Platelets 163  210      Lipid Panel          11/20/2024    14:57   Lipid Panel   Total Cholesterol 256    Triglycerides 106    HDL Cholesterol 58    VLDL Cholesterol 19    LDL Cholesterol  179    LDL/HDL Ratio 3.05      TSH          11/20/2024    14:57   TSH   TSH 2.480        Data reviewed : Cardiac catheterization and echo data reviewed     Procedures                Assessment and Plan        ASSESSMENT:  Encounter Diagnoses   Name Primary?    HOCM (hypertrophic obstructive cardiomyopathy) Yes    Coronary artery disease involving native coronary artery of native heart without angina pectoris     Primary hypertension     Mixed hyperlipidemia          PLAN:    1.  Coronary artery disease, native vessel without angina-recent catheterization showed patent LAD stent with jailed diagonal.  Continue secondary prevention medical therapy  2.  Hypertrophic obstructive cardiomyopathy-she is tolerating Camzyos at the 5 mg dose.  Ejection fraction remains preserved.  LVOT gradient still significantly elevated.  Repeat echo around July 30 pending.  3.  Mixed hyperlipidemia-stable, continue statin therapy  4.  Primary hypertension-stable, continue current medical therapy    Repeat echo at the end of July, clinical follow-up in approximately 3 months        Patient was given instructions and counseling regarding her condition or for health maintenance advice. Please see specific information pulled into the AVS if appropriate.             GENESIS Gan MD  7/9/2025    09:26 EDT

## 2025-07-16 ENCOUNTER — TELEPHONE (OUTPATIENT)
Dept: CARDIOLOGY | Facility: CLINIC | Age: 70
End: 2025-07-16
Payer: MEDICARE

## 2025-07-16 NOTE — TELEPHONE ENCOUNTER
Tried to call patient to discuss her Echo appointment and when we had to change it to due to REMS program for Camzyos. Her original Echo was scheduled after the cut off date for paperwork submission to continue Camzyos and Sharmin scheduled her into the only available appointment slot in [facility]. If she can not make the time for the rescheduled appointment, Sharmin said we could look at the [place] schedule because it is easier to find an opening if the patient is willing to go to [place]. Left my direct number for call back.    Yvette Perez, Pharm.D.

## 2025-07-17 ENCOUNTER — TELEPHONE (OUTPATIENT)
Dept: CARDIOLOGY | Facility: CLINIC | Age: 70
End: 2025-07-17
Payer: MEDICARE

## 2025-07-17 NOTE — TELEPHONE ENCOUNTER
Tried to call patient again to discuss Echo appointment change. Left my direct number for call back.    Yvette Perez, Pharm.D.

## 2025-07-21 ENCOUNTER — SPECIALTY PHARMACY (OUTPATIENT)
Facility: HOSPITAL | Age: 70
End: 2025-07-21
Payer: MEDICARE

## 2025-07-25 RX ORDER — MAVACAMTEN 5 MG/1
1 CAPSULE, GELATIN COATED ORAL DAILY
Qty: 30 CAPSULE | Refills: 1 | OUTPATIENT
Start: 2025-07-25

## 2025-07-29 ENCOUNTER — SPECIALTY PHARMACY (OUTPATIENT)
Dept: CARDIOLOGY | Facility: CLINIC | Age: 70
End: 2025-07-29
Payer: MEDICARE

## 2025-07-30 ENCOUNTER — HOSPITAL ENCOUNTER (OUTPATIENT)
Facility: HOSPITAL | Age: 70
Discharge: HOME | End: 2025-07-30
Admitting: INTERNAL MEDICINE
Payer: MEDICARE

## 2025-07-30 DIAGNOSIS — I42.1 HOCM (HYPERTROPHIC OBSTRUCTIVE CARDIOMYOPATHY): ICD-10-CM

## 2025-07-30 PROCEDURE — 93321 DOPPLER ECHO F-UP/LMTD STD: CPT

## 2025-07-30 PROCEDURE — 93308 TTE F-UP OR LMTD: CPT

## 2025-07-30 PROCEDURE — 93325 DOPPLER ECHO COLOR FLOW MAPG: CPT

## 2025-07-31 ENCOUNTER — RESULTS FOLLOW-UP (OUTPATIENT)
Dept: CARDIOLOGY | Facility: CLINIC | Age: 70
End: 2025-07-31
Payer: MEDICARE

## 2025-07-31 ENCOUNTER — SPECIALTY PHARMACY (OUTPATIENT)
Dept: CARDIOLOGY | Facility: CLINIC | Age: 70
End: 2025-07-31
Payer: MEDICARE

## 2025-07-31 LAB
AV VMAX SYS DOP: 154.6 CM/SEC
BH CV ECHO MEAS - AO MAX PG: 9.6 MMHG
BH CV ECHO MEAS - EDV(MOD-SP2): 71.4 ML
BH CV ECHO MEAS - EDV(MOD-SP4): 98.3 ML
BH CV ECHO MEAS - EF(MOD-SP2): 58.8 %
BH CV ECHO MEAS - EF(MOD-SP4): 59.6 %
BH CV ECHO MEAS - ESV(MOD-SP2): 29.4 ML
BH CV ECHO MEAS - ESV(MOD-SP4): 39.7 ML
BH CV ECHO MEAS - IVSD: 1.2 CM
BH CV ECHO MEAS - LV DIASTOLIC VOL/BSA (35-75): 46.6 CM2
BH CV ECHO MEAS - LV MAX PG: 5.6 MMHG
BH CV ECHO MEAS - LV SYSTOLIC VOL/BSA (12-30): 18.8 CM2
BH CV ECHO MEAS - LV V1 MAX: 117.8 CM/SEC
BH CV ECHO MEAS - LVOT AREA: 3.2 CM2
BH CV ECHO MEAS - LVOT DIAM: 2.02 CM
BH CV ECHO MEAS - SV(MOD-SP2): 42 ML
BH CV ECHO MEAS - SV(MOD-SP4): 58.6 ML
BH CV ECHO MEAS - SVI(MOD-SP2): 19.9 ML/M2
BH CV ECHO MEAS - SVI(MOD-SP4): 27.8 ML/M2
LV EF BIPLANE MOD: 60.4 %

## 2025-07-31 RX ORDER — MAVACAMTEN 10 MG/1
10 CAPSULE, GELATIN COATED ORAL DAILY
Qty: 30 CAPSULE | Refills: 5 | Status: SHIPPED | OUTPATIENT
Start: 2025-07-31

## 2025-08-06 ENCOUNTER — SPECIALTY PHARMACY (OUTPATIENT)
Dept: CARDIOLOGY | Facility: CLINIC | Age: 70
End: 2025-08-06
Payer: MEDICARE

## 2025-08-06 DIAGNOSIS — I42.1 HOCM (HYPERTROPHIC OBSTRUCTIVE CARDIOMYOPATHY): Primary | ICD-10-CM

## 2025-08-08 RX ORDER — FUROSEMIDE 40 MG/1
40 TABLET ORAL DAILY PRN
Qty: 90 TABLET | Refills: 1 | Status: SHIPPED | OUTPATIENT
Start: 2025-08-08

## 2025-08-13 ENCOUNTER — SPECIALTY PHARMACY (OUTPATIENT)
Dept: CARDIOLOGY | Facility: CLINIC | Age: 70
End: 2025-08-13
Payer: MEDICARE

## 2025-08-14 DIAGNOSIS — M85.89 OSTEOPENIA OF MULTIPLE SITES: ICD-10-CM

## 2025-08-14 RX ORDER — ALENDRONATE SODIUM 70 MG/1
70 TABLET ORAL
Qty: 12 TABLET | Refills: 0 | Status: SHIPPED | OUTPATIENT
Start: 2025-08-14

## 2025-08-15 RX ORDER — SPIRONOLACTONE 25 MG/1
25 TABLET ORAL DAILY
Qty: 90 TABLET | Refills: 3 | Status: SHIPPED | OUTPATIENT
Start: 2025-08-15

## 2025-08-26 ENCOUNTER — SPECIALTY PHARMACY (OUTPATIENT)
Facility: HOSPITAL | Age: 70
End: 2025-08-26
Payer: MEDICARE

## 2025-08-27 ENCOUNTER — HOSPITAL ENCOUNTER (OUTPATIENT)
Facility: HOSPITAL | Age: 70
Discharge: HOME | End: 2025-08-27
Admitting: INTERNAL MEDICINE
Payer: MEDICARE

## 2025-08-27 DIAGNOSIS — I42.1 HOCM (HYPERTROPHIC OBSTRUCTIVE CARDIOMYOPATHY): ICD-10-CM

## 2025-08-27 LAB
BH CV ECHO MEAS - AO ROOT DIAM: 2.7 CM
BH CV ECHO MEAS - EDV(CUBED): 177.1 ML
BH CV ECHO MEAS - EDV(MOD-SP2): 109 ML
BH CV ECHO MEAS - EDV(MOD-SP4): 75.4 ML
BH CV ECHO MEAS - EF(MOD-SP2): 56.9 %
BH CV ECHO MEAS - EF(MOD-SP4): 64.5 %
BH CV ECHO MEAS - ESV(CUBED): 53.3 ML
BH CV ECHO MEAS - ESV(MOD-SP2): 47 ML
BH CV ECHO MEAS - ESV(MOD-SP4): 26.8 ML
BH CV ECHO MEAS - FS: 33 %
BH CV ECHO MEAS - IVS/LVPW: 1 CM
BH CV ECHO MEAS - IVSD: 0.99 CM
BH CV ECHO MEAS - LA DIMENSION: 3.6 CM
BH CV ECHO MEAS - LV DIASTOLIC VOL/BSA (35-75): 37 CM2
BH CV ECHO MEAS - LV MASS(C)D: 217.6 GRAMS
BH CV ECHO MEAS - LV SYSTOLIC VOL/BSA (12-30): 13.1 CM2
BH CV ECHO MEAS - LVIDD: 5.6 CM
BH CV ECHO MEAS - LVIDS: 3.8 CM
BH CV ECHO MEAS - LVOT AREA: 3.1 CM2
BH CV ECHO MEAS - LVOT DIAM: 1.98 CM
BH CV ECHO MEAS - LVPWD: 0.99 CM
BH CV ECHO MEAS - MR MAX PG: 82.2 MMHG
BH CV ECHO MEAS - MR MAX VEL: 453.4 CM/SEC
BH CV ECHO MEAS - RVDD: 3.4 CM
BH CV ECHO MEAS - SV(MOD-SP2): 62 ML
BH CV ECHO MEAS - SV(MOD-SP4): 48.6 ML
BH CV ECHO MEAS - SVI(MOD-SP2): 30.4 ML/M2
BH CV ECHO MEAS - SVI(MOD-SP4): 23.8 ML/M2
LV EF BIPLANE MOD: 61.8 %

## 2025-08-27 PROCEDURE — 93308 TTE F-UP OR LMTD: CPT

## (undated) DEVICE — TR BAND RADIAL ARTERY COMPRESSION DEVICE: Brand: TR BAND

## (undated) DEVICE — Device

## (undated) DEVICE — RADIFOCUS GLIDEWIRE: Brand: GLIDEWIRE

## (undated) DEVICE — RADIFOCUS OPTITORQUE ANGIOGRAPHIC CATHETER: Brand: OPTITORQUE

## (undated) DEVICE — SOL IRRG H2O PL/BG 1000ML STRL

## (undated) DEVICE — Device: Brand: DEFENDO AIR/WATER/SUCTION AND BIOPSY VALVE

## (undated) DEVICE — GLIDESHEATH SLENDER ACCESS KIT: Brand: GLIDESHEATH SLENDER

## (undated) DEVICE — LINER SURG CANSTR SXN S/RIGD 1500CC

## (undated) DEVICE — CONN JET HYDRA H20 AUXILIARY DISP

## (undated) DEVICE — TUBING, SUCTION, 1/4" X 10', STRAIGHT: Brand: MEDLINE

## (undated) DEVICE — SOL IRR H2O BTL 1000ML STRL

## (undated) DEVICE — CATH F5 INF JR 4 100CM: Brand: INFINITI

## (undated) DEVICE — CATH F5INF TLPIGST145 110CM6SH: Brand: INFINITI

## (undated) DEVICE — STERILE POLYISOPRENE POWDER-FREE SURGICAL GLOVES WITH EMOLLIENT COATING: Brand: PROTEXIS

## (undated) DEVICE — STERILE POLYISOPRENE POWDER-FREE SURGICAL GLOVES: Brand: PROTEXIS

## (undated) DEVICE — SOLIDIFIER LIQLOC PLS 1500CC BT